# Patient Record
Sex: FEMALE | Race: WHITE | NOT HISPANIC OR LATINO | Employment: PART TIME | URBAN - METROPOLITAN AREA
[De-identification: names, ages, dates, MRNs, and addresses within clinical notes are randomized per-mention and may not be internally consistent; named-entity substitution may affect disease eponyms.]

---
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
## 2019-01-14 ENCOUNTER — TRANSFERRED RECORDS (OUTPATIENT)
Dept: HEALTH INFORMATION MANAGEMENT | Facility: CLINIC | Age: 19
End: 2019-01-14

## 2019-01-15 ENCOUNTER — OFFICE VISIT (OUTPATIENT)
Dept: PEDIATRICS | Facility: OTHER | Age: 19
End: 2019-01-15
Payer: COMMERCIAL

## 2019-01-15 VITALS
SYSTOLIC BLOOD PRESSURE: 100 MMHG | WEIGHT: 121.75 LBS | BODY MASS INDEX: 21.57 KG/M2 | RESPIRATION RATE: 16 BRPM | HEART RATE: 74 BPM | TEMPERATURE: 98.1 F | DIASTOLIC BLOOD PRESSURE: 64 MMHG | HEIGHT: 63 IN

## 2019-01-15 DIAGNOSIS — F41.1 GAD (GENERALIZED ANXIETY DISORDER): ICD-10-CM

## 2019-01-15 DIAGNOSIS — T74.21XA SEXUAL ASSAULT OF ADULT, INITIAL ENCOUNTER: Primary | ICD-10-CM

## 2019-01-15 LAB
ALBUMIN UR-MCNC: 30 MG/DL
APPEARANCE UR: CLEAR
BACTERIA #/AREA URNS HPF: ABNORMAL /HPF
BETA HCG QUAL IFA URINE: NEGATIVE
BILIRUB UR QL STRIP: NEGATIVE
COLOR UR AUTO: YELLOW
GLUCOSE UR STRIP-MCNC: NEGATIVE MG/DL
HGB UR QL STRIP: ABNORMAL
KETONES UR STRIP-MCNC: NEGATIVE MG/DL
LEUKOCYTE ESTERASE UR QL STRIP: NEGATIVE
MUCOUS THREADS #/AREA URNS LPF: PRESENT /LPF
NITRATE UR QL: NEGATIVE
NON-SQ EPI CELLS #/AREA URNS LPF: ABNORMAL /LPF
PH UR STRIP: 5.5 PH (ref 5–7)
RBC #/AREA URNS AUTO: ABNORMAL /HPF
SOURCE: ABNORMAL
SP GR UR STRIP: 1.02 (ref 1–1.03)
SPECIMEN SOURCE: NORMAL
UROBILINOGEN UR STRIP-ACNC: 0.2 EU/DL (ref 0.2–1)
WBC #/AREA URNS AUTO: ABNORMAL /HPF
WET PREP SPEC: NORMAL

## 2019-01-15 PROCEDURE — 86780 TREPONEMA PALLIDUM: CPT | Performed by: NURSE PRACTITIONER

## 2019-01-15 PROCEDURE — 84703 CHORIONIC GONADOTROPIN ASSAY: CPT | Performed by: NURSE PRACTITIONER

## 2019-01-15 PROCEDURE — 36415 COLL VENOUS BLD VENIPUNCTURE: CPT | Performed by: NURSE PRACTITIONER

## 2019-01-15 PROCEDURE — 87389 HIV-1 AG W/HIV-1&-2 AB AG IA: CPT | Performed by: NURSE PRACTITIONER

## 2019-01-15 PROCEDURE — 87591 N.GONORRHOEAE DNA AMP PROB: CPT | Performed by: NURSE PRACTITIONER

## 2019-01-15 PROCEDURE — 81001 URINALYSIS AUTO W/SCOPE: CPT | Performed by: NURSE PRACTITIONER

## 2019-01-15 PROCEDURE — 87210 SMEAR WET MOUNT SALINE/INK: CPT | Performed by: NURSE PRACTITIONER

## 2019-01-15 PROCEDURE — 87491 CHLMYD TRACH DNA AMP PROBE: CPT | Performed by: NURSE PRACTITIONER

## 2019-01-15 PROCEDURE — 99214 OFFICE O/P EST MOD 30 MIN: CPT | Performed by: NURSE PRACTITIONER

## 2019-01-15 RX ORDER — AZITHROMYCIN 500 MG/1
1000 TABLET, FILM COATED ORAL DAILY
Qty: 2 TABLET | Refills: 0 | Status: SHIPPED | OUTPATIENT
Start: 2019-01-15 | End: 2019-02-12

## 2019-01-15 RX ORDER — METRONIDAZOLE 500 MG/1
TABLET ORAL
Qty: 4 TABLET | Refills: 0 | Status: SHIPPED | OUTPATIENT
Start: 2019-01-15 | End: 2019-02-12

## 2019-01-15 ASSESSMENT — MIFFLIN-ST. JEOR: SCORE: 1302.5
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
## 2019-01-15 NOTE — PATIENT INSTRUCTIONS
Labs as ordered. Will call with results.  Medications as ordered.   Recheck with me in one month.

## 2019-01-16 ENCOUNTER — TELEPHONE (OUTPATIENT)
Dept: PEDIATRICS | Facility: OTHER | Age: 19
End: 2019-01-16

## 2019-01-16 LAB
C TRACH DNA SPEC QL NAA+PROBE: NEGATIVE
HIV 1+2 AB+HIV1 P24 AG SERPL QL IA: NONREACTIVE
N GONORRHOEA DNA SPEC QL NAA+PROBE: NEGATIVE
SPECIMEN SOURCE: NORMAL
SPECIMEN SOURCE: NORMAL
T PALLIDUM AB SER QL: NONREACTIVE

## 2019-01-16 NOTE — TELEPHONE ENCOUNTER
Left message for patient to return our phone call. See message below.  Claudette Tijerina CMA    Written by Alessandra Menjivar APRN CNP on 1/16/2019  1:21 PM   Negative  urine, wet prep (yeast and bacterial culture), HIV and syphilis test were all negative. We are just waiting on her gonorrhea and chlamydia testing.

## 2019-01-17 ENCOUNTER — TELEPHONE (OUTPATIENT)
Dept: PEDIATRICS | Facility: OTHER | Age: 19
End: 2019-01-17

## 2019-01-17 NOTE — TELEPHONE ENCOUNTER
Please let patient know that her gonorrhea and chlamydia were both negative.     Alessandra Menjivar, Pediatric Nurse Practitioner   [place]

## 2019-01-17 NOTE — TELEPHONE ENCOUNTER
Left a message for patient to let her know both her tests results came back negative.  Navya Zafar, CMA

## 2019-01-29 ENCOUNTER — MYC MEDICAL ADVICE (OUTPATIENT)
Dept: PEDIATRICS | Facility: OTHER | Age: 19
End: 2019-01-29

## 2019-01-29 NOTE — LETTER
1/29/2019        RE: Cristel Drakechristianoamadou  [address] [facility] [address]        To whom it may concern:     Cristel is under my care and treatment for Generalized Anxiety Disorder.       Sincerely,        ASHELY Vang CNP

## 2019-02-09 ENCOUNTER — MYC MEDICAL ADVICE (OUTPATIENT)
Dept: PEDIATRICS | Facility: OTHER | Age: 19
End: 2019-02-09

## 2019-02-11 NOTE — TELEPHONE ENCOUNTER
Resent letter via email, sent [employer] letting patient know. Asking to let us know when she receives it.  Emani Grider MA

## 2019-02-12 ENCOUNTER — OFFICE VISIT (OUTPATIENT)
Dept: PEDIATRICS | Facility: OTHER | Age: 19
End: 2019-02-12
Payer: COMMERCIAL

## 2019-02-12 VITALS
HEIGHT: 63 IN | RESPIRATION RATE: 16 BRPM | TEMPERATURE: 98.5 F | HEART RATE: 80 BPM | BODY MASS INDEX: 21.97 KG/M2 | WEIGHT: 124 LBS | DIASTOLIC BLOOD PRESSURE: 62 MMHG | SYSTOLIC BLOOD PRESSURE: 110 MMHG

## 2019-02-12 DIAGNOSIS — Z23 NEED FOR VACCINATION: ICD-10-CM

## 2019-02-12 DIAGNOSIS — F41.1 GAD (GENERALIZED ANXIETY DISORDER): Primary | ICD-10-CM

## 2019-02-12 PROCEDURE — 90471 IMMUNIZATION ADMIN: CPT | Performed by: NURSE PRACTITIONER

## 2019-02-12 PROCEDURE — 90472 IMMUNIZATION ADMIN EACH ADD: CPT | Performed by: NURSE PRACTITIONER

## 2019-02-12 PROCEDURE — 99214 OFFICE O/P EST MOD 30 MIN: CPT | Mod: 25 | Performed by: NURSE PRACTITIONER

## 2019-02-12 PROCEDURE — 90632 HEPA VACCINE ADULT IM: CPT | Performed by: NURSE PRACTITIONER

## 2019-02-12 PROCEDURE — 90651 9VHPV VACCINE 2/3 DOSE IM: CPT | Performed by: NURSE PRACTITIONER

## 2019-02-12 RX ORDER — PAROXETINE 10 MG/1
TABLET, FILM COATED ORAL
Qty: 67 TABLET | Refills: 0 | Status: SHIPPED | OUTPATIENT
Start: 2019-02-19 | End: 2019-03-07

## 2019-02-12 ASSESSMENT — PATIENT HEALTH QUESTIONNAIRE - PHQ9
SUM OF ALL RESPONSES TO PHQ QUESTIONS 1-9: 18
5. POOR APPETITE OR OVEREATING: NEARLY EVERY DAY

## 2019-02-12 ASSESSMENT — ANXIETY QUESTIONNAIRES
5. BEING SO RESTLESS THAT IT IS HARD TO SIT STILL: MORE THAN HALF THE DAYS
7. FEELING AFRAID AS IF SOMETHING AWFUL MIGHT HAPPEN: NEARLY EVERY DAY
3. WORRYING TOO MUCH ABOUT DIFFERENT THINGS: NEARLY EVERY DAY
GAD7 TOTAL SCORE: 19
2. NOT BEING ABLE TO STOP OR CONTROL WORRYING: NEARLY EVERY DAY
6. BECOMING EASILY ANNOYED OR IRRITABLE: MORE THAN HALF THE DAYS
IF YOU CHECKED OFF ANY PROBLEMS ON THIS QUESTIONNAIRE, HOW DIFFICULT HAVE THESE PROBLEMS MADE IT FOR YOU TO DO YOUR WORK, TAKE CARE OF THINGS AT HOME, OR GET ALONG WITH OTHER PEOPLE: VERY DIFFICULT
1. FEELING NERVOUS, ANXIOUS, OR ON EDGE: NEARLY EVERY DAY

## 2019-02-12 ASSESSMENT — MIFFLIN-ST. JEOR: SCORE: 1312.71

## 2019-02-12 NOTE — NURSING NOTE
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
## 2019-02-13 ASSESSMENT — ANXIETY QUESTIONNAIRES: GAD7 TOTAL SCORE: 19

## 2019-03-07 ENCOUNTER — OFFICE VISIT (OUTPATIENT)
Dept: PEDIATRICS | Facility: OTHER | Age: 19
End: 2019-03-07
Payer: COMMERCIAL

## 2019-03-07 VITALS
WEIGHT: 122 LBS | BODY MASS INDEX: 21.62 KG/M2 | RESPIRATION RATE: 14 BRPM | HEART RATE: 94 BPM | DIASTOLIC BLOOD PRESSURE: 50 MMHG | SYSTOLIC BLOOD PRESSURE: 102 MMHG | HEIGHT: 63 IN | TEMPERATURE: 98.5 F

## 2019-03-07 DIAGNOSIS — F41.1 GAD (GENERALIZED ANXIETY DISORDER): ICD-10-CM

## 2019-03-07 PROCEDURE — 99213 OFFICE O/P EST LOW 20 MIN: CPT | Performed by: NURSE PRACTITIONER

## 2019-03-07 RX ORDER — PAROXETINE 10 MG/1
20 TABLET, FILM COATED ORAL DAILY
Qty: 180 TABLET | Refills: 0 | Status: SHIPPED | OUTPATIENT
Start: 2019-03-07 | End: 2019-04-04

## 2019-03-07 ASSESSMENT — ANXIETY QUESTIONNAIRES
1. FEELING NERVOUS, ANXIOUS, OR ON EDGE: SEVERAL DAYS
6. BECOMING EASILY ANNOYED OR IRRITABLE: SEVERAL DAYS
IF YOU CHECKED OFF ANY PROBLEMS ON THIS QUESTIONNAIRE, HOW DIFFICULT HAVE THESE PROBLEMS MADE IT FOR YOU TO DO YOUR WORK, TAKE CARE OF THINGS AT HOME, OR GET ALONG WITH OTHER PEOPLE: SOMEWHAT DIFFICULT
GAD7 TOTAL SCORE: 4
5. BEING SO RESTLESS THAT IT IS HARD TO SIT STILL: SEVERAL DAYS
7. FEELING AFRAID AS IF SOMETHING AWFUL MIGHT HAPPEN: SEVERAL DAYS
2. NOT BEING ABLE TO STOP OR CONTROL WORRYING: NOT AT ALL
3. WORRYING TOO MUCH ABOUT DIFFERENT THINGS: NOT AT ALL

## 2019-03-07 ASSESSMENT — MIFFLIN-ST. JEOR: SCORE: 1297.39

## 2019-03-07 ASSESSMENT — PATIENT HEALTH QUESTIONNAIRE - PHQ9
5. POOR APPETITE OR OVEREATING: NOT AT ALL
SUM OF ALL RESPONSES TO PHQ QUESTIONS 1-9: 9

## 2019-03-07 NOTE — PROGRESS NOTES
SUBJECTIVE:                                                    Cristel Grissom is a 18 year old female who presents to clinic today with mother because of:    Chief Complaint   Patient presents with     Recheck Medication     anxiety, insomina     Panel Management     PHQ/SUSU        HPI:    Started Paxil one month ago, started at 10 mg now up 20 mg.   Increased sleeping the first week, now having difficulty sleeping, feels tired but cannot fall asleep. Hours of insomnia. Taking medicine at night.     Not working, not applying job. No exercising.   No nausea, stomach aches.     No suicidal ideation. No self harm.       SUSU-7 SCORE 5/29/2018 2/12/2019 3/7/2019   Total Score 7 (mild anxiety) - -   Total Score 7 19 4       PHQ-9 SCORE 5/29/2018 2/12/2019 3/7/2019   PHQ-9 Total Score MyChart 2 (Minimal depression) - -   PHQ-9 Total Score 2 18 9         ROS:  Constitutional, eye, ENT, skin, respiratory, cardiac, and GI are normal except as otherwise noted.    PROBLEM LIST:  Patient Active Problem List    Diagnosis Date Noted     Acne vulgaris 01/25/2017     Priority: Medium     SUSU (generalized anxiety disorder) 01/25/2017     Priority: Medium      MEDICATIONS:  Current Outpatient Medications   Medication Sig Dispense Refill     norgestimate-ethinyl estradiol (ORTHO-CYCLEN, SPRINTEC) 0.25-35 MG-MCG per tablet Take 1 tablet by mouth daily 84 tablet 3     PARoxetine (PAXIL) 10 MG tablet Take 1 tablet (10 mg) by mouth daily for 7 days, THEN 2 tablets (20 mg) daily. 67 tablet 0     hydrOXYzine (ATARAX) 10 MG/5ML syrup Take 12.5 mLs (25 mg) by mouth 3 times daily May increase to 25 mls (50 mg) (Patient not taking: Reported on 3/7/2019) 473 mL 0      ALLERGIES:  Allergies   Allergen Reactions     Bactrim [Sulfamethoxazole W/Trimethoprim]      When very young, vomiting likely per mom     Cefzil [Cefprozil]      When very young, vomiting likely per mom     Penicillins      When very young, vomiting likely per mom     Sulfa Drugs  "     When very young, vomiting likely per mom       Problem list and histories reviewed & adjusted, as indicated.    OBJECTIVE:                                                      /50   Pulse 94   Temp 98.5  F (36.9  C) (Temporal)   Resp 14   Ht 5' 2.99\" (1.6 m)   Wt 122 lb (55.3 kg)   LMP 02/19/2019   BMI 21.62 kg/m     Blood pressure percentiles are not available for patients who are 18 years or older.     PSYCH: mentation appears normal and affect normal/bright  MENTAL STATUS EXAM:  Appearance/Behavior: Neatly groomed  Speech: Normal  Mood/Affect: normal affect  Insight: Adequate    DIAGNOSTICS: None    ASSESSMENT/PLAN:                                                    1. SUSU (generalized anxiety disorder)  One month follow up for change in medication.   Side effects of difficulty falling asleep.   She is not very active during the day. She is not working, exercising.   Discussed sleep hygiene, staying out of her bed unless sleeping.   Take medicine in the morning instead of evening.       FOLLOW UP: one month    Alessandra Menjivar, Pediatric Nurse Practitioner   [facility]    "

## 2019-03-08 ASSESSMENT — ANXIETY QUESTIONNAIRES: GAD7 TOTAL SCORE: 4

## 2019-03-08 NOTE — PATIENT INSTRUCTIONS
Take medication in the morning, usually with breakfast.   Practice improved sleep hygiene at night.   Try the hydroxyzine in the evening.

## 2019-04-04 ENCOUNTER — OFFICE VISIT (OUTPATIENT)
Dept: PEDIATRICS | Facility: OTHER | Age: 19
End: 2019-04-04
Payer: COMMERCIAL

## 2019-04-04 VITALS
BODY MASS INDEX: 21.93 KG/M2 | WEIGHT: 123.75 LBS | HEIGHT: 63 IN | TEMPERATURE: 98.1 F | HEART RATE: 115 BPM | OXYGEN SATURATION: 99 % | RESPIRATION RATE: 18 BRPM

## 2019-04-04 DIAGNOSIS — F41.1 GAD (GENERALIZED ANXIETY DISORDER): Primary | ICD-10-CM

## 2019-04-04 PROCEDURE — 99213 OFFICE O/P EST LOW 20 MIN: CPT | Performed by: NURSE PRACTITIONER

## 2019-04-04 RX ORDER — PAROXETINE 10 MG/1
30 TABLET, FILM COATED ORAL EVERY MORNING
Qty: 45 TABLET | Refills: 0 | Status: SHIPPED | OUTPATIENT
Start: 2019-04-04 | End: 2019-07-21

## 2019-04-04 ASSESSMENT — ANXIETY QUESTIONNAIRES
GAD7 TOTAL SCORE: 4
7. FEELING AFRAID AS IF SOMETHING AWFUL MIGHT HAPPEN: NOT AT ALL
6. BECOMING EASILY ANNOYED OR IRRITABLE: SEVERAL DAYS
GAD7 TOTAL SCORE: 4
3. WORRYING TOO MUCH ABOUT DIFFERENT THINGS: SEVERAL DAYS
4. TROUBLE RELAXING: NOT AT ALL
5. BEING SO RESTLESS THAT IT IS HARD TO SIT STILL: NOT AT ALL
1. FEELING NERVOUS, ANXIOUS, OR ON EDGE: SEVERAL DAYS
7. FEELING AFRAID AS IF SOMETHING AWFUL MIGHT HAPPEN: NOT AT ALL
GAD7 TOTAL SCORE: 4
2. NOT BEING ABLE TO STOP OR CONTROL WORRYING: SEVERAL DAYS

## 2019-04-04 ASSESSMENT — MIFFLIN-ST. JEOR: SCORE: 1305.95

## 2019-04-04 ASSESSMENT — PATIENT HEALTH QUESTIONNAIRE - PHQ9
SUM OF ALL RESPONSES TO PHQ QUESTIONS 1-9: 8
SUM OF ALL RESPONSES TO PHQ QUESTIONS 1-9: 8
10. IF YOU CHECKED OFF ANY PROBLEMS, HOW DIFFICULT HAVE THESE PROBLEMS MADE IT FOR YOU TO DO YOUR WORK, TAKE CARE OF THINGS AT HOME, OR GET ALONG WITH OTHER PEOPLE: VERY DIFFICULT
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
## 2019-04-05 ASSESSMENT — ANXIETY QUESTIONNAIRES: GAD7 TOTAL SCORE: 4

## 2019-04-05 ASSESSMENT — PATIENT HEALTH QUESTIONNAIRE - PHQ9: SUM OF ALL RESPONSES TO PHQ QUESTIONS 1-9: 8

## 2019-07-15 ENCOUNTER — OFFICE VISIT (OUTPATIENT)
Dept: FAMILY MEDICINE | Facility: OTHER | Age: 19
End: 2019-07-15

## 2019-07-15 VITALS
BODY MASS INDEX: 23.42 KG/M2 | WEIGHT: 132.2 LBS | RESPIRATION RATE: 12 BRPM | TEMPERATURE: 97.6 F | DIASTOLIC BLOOD PRESSURE: 70 MMHG | HEART RATE: 66 BPM | SYSTOLIC BLOOD PRESSURE: 102 MMHG | HEIGHT: 63 IN

## 2019-07-15 DIAGNOSIS — S06.0X0A CONCUSSION WITHOUT LOSS OF CONSCIOUSNESS, INITIAL ENCOUNTER: Primary | ICD-10-CM

## 2019-07-15 PROCEDURE — 99213 OFFICE O/P EST LOW 20 MIN: CPT | Performed by: STUDENT IN AN ORGANIZED HEALTH CARE EDUCATION/TRAINING PROGRAM

## 2019-07-15 ASSESSMENT — PAIN SCALES - GENERAL: PAINLEVEL: MODERATE PAIN (5)

## 2019-07-15 ASSESSMENT — MIFFLIN-ST. JEOR: SCORE: 1339.28
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
## 2019-07-15 NOTE — LETTER
[facility]  [address] [address]  Phone: [phone]    July 15, 2019        Cristel Grissom  [address] [facility] [address]          To whom it may concern:    RE: Cristel Grissom    Patient was seen and treated today at our clinic today for concussion. I am okay with her returning to work today. I recommend she does not work Thursdays for the next 2 weeks since they are field trip days and require more cognitive effort.  If her headache or other symptoms of concussion worsen, she may need to either work part-time for a period of time or stop working for a period of time.     Please contact me for questions or concerns.      Sincerely,            ASHELY Collins CNP

## 2019-07-21 ENCOUNTER — MYC REFILL (OUTPATIENT)
Dept: PEDIATRICS | Facility: OTHER | Age: 19
End: 2019-07-21

## 2019-07-21 DIAGNOSIS — F41.1 GAD (GENERALIZED ANXIETY DISORDER): ICD-10-CM

## 2019-07-23 ENCOUNTER — MYC REFILL (OUTPATIENT)
Dept: PEDIATRICS | Facility: OTHER | Age: 19
End: 2019-07-23

## 2019-07-23 DIAGNOSIS — F41.1 GAD (GENERALIZED ANXIETY DISORDER): ICD-10-CM

## 2019-07-23 RX ORDER — PAROXETINE 10 MG/1
30 TABLET, FILM COATED ORAL EVERY MORNING
Qty: 45 TABLET | Refills: 0 | Status: CANCELLED | OUTPATIENT
Start: 2019-07-23

## 2019-07-23 RX ORDER — PAROXETINE 10 MG/1
30 TABLET, FILM COATED ORAL EVERY MORNING
Qty: 90 TABLET | Refills: 0 | Status: SHIPPED | OUTPATIENT
Start: 2019-07-23 | End: 2019-07-24

## 2019-07-23 NOTE — TELEPHONE ENCOUNTER
Will approve 1 month.  Cristel is due for med check before any additional refills.  Please schedule.  Electronically signed by Evelyn Workman M.D.

## 2019-07-23 NOTE — TELEPHONE ENCOUNTER
Left message that refill was approved for 1 month and to call and schedule prior to running out for med check appt.

## 2019-07-24 ENCOUNTER — OFFICE VISIT (OUTPATIENT)
Dept: FAMILY MEDICINE | Facility: OTHER | Age: 19
End: 2019-07-24
Payer: COMMERCIAL

## 2019-07-24 VITALS
RESPIRATION RATE: 18 BRPM | DIASTOLIC BLOOD PRESSURE: 60 MMHG | SYSTOLIC BLOOD PRESSURE: 106 MMHG | HEART RATE: 80 BPM | BODY MASS INDEX: 24.31 KG/M2 | TEMPERATURE: 97.4 F | WEIGHT: 137.2 LBS | HEIGHT: 63 IN

## 2019-07-24 DIAGNOSIS — F41.1 GAD (GENERALIZED ANXIETY DISORDER): ICD-10-CM

## 2019-07-24 PROCEDURE — 99213 OFFICE O/P EST LOW 20 MIN: CPT | Performed by: STUDENT IN AN ORGANIZED HEALTH CARE EDUCATION/TRAINING PROGRAM

## 2019-07-24 RX ORDER — PAROXETINE 10 MG/1
30 TABLET, FILM COATED ORAL EVERY MORNING
Qty: 90 TABLET | Refills: 1 | Status: SHIPPED | OUTPATIENT
Start: 2019-07-24 | End: 2019-08-21

## 2019-07-24 RX ORDER — HYDROXYZINE HCL 10 MG/5 ML
25 SOLUTION, ORAL ORAL 3 TIMES DAILY
Qty: 473 ML | Refills: 0 | Status: SHIPPED | OUTPATIENT
Start: 2019-07-24 | End: 2020-04-17

## 2019-07-24 ASSESSMENT — ANXIETY QUESTIONNAIRES
4. TROUBLE RELAXING: NOT AT ALL
2. NOT BEING ABLE TO STOP OR CONTROL WORRYING: SEVERAL DAYS
GAD7 TOTAL SCORE: 4
1. FEELING NERVOUS, ANXIOUS, OR ON EDGE: SEVERAL DAYS
GAD7 TOTAL SCORE: 4
6. BECOMING EASILY ANNOYED OR IRRITABLE: NOT AT ALL
7. FEELING AFRAID AS IF SOMETHING AWFUL MIGHT HAPPEN: SEVERAL DAYS
3. WORRYING TOO MUCH ABOUT DIFFERENT THINGS: SEVERAL DAYS
GAD7 TOTAL SCORE: 4
7. FEELING AFRAID AS IF SOMETHING AWFUL MIGHT HAPPEN: SEVERAL DAYS
5. BEING SO RESTLESS THAT IT IS HARD TO SIT STILL: NOT AT ALL

## 2019-07-24 ASSESSMENT — PATIENT HEALTH QUESTIONNAIRE - PHQ9
SUM OF ALL RESPONSES TO PHQ QUESTIONS 1-9: 4
SUM OF ALL RESPONSES TO PHQ QUESTIONS 1-9: 4

## 2019-07-24 ASSESSMENT — MIFFLIN-ST. JEOR: SCORE: 1361.96

## 2019-07-24 NOTE — PROGRESS NOTES
Subjective     Cristel Grissom is a 19 year old female who presents to clinic today for the following health issues:    History of Present Illness        Mental Health Follow-up:  Patient presents to follow-up on Anxiety.    Patient's anxiety since last visit has been:  Good  The patient is not having other symptoms associated with anxiety.  Any significant life events: relationship concerns  Patient is feeling anxious or having panic attacks.  Patient has no concerns about alcohol or drug use.     Social History  Tobacco Use    Smoking status: Never Smoker    Smokeless tobacco: Never Used  Alcohol use: No  Drug use: No      Today's PHQ-9         PHQ-9 Total Score:     (P) 4   PHQ-9 Q9 Thoughts of better off dead/self-harm past 2 weeks :   (P) Not at all   Thoughts of suicide or self harm:      Self-harm Plan:        Self-harm Action:          Safety concerns for self or others:           She eats 2-3 servings of fruits and vegetables daily.She consumes 4 sweetened beverage(s) daily.  She is taking medications regularly.    Answers for HPI/ROS submitted by the patient on 7/24/2019   Chronic problems general questions HPI Form  PHQ9 TOTAL SCORE: 4  SUSU 7 TOTAL SCORE: 4    Patient Active Problem List   Diagnosis     Acne vulgaris     SUSU (generalized anxiety disorder)     History reviewed. No pertinent surgical history.    Social History     Tobacco Use     Smoking status: Never Smoker     Smokeless tobacco: Never Used   Substance Use Topics     Alcohol use: No     History reviewed. No pertinent family history.      Current Outpatient Medications   Medication Sig Dispense Refill     hydrOXYzine (ATARAX) 10 MG/5ML syrup Take 12.5 mLs (25 mg) by mouth 3 times daily May increase to 25 mls (50 mg) 473 mL 0     PARoxetine (PAXIL) 10 MG tablet Take 3 tablets (30 mg) by mouth every morning 90 tablet 1     norgestimate-ethinyl estradiol (ORTHO-CYCLEN, SPRINTEC) 0.25-35 MG-MCG per tablet Take 1 tablet by mouth daily (Patient  "not taking: Reported on 7/15/2019) 84 tablet 3     Allergies   Allergen Reactions     Bactrim [Sulfamethoxazole W/Trimethoprim]      When very young, vomiting likely per mom     Cefzil [Cefprozil]      When very young, vomiting likely per mom     Penicillins      When very young, vomiting likely per mom     Sulfa Drugs      When very young, vomiting likely per mom     BP Readings from Last 3 Encounters:   07/24/19 106/60   07/15/19 102/70   03/07/19 102/50    Wt Readings from Last 3 Encounters:   07/24/19 62.2 kg (137 lb 3.2 oz)   07/15/19 60 kg (132 lb 3.2 oz)   04/04/19 56.1 kg (123 lb 12 oz) (41 %)*     * Growth percentiles are based on CDC (Girls, 2-20 Years) data.                    Reviewed and updated as needed this visit by Provider         Review of Systems   ROS COMP: Constitutional, HEENT, cardiovascular, pulmonary, gi and gu systems are negative, except as otherwise noted.      Objective    /60   Pulse 80   Temp 97.4  F (36.3  C) (Temporal)   Resp 18   Ht 1.601 m (5' 3.03\")   Wt 62.2 kg (137 lb 3.2 oz)   BMI 24.28 kg/m    Body mass index is 24.28 kg/m .  Physical Exam   GENERAL: healthy, alert and no distress  RESP: lungs clear to auscultation - no rales, rhonchi or wheezes  CV: regular rate and rhythm, normal S1 S2, no S3 or S4, no murmur, click or rub, no peripheral edema and peripheral pulses strong  PSYCH: mentation appears normal, mildly anxious, judgement and insight intact and appearance well groomed    Diagnostic Test Results:  Labs reviewed in Epic        Assessment & Plan     1. SUSU (generalized anxiety disorder)  Improved.  Continue current medication.  Follow-up in 6 months or sooner if needed.  - hydrOXYzine (ATARAX) 10 MG/5ML syrup; Take 12.5 mLs (25 mg) by mouth 3 times daily May increase to 25 mls (50 mg)  Dispense: 473 mL; Refill: 0  - PARoxetine (PAXIL) 10 MG tablet; Take 3 tablets (30 mg) by mouth every morning  Dispense: 90 tablet; Refill: 1     No follow-ups on " file.    ASHELY Collins [facility]

## 2019-07-25 ASSESSMENT — PATIENT HEALTH QUESTIONNAIRE - PHQ9: SUM OF ALL RESPONSES TO PHQ QUESTIONS 1-9: 4

## 2019-07-25 ASSESSMENT — ANXIETY QUESTIONNAIRES: GAD7 TOTAL SCORE: 4

## 2019-08-09 ENCOUNTER — MYC MEDICAL ADVICE (OUTPATIENT)
Dept: FAMILY MEDICINE | Facility: OTHER | Age: 19
End: 2019-08-09

## 2019-08-09 ENCOUNTER — OFFICE VISIT (OUTPATIENT)
Dept: FAMILY MEDICINE | Facility: OTHER | Age: 19
End: 2019-08-09
Payer: COMMERCIAL

## 2019-08-09 VITALS
TEMPERATURE: 97 F | BODY MASS INDEX: 24.36 KG/M2 | OXYGEN SATURATION: 100 % | DIASTOLIC BLOOD PRESSURE: 70 MMHG | SYSTOLIC BLOOD PRESSURE: 112 MMHG | RESPIRATION RATE: 16 BRPM | HEART RATE: 100 BPM | WEIGHT: 137.5 LBS | HEIGHT: 63 IN

## 2019-08-09 DIAGNOSIS — S05.91XA RIGHT EYE INJURY, INITIAL ENCOUNTER: Primary | ICD-10-CM

## 2019-08-09 PROCEDURE — 99213 OFFICE O/P EST LOW 20 MIN: CPT | Performed by: FAMILY MEDICINE

## 2019-08-09 ASSESSMENT — MIFFLIN-ST. JEOR: SCORE: 1363.3

## 2019-08-09 ASSESSMENT — PAIN SCALES - GENERAL: PAINLEVEL: NO PAIN (0)

## 2019-08-09 NOTE — LETTER
[address]   [facility] [address]  Phone: [phone]    August 12, 2019        Cristel Grissom  [address] [facility] [address]          To whom it may concern:    RE: Cristel Grissom    Patient was seen and treated at our clinic on 8/9/19 and can return to work with out any restrictions.    Please contact me for questions or concerns.      Sincerely,        Arlene Cummings MD

## 2019-08-09 NOTE — PROGRESS NOTES
Subjective     Cristel Grissom is a 19 year old female who presents to clinic today for the following health issues:    HPI     Eye(s) Problem  Onset: x 1 day    Description:   Location: right  Pain: YES- when touched  Redness: no    Accompanying Signs & Symptoms:  Discharge/mattering: no  Swelling: YES- Eye Lid  Visual changes: YES- Double Vision yesterday  Fever: no  Nasal Congestion: no  Bothered by bright lights: no    History:   Trauma: YES- hit with a gold ball  Foreign body exposure: no    Precipitating factors:   Wearing contacts: YES    Alleviating factors:  Improved by: NA  Therapies Tried and outcome: Ice      -------------------------------------    Patient Active Problem List   Diagnosis     Acne vulgaris     SUSU (generalized anxiety disorder)     Right eye injury, initial encounter     History reviewed. No pertinent surgical history.    Social History     Tobacco Use     Smoking status: Never Smoker     Smokeless tobacco: Never Used   Substance Use Topics     Alcohol use: No     History reviewed. No pertinent family history.      Current Outpatient Medications   Medication Sig Dispense Refill     hydrOXYzine (ATARAX) 10 MG/5ML syrup Take 12.5 mLs (25 mg) by mouth 3 times daily May increase to 25 mls (50 mg) 473 mL 0     PARoxetine (PAXIL) 10 MG tablet Take 3 tablets (30 mg) by mouth every morning 90 tablet 1     norgestimate-ethinyl estradiol (ORTHO-CYCLEN, SPRINTEC) 0.25-35 MG-MCG per tablet Take 1 tablet by mouth daily (Patient not taking: Reported on 7/15/2019) 84 tablet 3     Allergies   Allergen Reactions     Bactrim [Sulfamethoxazole W/Trimethoprim]      When very young, vomiting likely per mom     Cefzil [Cefprozil]      When very young, vomiting likely per mom     Penicillins      When very young, vomiting likely per mom     Sulfa Drugs      When very young, vomiting likely per mom     BP Readings from Last 3 Encounters:   08/09/19 112/70   07/24/19 106/60   07/15/19 102/70    Wt Readings from  "Last 3 Encounters:   08/09/19 62.4 kg (137 lb 8 oz)   07/24/19 62.2 kg (137 lb 3.2 oz)   07/15/19 60 kg (132 lb 3.2 oz)                    Reviewed and updated as needed this visit by Provider  Tobacco  Allergies  Meds  Problems  Med Hx  Surg Hx  Fam Hx         Review of Systems   ROS COMP: Constitutional, HEENT, cardiovascular, pulmonary, gi and gu systems are negative, except as otherwise noted.      Objective    /70 (BP Location: Left arm, Patient Position: Chair, Cuff Size: Adult Regular)   Pulse 100   Temp 97  F (36.1  C) (Temporal)   Resp 16   Ht 1.601 m (5' 3.03\")   Wt 62.4 kg (137 lb 8 oz)   SpO2 100%   BMI 24.33 kg/m    Body mass index is 24.33 kg/m .  Physical Exam   Constitutional: She appears well-developed and well-nourished.   HENT:   Head: Normocephalic.   Eyes: Pupils are equal, round, and reactive to light. Conjunctivae and EOM are normal. Right eye exhibits no discharge. Left eye exhibits no discharge. No scleral icterus.          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 Normal extraocular muscle movements, normal pupillary reflexes. Normal conjunctiva. Has a mild swelling along the uperior aspect of the orbital bone. No open wounds. Advised nsaids and ice . Reassured  Discussed home care  Reportable signs and symptoms discussed  RTC if symptoms persist or fail to improve                      See Patient Instructions  No follow-ups on file.    Arlene Cummings MD  [facility]    "

## 2019-08-09 NOTE — ASSESSMENT & PLAN NOTE
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 Normal extraocular muscle movements, normal pupillary reflexes. Normal conjunctiva. Has a mild swelling along the uperior aspect of the orbital bone. No open wounds. Advised nsaids and ice . Reassured  Discussed home care  Reportable signs and symptoms discussed  RTC if symptoms persist or fail to improve

## 2019-08-19 NOTE — PROGRESS NOTES
Subjective     Cristel Grissom is a 19 year old female who presents to clinic today for the following health issues:    Anxiety   This is a chronic problem. The problem occurs intermittently. The problem has been gradually improving. The treatment provided significant relief.      She is doing well with Paxil at its current dose.  She is using hydroxyzine rarely for panic symptoms.  She is going off to college soon and needs her refills sent to [facility]. She would also like to discuss pulling her hair.  She picks at her hair and pulls it out and to use on it and will ingest it.  She has been doing this for many years and it is a nervous habit.  She is talked to counselors about it in the past and they have recommended replacing the habit with something different such as washing her hands.  The habit will dissipate for periods of time but then worsen at times.      Patient Active Problem List   Diagnosis     Acne vulgaris     SUSU (generalized anxiety disorder)     Right eye injury, initial encounter     History reviewed. No pertinent surgical history.    Social History     Tobacco Use     Smoking status: Never Smoker     Smokeless tobacco: Never Used   Substance Use Topics     Alcohol use: No     History reviewed. No pertinent family history.      Current Outpatient Medications   Medication Sig Dispense Refill     hydrOXYzine (ATARAX) 10 MG/5ML syrup Take 12.5 mLs (25 mg) by mouth 3 times daily May increase to 25 mls (50 mg) 473 mL 0     PARoxetine (PAXIL) 10 MG tablet Take 3 tablets (30 mg) by mouth every morning 270 tablet 3     norgestimate-ethinyl estradiol (ORTHO-CYCLEN, SPRINTEC) 0.25-35 MG-MCG per tablet Take 1 tablet by mouth daily (Patient not taking: Reported on 8/20/2019) 84 tablet 3     Allergies   Allergen Reactions     Bactrim [Sulfamethoxazole W/Trimethoprim]      When very young, vomiting likely per mom     Cefzil [Cefprozil]      When very young, vomiting likely per mom     Penicillins       When very young, vomiting likely per mom     Sulfa Drugs      When very young, vomiting likely per mom     BP Readings from Last 3 Encounters:   08/20/19 92/60   08/09/19 112/70   07/24/19 106/60    Wt Readings from Last 3 Encounters:   08/20/19 63 kg (139 lb)   08/09/19 62.4 kg (137 lb 8 oz)   07/24/19 62.2 kg (137 lb 3.2 oz)                    Reviewed and updated as needed this visit by Provider         Review of Systems   Psychiatric/Behavioral: The patient is nervous/anxious.       ROS COMP: Constitutional, HEENT, cardiovascular, pulmonary, gi and gu systems are negative, except as otherwise noted.      Objective    BP 92/60   Pulse 84   Temp 98.2  F (36.8  C) (Temporal)   Resp 16   Wt 63 kg (139 lb)   LMP 08/08/2019 (Exact Date)   BMI 24.60 kg/m    Body mass index is 24.6 kg/m .  Physical Exam   GENERAL: healthy, alert and no distress  RESP: lungs clear to auscultation - no rales, rhonchi or wheezes  CV: regular rate and rhythm, normal S1 S2, no S3 or S4, no murmur, click or rub  PSYCH: mentation appears normal, anxious, judgement and insight intact and appearance well groomed    Diagnostic Test Results:  Labs reviewed in Epic        Assessment & Plan     1. SUSU (generalized anxiety disorder)  2. Hair plucking  Stable.  Has had an increase in picking at hair.  We discussed trying to find a replacement habit that is something that she can easily do wherever she is there is using some sort of fidget device that she keeps in her pocket or carry something she can doodle on. Continue current medication(s) and dose(s).   - PARoxetine (PAXIL) 10 MG tablet; Take 3 tablets (30 mg) by mouth every morning  Dispense: 270 tablet; Refill: 3      No follow-ups on file.    ASHELY Collins [facility]

## 2019-08-20 ENCOUNTER — OFFICE VISIT (OUTPATIENT)
Dept: FAMILY MEDICINE | Facility: OTHER | Age: 19
End: 2019-08-20
Payer: COMMERCIAL

## 2019-08-20 VITALS
RESPIRATION RATE: 16 BRPM | HEART RATE: 84 BPM | DIASTOLIC BLOOD PRESSURE: 60 MMHG | BODY MASS INDEX: 24.6 KG/M2 | TEMPERATURE: 98.2 F | SYSTOLIC BLOOD PRESSURE: 92 MMHG | WEIGHT: 139 LBS

## 2019-08-20 DIAGNOSIS — F63.3: ICD-10-CM

## 2019-08-20 DIAGNOSIS — F41.1 GAD (GENERALIZED ANXIETY DISORDER): Primary | ICD-10-CM

## 2019-08-20 PROCEDURE — 99213 OFFICE O/P EST LOW 20 MIN: CPT | Performed by: STUDENT IN AN ORGANIZED HEALTH CARE EDUCATION/TRAINING PROGRAM

## 2019-08-20 ASSESSMENT — ANXIETY QUESTIONNAIRES
2. NOT BEING ABLE TO STOP OR CONTROL WORRYING: SEVERAL DAYS
7. FEELING AFRAID AS IF SOMETHING AWFUL MIGHT HAPPEN: SEVERAL DAYS
3. WORRYING TOO MUCH ABOUT DIFFERENT THINGS: SEVERAL DAYS
GAD7 TOTAL SCORE: 5
6. BECOMING EASILY ANNOYED OR IRRITABLE: SEVERAL DAYS
IF YOU CHECKED OFF ANY PROBLEMS ON THIS QUESTIONNAIRE, HOW DIFFICULT HAVE THESE PROBLEMS MADE IT FOR YOU TO DO YOUR WORK, TAKE CARE OF THINGS AT HOME, OR GET ALONG WITH OTHER PEOPLE: NOT DIFFICULT AT ALL
5. BEING SO RESTLESS THAT IT IS HARD TO SIT STILL: NOT AT ALL
1. FEELING NERVOUS, ANXIOUS, OR ON EDGE: SEVERAL DAYS

## 2019-08-20 ASSESSMENT — ENCOUNTER SYMPTOMS: NERVOUS/ANXIOUS: 1

## 2019-08-20 ASSESSMENT — PAIN SCALES - GENERAL: PAINLEVEL: NO PAIN (0)

## 2019-08-20 ASSESSMENT — PATIENT HEALTH QUESTIONNAIRE - PHQ9
SUM OF ALL RESPONSES TO PHQ QUESTIONS 1-9: 2
5. POOR APPETITE OR OVEREATING: NOT AT ALL

## 2019-08-21 RX ORDER — PAROXETINE 10 MG/1
30 TABLET, FILM COATED ORAL EVERY MORNING
Qty: 270 TABLET | Refills: 3 | Status: SHIPPED | OUTPATIENT
Start: 2019-08-21 | End: 2020-04-17

## 2019-08-21 ASSESSMENT — ANXIETY QUESTIONNAIRES: GAD7 TOTAL SCORE: 5

## 2019-08-29 ENCOUNTER — TELEPHONE (OUTPATIENT)
Dept: FAMILY MEDICINE | Facility: OTHER | Age: 19
End: 2019-08-29

## 2019-08-29 NOTE — TELEPHONE ENCOUNTER
Reason for Call:  Form, our goal is to have forms completed with 72 hours, however, some forms may require a visit or additional information.    Type of letter, form or note:  worker's compensation    Who is the form from?: Insurance comp    Where did the form come from: form was faxed in    What clinic location was the form placed at?: [facility] - [phone]    Where the form was placed: Given to MA/RN    What number is listed as a contact on the form?: [phone]       Additional comments: none    Call taken on 8/29/2019 at 6:22 PM by Shania Gray

## 2019-10-09 ENCOUNTER — TELEPHONE (OUTPATIENT)
Dept: FAMILY MEDICINE | Facility: OTHER | Age: 19
End: 2019-10-09

## 2019-10-09 NOTE — TELEPHONE ENCOUNTER
Reason for Call:  Form, our goal is to have forms completed with 72 hours, however, some forms may require a visit or additional information.    Type of letter, form or note:  worker's compensation    Who is the form from?: [employer] (if other please explain)    Where did the form come from: form was mailed in    What clinic location was the form placed at?: [facility] - [phone]    Where the form was placed: box Box/Folder    What number is listed as a contact on the form?: [phone]       Additional comments: n/a    Call taken on 10/9/2019 at 1:21 PM by Fabienne Conde

## 2020-03-10 ENCOUNTER — HEALTH MAINTENANCE LETTER (OUTPATIENT)
Age: 20
End: 2020-03-10

## 2020-03-15 ENCOUNTER — MYC MEDICAL ADVICE (OUTPATIENT)
Dept: PEDIATRICS | Facility: OTHER | Age: 20
End: 2020-03-15

## 2020-03-16 NOTE — TELEPHONE ENCOUNTER
Patient would like to schedule an appt for a med check. Left message to call clinic back. When patient calls back please schedule a telephone visit for a med check.     Zheng Avilez MA on 3/16/2020 at 8:16 AM

## 2020-03-17 NOTE — TELEPHONE ENCOUNTER
Attempted to reach the patient with the following information.  Left message for patient to return call to clinic.     Rosa Taylor MA

## 2020-03-18 NOTE — TELEPHONE ENCOUNTER
Patient's mother informed, she will chat with patient when she gets home and submit an evisit with Pamella Robles.  Emani Grider MA

## 2020-04-16 ENCOUNTER — E-VISIT (OUTPATIENT)
Dept: FAMILY MEDICINE | Facility: OTHER | Age: 20
End: 2020-04-16
Payer: COMMERCIAL

## 2020-04-16 DIAGNOSIS — F41.1 GAD (GENERALIZED ANXIETY DISORDER): ICD-10-CM

## 2020-04-16 PROCEDURE — 99421 OL DIG E/M SVC 5-10 MIN: CPT | Performed by: STUDENT IN AN ORGANIZED HEALTH CARE EDUCATION/TRAINING PROGRAM

## 2020-04-16 ASSESSMENT — ANXIETY QUESTIONNAIRES
3. WORRYING TOO MUCH ABOUT DIFFERENT THINGS: SEVERAL DAYS
5. BEING SO RESTLESS THAT IT IS HARD TO SIT STILL: MORE THAN HALF THE DAYS
1. FEELING NERVOUS, ANXIOUS, OR ON EDGE: MORE THAN HALF THE DAYS
GAD7 TOTAL SCORE: 12
2. NOT BEING ABLE TO STOP OR CONTROL WORRYING: MORE THAN HALF THE DAYS
4. TROUBLE RELAXING: MORE THAN HALF THE DAYS
6. BECOMING EASILY ANNOYED OR IRRITABLE: MORE THAN HALF THE DAYS
7. FEELING AFRAID AS IF SOMETHING AWFUL MIGHT HAPPEN: SEVERAL DAYS
GAD7 TOTAL SCORE: 12
7. FEELING AFRAID AS IF SOMETHING AWFUL MIGHT HAPPEN: SEVERAL DAYS

## 2020-04-17 RX ORDER — PAROXETINE 10 MG/1
30 TABLET, FILM COATED ORAL EVERY MORNING
Qty: 270 TABLET | Refills: 3 | Status: SHIPPED | OUTPATIENT
Start: 2020-04-17 | End: 2020-07-15

## 2020-04-17 RX ORDER — HYDROXYZINE HCL 10 MG/5 ML
25 SOLUTION, ORAL ORAL 3 TIMES DAILY PRN
Qty: 473 ML | Refills: 0 | Status: SHIPPED | OUTPATIENT
Start: 2020-04-17 | End: 2020-08-24

## 2020-04-17 ASSESSMENT — ANXIETY QUESTIONNAIRES: GAD7 TOTAL SCORE: 12

## 2020-04-19 ENCOUNTER — MYC MEDICAL ADVICE (OUTPATIENT)
Dept: FAMILY MEDICINE | Facility: OTHER | Age: 20
End: 2020-04-19

## 2020-07-10 ENCOUNTER — TELEPHONE (OUTPATIENT)
Dept: FAMILY MEDICINE | Facility: OTHER | Age: 20
End: 2020-07-10

## 2020-07-10 NOTE — TELEPHONE ENCOUNTER
Reason for call:  Patient reporting a symptom    Symptom or request: light headed dizzy nausea    Duration (how long have symptoms been present): per discoapi    Have you been treated for this before? No    Additional comments: please call to triage. Scheduled with EVGENY today. Per discoapi    Phone Number patient can be reached at:  Home number on file [phone] (home)    Best Time:  any    Can we leave a detailed message on this number:  YES    Call taken on 7/10/2020 at 8:54 AM by Aline Bloom

## 2020-07-13 NOTE — TELEPHONE ENCOUNTER
"Appt was canceled by pt using her mychart with the comment \"feeling better\"    Sunitha Perez, MSN, RN    "

## 2020-07-15 ENCOUNTER — OFFICE VISIT (OUTPATIENT)
Dept: FAMILY MEDICINE | Facility: OTHER | Age: 20
End: 2020-07-15
Payer: COMMERCIAL

## 2020-07-15 VITALS
WEIGHT: 128 LBS | DIASTOLIC BLOOD PRESSURE: 76 MMHG | BODY MASS INDEX: 23.55 KG/M2 | SYSTOLIC BLOOD PRESSURE: 122 MMHG | TEMPERATURE: 97.7 F | HEIGHT: 62 IN | RESPIRATION RATE: 16 BRPM | HEART RATE: 96 BPM

## 2020-07-15 DIAGNOSIS — Z78.9 CONSUMES A VEGAN DIET: ICD-10-CM

## 2020-07-15 DIAGNOSIS — F41.1 GAD (GENERALIZED ANXIETY DISORDER): ICD-10-CM

## 2020-07-15 DIAGNOSIS — R53.81 MALAISE AND FATIGUE: Primary | ICD-10-CM

## 2020-07-15 DIAGNOSIS — K30 STOMACH UPSET: ICD-10-CM

## 2020-07-15 DIAGNOSIS — R07.89 CHEST DISCOMFORT: ICD-10-CM

## 2020-07-15 DIAGNOSIS — R53.83 MALAISE AND FATIGUE: Primary | ICD-10-CM

## 2020-07-15 DIAGNOSIS — R42 DIZZINESS: ICD-10-CM

## 2020-07-15 LAB
ALBUMIN SERPL-MCNC: 4.5 G/DL (ref 3.4–5)
ALP SERPL-CCNC: 72 U/L (ref 40–150)
ALT SERPL W P-5'-P-CCNC: 13 U/L (ref 0–50)
ANION GAP SERPL CALCULATED.3IONS-SCNC: 7 MMOL/L (ref 3–14)
AST SERPL W P-5'-P-CCNC: 8 U/L (ref 0–45)
BASOPHILS # BLD AUTO: 0 10E9/L (ref 0–0.2)
BASOPHILS NFR BLD AUTO: 0.3 %
BILIRUB SERPL-MCNC: 0.4 MG/DL (ref 0.2–1.3)
BUN SERPL-MCNC: 10 MG/DL (ref 7–30)
CALCIUM SERPL-MCNC: 9.4 MG/DL (ref 8.5–10.1)
CHLORIDE SERPL-SCNC: 105 MMOL/L (ref 94–109)
CO2 SERPL-SCNC: 26 MMOL/L (ref 20–32)
CREAT SERPL-MCNC: 0.73 MG/DL (ref 0.52–1.04)
DIFFERENTIAL METHOD BLD: NORMAL
EOSINOPHIL # BLD AUTO: 0.2 10E9/L (ref 0–0.7)
EOSINOPHIL NFR BLD AUTO: 2.6 %
ERYTHROCYTE [DISTWIDTH] IN BLOOD BY AUTOMATED COUNT: 11.4 % (ref 10–15)
GFR SERPL CREATININE-BSD FRML MDRD: >90 ML/MIN/{1.73_M2}
GLUCOSE BLD-MCNC: 86 MG/DL (ref 70–99)
GLUCOSE SERPL-MCNC: 94 MG/DL (ref 70–99)
HCT VFR BLD AUTO: 41.8 % (ref 35–47)
HGB BLD-MCNC: 14.6 G/DL (ref 11.7–15.7)
IRON SATN MFR SERPL: 26 % (ref 15–46)
IRON SERPL-MCNC: 91 UG/DL (ref 35–180)
LYMPHOCYTES # BLD AUTO: 2.2 10E9/L (ref 0.8–5.3)
LYMPHOCYTES NFR BLD AUTO: 30.6 %
MCH RBC QN AUTO: 30.5 PG (ref 26.5–33)
MCHC RBC AUTO-ENTMCNC: 34.9 G/DL (ref 31.5–36.5)
MCV RBC AUTO: 87 FL (ref 78–100)
MONOCYTES # BLD AUTO: 0.6 10E9/L (ref 0–1.3)
MONOCYTES NFR BLD AUTO: 8.1 %
NEUTROPHILS # BLD AUTO: 4.2 10E9/L (ref 1.6–8.3)
NEUTROPHILS NFR BLD AUTO: 58.4 %
PLATELET # BLD AUTO: 266 10E9/L (ref 150–450)
POTASSIUM SERPL-SCNC: 4.5 MMOL/L (ref 3.4–5.3)
PROT SERPL-MCNC: 8.2 G/DL (ref 6.8–8.8)
RBC # BLD AUTO: 4.78 10E12/L (ref 3.8–5.2)
SODIUM SERPL-SCNC: 138 MMOL/L (ref 133–144)
TIBC SERPL-MCNC: 346 UG/DL (ref 240–430)
TSH SERPL DL<=0.005 MIU/L-ACNC: 2.31 MU/L (ref 0.4–4)
VIT B12 SERPL-MCNC: 444 PG/ML (ref 193–986)
WBC # BLD AUTO: 7.3 10E9/L (ref 4–11)

## 2020-07-15 PROCEDURE — 93000 ELECTROCARDIOGRAM COMPLETE: CPT | Performed by: PHYSICIAN ASSISTANT

## 2020-07-15 PROCEDURE — 82607 VITAMIN B-12: CPT | Performed by: PHYSICIAN ASSISTANT

## 2020-07-15 PROCEDURE — 82947 ASSAY GLUCOSE BLOOD QUANT: CPT | Performed by: PHYSICIAN ASSISTANT

## 2020-07-15 PROCEDURE — 99214 OFFICE O/P EST MOD 30 MIN: CPT | Performed by: PHYSICIAN ASSISTANT

## 2020-07-15 PROCEDURE — 83540 ASSAY OF IRON: CPT | Performed by: PHYSICIAN ASSISTANT

## 2020-07-15 PROCEDURE — 36415 COLL VENOUS BLD VENIPUNCTURE: CPT | Performed by: PHYSICIAN ASSISTANT

## 2020-07-15 PROCEDURE — 80050 GENERAL HEALTH PANEL: CPT | Performed by: PHYSICIAN ASSISTANT

## 2020-07-15 PROCEDURE — 82306 VITAMIN D 25 HYDROXY: CPT | Performed by: PHYSICIAN ASSISTANT

## 2020-07-15 PROCEDURE — 83550 IRON BINDING TEST: CPT | Performed by: PHYSICIAN ASSISTANT

## 2020-07-15 RX ORDER — CITALOPRAM HYDROBROMIDE 40 MG/1
40 TABLET ORAL DAILY
Qty: 30 TABLET | Refills: 0 | Status: SHIPPED | OUTPATIENT
Start: 2020-07-15 | End: 2020-08-24

## 2020-07-15 ASSESSMENT — PAIN SCALES - GENERAL: PAINLEVEL: MILD PAIN (2)

## 2020-07-15 ASSESSMENT — ANXIETY QUESTIONNAIRES
6. BECOMING EASILY ANNOYED OR IRRITABLE: NOT AT ALL
5. BEING SO RESTLESS THAT IT IS HARD TO SIT STILL: SEVERAL DAYS
7. FEELING AFRAID AS IF SOMETHING AWFUL MIGHT HAPPEN: SEVERAL DAYS
1. FEELING NERVOUS, ANXIOUS, OR ON EDGE: MORE THAN HALF THE DAYS
3. WORRYING TOO MUCH ABOUT DIFFERENT THINGS: SEVERAL DAYS
2. NOT BEING ABLE TO STOP OR CONTROL WORRYING: MORE THAN HALF THE DAYS
IF YOU CHECKED OFF ANY PROBLEMS ON THIS QUESTIONNAIRE, HOW DIFFICULT HAVE THESE PROBLEMS MADE IT FOR YOU TO DO YOUR WORK, TAKE CARE OF THINGS AT HOME, OR GET ALONG WITH OTHER PEOPLE: SOMEWHAT DIFFICULT
GAD7 TOTAL SCORE: 8

## 2020-07-15 ASSESSMENT — MIFFLIN-ST. JEOR: SCORE: 1305.85

## 2020-07-15 ASSESSMENT — PATIENT HEALTH QUESTIONNAIRE - PHQ9
5. POOR APPETITE OR OVEREATING: SEVERAL DAYS
SUM OF ALL RESPONSES TO PHQ QUESTIONS 1-9: 4

## 2020-07-15 NOTE — PROGRESS NOTES
"Subjective     Cristel Grissom is a 20 year old female who presents to clinic today for the following health issues:    HPI       Dizziness  Onset: 1 week    Description:   Do you feel faint:  no   Does it feel like the surroundings (bed, room) are moving: no   Unsteady/off balance: no   Have you passed out or fallen: no     Intensity: mild    Progression of Symptoms:  Improving with the dizziness/lightheadedness, but not other symptoms    Accompanying Signs & Symptoms:  Heart palpitations: no   Nausea, vomiting: YES- nausea  Weakness in arms or legs: no   Fatigue: YES  Vision or speech changes: no   Ringing in ears (Tinnitus): YES- a \"tiny bit but not a lot\"  Hearing Loss: no     History:   Head trauma/concussion hx: YES- \"minor concussion last summer\"  Previous similar symptoms: no   Recent bleeding history: no     Precipitating factors:   Worse with activity or head movement: no   Any new medications (BP?): no   Alcohol/drug abuse/withdrawal: no     Alleviating factors:   Does staying in a fixed position give relief:  no     Therapies Tried and outcome: no    She noticed that one of her nostrils feels kind of plugged a little bit started.     Patient Active Problem List   Diagnosis     Acne vulgaris     SUSU (generalized anxiety disorder)     Right eye injury, initial encounter     History reviewed. No pertinent surgical history.    Social History     Tobacco Use     Smoking status: Never Smoker     Smokeless tobacco: Never Used   Substance Use Topics     Alcohol use: No     History reviewed. No pertinent family history.      Current Outpatient Medications   Medication Sig Dispense Refill     hydrOXYzine (ATARAX) 10 MG/5ML syrup Take 12.5 mLs (25 mg) by mouth 3 times daily as needed for itching May increase to 25 mls (50 mg) (Patient not taking: Reported on 7/15/2020) 473 mL 0     norgestimate-ethinyl estradiol (ORTHO-CYCLEN, SPRINTEC) 0.25-35 MG-MCG per tablet Take 1 tablet by mouth daily (Patient not taking: " "Reported on 8/20/2019) 84 tablet 3     PARoxetine (PAXIL) 10 MG tablet Take 3 tablets (30 mg) by mouth every morning 270 tablet 3     Allergies   Allergen Reactions     Bactrim [Sulfamethoxazole W/Trimethoprim]      When very young, vomiting likely per mom     Cefzil [Cefprozil]      When very young, vomiting likely per mom     Penicillins      When very young, vomiting likely per mom     Sulfa Drugs      When very young, vomiting likely per mom     Recent Labs   Lab Test 10/05/16  0850   ALT 37   CR 0.63   GFRESTIMATED >90  Non  GFR Calc     GFRESTBLACK >90   GFR Calc     POTASSIUM 3.8      BP Readings from Last 3 Encounters:   08/20/19 92/60   08/09/19 112/70   07/24/19 106/60    Wt Readings from Last 3 Encounters:   08/20/19 63 kg (139 lb)   08/09/19 62.4 kg (137 lb 8 oz)   07/24/19 62.2 kg (137 lb 3.2 oz)                    Reviewed and updated as needed this visit by Provider         Review of Systems   Constitutional, HEENT, cardiovascular, pulmonary, GI, , musculoskeletal, neuro, skin, endocrine and psych systems are negative, except as otherwise noted.      Objective    /76   Pulse 96   Temp 97.7  F (36.5  C) (Temporal)   Resp 16   Ht 1.586 m (5' 2.44\")   Wt 58.1 kg (128 lb)   LMP 07/08/2020 (Exact Date)   BMI 23.08 kg/m    Body mass index is 23.08 kg/m .  Physical Exam   GENERAL: healthy, alert and no distress  HENT: ear canals and TM's normal, nose and mouth without ulcers or lesions  NECK: no adenopathy, no asymmetry, masses, or scars and thyroid normal to palpation  RESP: lungs clear to auscultation - no rales, rhonchi or wheezes  CV: regular rates and rhythm, normal S1 S2, no S3 or S4, no murmur, click or rub, peripheral pulses strong, no peripheral edema all the above with exception of transient tachycardia.  Tachycardia is somewhat related to respiratory effort.  ABDOMEN: soft, nontender, no hepatosplenomegaly, no masses and bowel sounds normal  MS: no " gross musculoskeletal defects noted, no edema  SKIN: no suspicious lesions or rashes  NEURO: Normal strength and tone, mentation intact and speech normal  PSYCH: affect flat, anxious and fatigued    Diagnostic Test Results:  Labs reviewed in Epic        Assessment & Plan     1. Malaise and fatigue  2. Dizziness  3. Consumes a vegan diet - beans as a source of protein, multivit with iron  4. Stomach upset  5. Chest discomfort - central pressure  6. SUSU (generalized anxiety disorder)  In the grand scope I suspect that this is most likely related to anxiety disorder and untreated anxiety.  Denies any true signs and symptoms related to depression at this point time.  We do note that her CBC was essentially normal as well as her blood sugar today.  We await further labs as noted below.  Her EKG shows a sinus tachycardia with a question of left atrial enlargement.  This will need to be followed up in the near future.  We will have her start medications in the form of Celexa as noted below.  She is to return in about 2 weeks for recheck of her condition and in 4 to 6 weeks for recheck of generalized anxiety disorder.  Strongly recommended that she begin counseling for this as soon as possible.  She states that she would like to have someone that would consider meditation as a form of treatment.  - TSH with free T4 reflex  - Comprehensive metabolic panel  - Glucose, whole blood  - Vitamin B12  - Vitamin D Deficiency  - CBC with platelets and differential  - Iron and iron binding capacity  - EKG 12-lead complete w/read - Clinics  - MENTAL HEALTH REFERRAL  - Adult; Outpatient Treatment; Individual/Couples/Family/Group Therapy/Health Psychology; [facility] [place]: [facility] [phone]; We will contact you to schedule the appointment or please call with any questions  - citalopram (CELEXA) 40 MG tablet; Take 1 tablet (40 mg) by mouth daily Take 1/2 tablet (20 mg) for 1-2 weeks, then increase to 1 tablet orally daily   Dispense: 30 tablet; Refill: 0  Return in about 2 weeks (around 7/29/2020) for Medication Recheck, recheck of current condition, if symptoms do not improve.    Vincent Davis PA-C  [facility]

## 2020-07-16 LAB — DEPRECATED CALCIDIOL+CALCIFEROL SERPL-MC: 38 UG/L (ref 20–75)

## 2020-07-16 ASSESSMENT — ANXIETY QUESTIONNAIRES: GAD7 TOTAL SCORE: 8

## 2020-08-02 ENCOUNTER — HOSPITAL ENCOUNTER (EMERGENCY)
Facility: CLINIC | Age: 20
Discharge: PSYCHIATRIC OR SUBSTANCE USE FACILITY | End: 2020-08-02
Attending: FAMILY MEDICINE | Admitting: FAMILY MEDICINE
Payer: COMMERCIAL

## 2020-08-02 ENCOUNTER — TELEPHONE (OUTPATIENT)
Dept: BEHAVIORAL HEALTH | Facility: CLINIC | Age: 20
End: 2020-08-02

## 2020-08-02 ENCOUNTER — TRANSFERRED RECORDS (OUTPATIENT)
Dept: HEALTH INFORMATION MANAGEMENT | Facility: CLINIC | Age: 20
End: 2020-08-02

## 2020-08-02 ENCOUNTER — COMMUNICATION - HEALTHEAST (OUTPATIENT)
Dept: BEHAVIORAL HEALTH | Facility: CLINIC | Age: 20
End: 2020-08-02

## 2020-08-02 VITALS
DIASTOLIC BLOOD PRESSURE: 73 MMHG | SYSTOLIC BLOOD PRESSURE: 124 MMHG | RESPIRATION RATE: 16 BRPM | TEMPERATURE: 98.2 F | OXYGEN SATURATION: 99 %

## 2020-08-02 DIAGNOSIS — F51.05 INSOMNIA DUE TO OTHER MENTAL DISORDER: ICD-10-CM

## 2020-08-02 DIAGNOSIS — F99 INSOMNIA DUE TO OTHER MENTAL DISORDER: ICD-10-CM

## 2020-08-02 DIAGNOSIS — R45.851 SUICIDAL IDEATION: ICD-10-CM

## 2020-08-02 DIAGNOSIS — F41.0 SEVERE ANXIETY WITH PANIC: ICD-10-CM

## 2020-08-02 DIAGNOSIS — R45.850 HOMICIDAL IDEATIONS: ICD-10-CM

## 2020-08-02 LAB
AMPHETAMINES UR QL: NOT DETECTED NG/ML
BARBITURATES UR QL SCN: NOT DETECTED NG/ML
BENZODIAZ UR QL SCN: NOT DETECTED NG/ML
BUPRENORPHINE UR QL: NOT DETECTED NG/ML
CANNABINOIDS UR QL: NOT DETECTED NG/ML
COCAINE UR QL SCN: NOT DETECTED NG/ML
D-METHAMPHET UR QL: NOT DETECTED NG/ML
HCG UR QL: NEGATIVE
LABORATORY COMMENT REPORT: NORMAL
METHADONE UR QL SCN: NOT DETECTED NG/ML
OPIATES UR QL SCN: NOT DETECTED NG/ML
OXYCODONE UR QL SCN: NOT DETECTED NG/ML
PCP UR QL SCN: NOT DETECTED NG/ML
PROPOXYPH UR QL: NOT DETECTED NG/ML
SARS-COV-2 RNA SPEC QL NAA+PROBE: NEGATIVE
SARS-COV-2 RNA SPEC QL NAA+PROBE: NORMAL
SPECIMEN SOURCE: NORMAL
SPECIMEN SOURCE: NORMAL
TRICYCLICS UR QL SCN: NOT DETECTED NG/ML

## 2020-08-02 PROCEDURE — 81025 URINE PREGNANCY TEST: CPT | Performed by: FAMILY MEDICINE

## 2020-08-02 PROCEDURE — 99285 EMERGENCY DEPT VISIT HI MDM: CPT | Mod: Z6 | Performed by: FAMILY MEDICINE

## 2020-08-02 PROCEDURE — 25000128 H RX IP 250 OP 636: Performed by: FAMILY MEDICINE

## 2020-08-02 PROCEDURE — 99285 EMERGENCY DEPT VISIT HI MDM: CPT | Mod: 25 | Performed by: FAMILY MEDICINE

## 2020-08-02 PROCEDURE — 80306 DRUG TEST PRSMV INSTRMNT: CPT | Performed by: FAMILY MEDICINE

## 2020-08-02 PROCEDURE — 96372 THER/PROPH/DIAG INJ SC/IM: CPT | Performed by: FAMILY MEDICINE

## 2020-08-02 PROCEDURE — 90791 PSYCH DIAGNOSTIC EVALUATION: CPT

## 2020-08-02 PROCEDURE — C9803 HOPD COVID-19 SPEC COLLECT: HCPCS | Performed by: FAMILY MEDICINE

## 2020-08-02 PROCEDURE — U0003 INFECTIOUS AGENT DETECTION BY NUCLEIC ACID (DNA OR RNA); SEVERE ACUTE RESPIRATORY SYNDROME CORONAVIRUS 2 (SARS-COV-2) (CORONAVIRUS DISEASE [COVID-19]), AMPLIFIED PROBE TECHNIQUE, MAKING USE OF HIGH THROUGHPUT TECHNOLOGIES AS DESCRIBED BY CMS-2020-01-R: HCPCS | Performed by: FAMILY MEDICINE

## 2020-08-02 RX ORDER — LORAZEPAM 2 MG/ML
1 INJECTION INTRAMUSCULAR ONCE
Status: COMPLETED | OUTPATIENT
Start: 2020-08-02 | End: 2020-08-02

## 2020-08-02 RX ADMIN — LORAZEPAM 1 MG: 2 INJECTION INTRAMUSCULAR; INTRAVENOUS at 04:06

## 2020-08-02 ASSESSMENT — ENCOUNTER SYMPTOMS
ABDOMINAL PAIN: 0
APPETITE CHANGE: 1
NERVOUS/ANXIOUS: 1
HYPERACTIVE: 1

## 2020-08-02 NOTE — ED NOTES
This RN held patients hand while doing interview with DEC. Pt did not want RN to leave room. Emotional support given throughout visit. Pt does get extremely anxious when no one is with her. Pt gets diaphoretic and states her heart races. Pt eating sandwich. Pt states appetite has not been good lately.

## 2020-08-02 NOTE — ED TRIAGE NOTES
"Pt presents with sever anxiety. Pt crying at triage. Pt states she had mom call the  office yesterday afternoon because she felt unsafe. They did come and clear here and family was going to try to manage pt at home with medications . Pt started celexa on Thursday and had doses Thursday and Friday. Pt states she has not slept all but 8 hours in the last few nites. Pt states she is anxious about the \"coronovirus and if the government is controlling us and I don't know what to believe anymore\": Pt has been diagnosed with anxiety since age 6. Mom states pt wanted mom to take the knives out of the house.\" Pt appears panicked. Pt had a job this summer but unable due to anxiety. Pt lives at home with mom , dad and two siblings. Pt has had out patient therapy in the past. Pt is very cooperative.Patient's airway, breathing, circulation, and disability/mental status (ABCDs) intact/WDL during triage.    "

## 2020-08-02 NOTE — TELEPHONE ENCOUNTER
S: [facility] ED seeking IP MH placement for a 21yo female presenting with SI, anxiety, HI towards family.     B: Pt hx of anxiety since age 6. Pt recently stopped her medication one year ago. Pt endorses recent worsening anxiety and ruminating thoughts, went to the doctor and started celexa on Sunday, has not slept since. Pt presents anxious, trembling, and crying. Pt began to have thoughts of self harm with intent to hurt herself and her family, asked her mom to hide all the knives in the home. The HI is isolated to her family only. Pt denies hx of IP MH. Pt denies acute medical or CD concerns.     A:  Medically cleared, utox and COVID pending, Pt is voluntary.     R: Patient cleared and ready for behavioral bed placement: Yes  Pt accepted for admission to [facility] unit [facility].

## 2020-08-02 NOTE — ED PROVIDER NOTES
"  History     Chief Complaint   Patient presents with     Anxiety     Suicidal thoughts     HPI  Cristel Grissom is a 20 year old female who presents to the ER with concerns about severe chronic anxiety that is getting worse.  For anxiety.  It is gotten to the point where she cannot sleep anymore.  She stated that she was started on a medicine called Celexa but is only taken 2 doses of the medicine because her symptoms became so much more severe after taking it she does not want to take any additional.  She had increasing thoughts of self-harm earlier yesterday around noon and called for the ambulance.  They are able to calm her down and thought she was safe at that point but she states that she has had increasing thoughts of harming others tonight which really scares her.  She states that she has not slept more than 8 hours in the last 3 days due to her severe anxiety state.  She presents with her mother.  Patient states that she was seen in our clinic and had a bunch of blood work done about 2 weeks ago prior to starting the Celexa.  She was told that the blood work was okay or normal.    I reviewed the following nurse triage note:  Pt presents with sever anxiety. Pt crying at triage. Pt states she had mom call the  office yesterday afternoon because she felt unsafe. They did come and clear here and family was going to try to manage pt at home with medications . Pt started celexa on Thursday and had doses Thursday and Friday. Pt states she has not slept all but 8 hours in the last few nites. Pt states she is anxious about the \"coronovirus and if the government is controlling us and I don't know what to believe anymore\": Pt has been diagnosed with anxiety since age 6. Mom states pt wanted mom to take the knives out of the house.\" Pt appears panicked. Pt had a job this summer but unable due to anxiety. Pt lives at home with mom , dad and two siblings. Pt has had out patient therapy in the past. Pt is very " cooperative.    Component      Latest Ref Rng & Units 7/15/2020 7/15/2020           1:51 PM  1:51 PM   WBC      4.0 - 11.0 10e9/L 7.3    RBC Count      3.8 - 5.2 10e12/L 4.78    Hemoglobin      11.7 - 15.7 g/dL 14.6    Hematocrit      35.0 - 47.0 % 41.8    MCV      78 - 100 fl 87    MCH      26.5 - 33.0 pg 30.5    MCHC      31.5 - 36.5 g/dL 34.9    RDW      10.0 - 15.0 % 11.4    Platelet Count      150 - 450 10e9/L 266    % Neutrophils      % 58.4    % Lymphocytes      % 30.6    % Monocytes      % 8.1    % Eosinophils      % 2.6    % Basophils      % 0.3    Absolute Neutrophil      1.6 - 8.3 10e9/L 4.2    Absolute Lymphocytes      0.8 - 5.3 10e9/L 2.2    Absolute Monocytes      0.0 - 1.3 10e9/L 0.6    Absolute Eosinophils      0.0 - 0.7 10e9/L 0.2    Absolute Basophils      0.0 - 0.2 10e9/L 0.0    Diff Method       Automated Method    Sodium      133 - 144 mmol/L  138   Potassium      3.4 - 5.3 mmol/L  4.5   Chloride      94 - 109 mmol/L  105   Carbon Dioxide      20 - 32 mmol/L  26   Anion Gap      3 - 14 mmol/L  7   Glucose      70 - 99 mg/dL 86 94   Urea Nitrogen      7 - 30 mg/dL  10   Creatinine      0.52 - 1.04 mg/dL  0.73   GFR Estimate      >60 mL/min/1.73:m2  >90   GFR Estimate If Black      >60 mL/min/1.73:m2  >90   Calcium      8.5 - 10.1 mg/dL  9.4   Bilirubin Total      0.2 - 1.3 mg/dL  0.4   Albumin      3.4 - 5.0 g/dL  4.5   Protein Total      6.8 - 8.8 g/dL  8.2   Alkaline Phosphatase      40 - 150 U/L  72   ALT      0 - 50 U/L  13   AST      0 - 45 U/L  8   Iron      35 - 180 ug/dL 91    Iron Binding Cap      240 - 430 ug/dL 346    Iron Saturation Index      15 - 46 % 26    TSH      0.40 - 4.00 mU/L 2.31    Vitamin B12      193 - 986 pg/mL 444    Vitamin D Deficiency screening      20 - 75 ug/L 38        Allergies:  Allergies   Allergen Reactions     Bactrim [Sulfamethoxazole W/Trimethoprim]      When very young, vomiting likely per mom     Cefzil [Cefprozil]      When very young, vomiting likely  per mom     Penicillins      When very young, vomiting likely per mom     Sulfa Drugs      When very young, vomiting likely per mom       Problem List:    Patient Active Problem List    Diagnosis Date Noted     Consumes a vegan diet - beans as a source of protein, multivit with iron 07/15/2020     Priority: Medium     Dizziness 07/15/2020     Priority: Medium     Malaise and fatigue 07/15/2020     Priority: Medium     Stomach upset 07/15/2020     Priority: Medium     Chest discomfort - central pressure 07/15/2020     Priority: Medium     Right eye injury, initial encounter 08/09/2019     Priority: Medium     Acne vulgaris 01/25/2017     Priority: Medium     SUSU (generalized anxiety disorder) 01/25/2017     Priority: Medium        Past Medical History:    No past medical history on file.    Past Surgical History:    No past surgical history on file.    Family History:    No family history on file.    Social History:  Marital Status:  Single [1]  Social History     Tobacco Use     Smoking status: Never Smoker     Smokeless tobacco: Never Used   Substance Use Topics     Alcohol use: No     Drug use: No        Medications:    citalopram (CELEXA) 40 MG tablet  hydrOXYzine (ATARAX) 10 MG/5ML syrup      Review of Systems   Constitutional: Positive for appetite change.   Gastrointestinal: Negative for abdominal pain.   Psychiatric/Behavioral: Positive for suicidal ideas. The patient is nervous/anxious and is hyperactive.    All other systems reviewed and are negative.      Physical Exam   BP: 124/73  Heart Rate: 105  Temp: 98.2  F (36.8  C)  Resp: 16  SpO2: 99 %      Physical Exam  Vitals signs and nursing note reviewed. Exam conducted with a chaperone present.   Constitutional:       General: She is in acute distress.      Appearance: She is normal weight. She is not ill-appearing, toxic-appearing or diaphoretic.   HENT:      Head: Normocephalic and atraumatic.      Nose: Nose normal.   Eyes:      Extraocular Movements:  Extraocular movements intact.      Conjunctiva/sclera: Conjunctivae normal.      Pupils: Pupils are equal, round, and reactive to light.   Neck:      Musculoskeletal: Normal range of motion and neck supple.   Cardiovascular:      Rate and Rhythm: Tachycardia present.      Pulses: Normal pulses.      Heart sounds: No murmur.   Pulmonary:      Effort: Pulmonary effort is normal. No respiratory distress.      Breath sounds: No stridor. No wheezing, rhonchi or rales.   Abdominal:      General: Abdomen is flat.      Comments: Patient with mild diffuse abdominal tenderness with palpation but no specific point tender areas.   Musculoskeletal: Normal range of motion.   Skin:     General: Skin is warm.      Capillary Refill: Capillary refill takes less than 2 seconds.      Findings: No bruising, erythema or lesion.   Neurological:      General: No focal deficit present.      Mental Status: She is alert and oriented to person, place, and time.   Psychiatric:         Attention and Perception: She does not perceive auditory or visual hallucinations.         Mood and Affect: Mood is anxious. Affect is tearful.         Speech: Speech is rapid and pressured.         Behavior: Behavior is agitated and hyperactive. Behavior is cooperative.         Thought Content: Thought content is paranoid. Thought content is not delusional. Thought content includes homicidal and suicidal ideation.         Cognition and Memory: Cognition and memory normal.         ED Course     Procedures               Critical Care time:  none               Results for orders placed or performed during the hospital encounter of 08/02/20 (from the past 24 hour(s))   Urine Drugs of Abuse Screen Panel 13   Result Value Ref Range    Cannabinoids (72-[id number]-3-[id number]-9-THC) Not Detected NDET^Not Detected ng/mL    Phencyclidine (Phencyclidine) Not Detected NDET^Not Detected ng/mL    Cocaine (Benzoylecgonine) Not Detected NDET^Not Detected ng/mL    Methamphetamine  (d-Methamphetamine) Not Detected NDET^Not Detected ng/mL    Opiates (Morphine) Not Detected NDET^Not Detected ng/mL    Amphetamine (d-Amphetamine) Not Detected NDET^Not Detected ng/mL    Benzodiazepines (Nordiazepam) Not Detected NDET^Not Detected ng/mL    Tricyclic Antidepressants (Desipramine) Not Detected NDET^Not Detected ng/mL    Methadone (Methadone) Not Detected NDET^Not Detected ng/mL    Barbiturates (Butalbital) Not Detected NDET^Not Detected ng/mL    Oxycodone (Oxycodone) Not Detected NDET^Not Detected ng/mL    Propoxyphene (Norpropoxyphene) Not Detected NDET^Not Detected ng/mL    Buprenorphine (Buprenorphine) Not Detected NDET^Not Detected ng/mL   HCG qualitative urine (UPT)   Result Value Ref Range    HCG Qual Urine Negative NEG^Negative       Medications   LORazepam (ATIVAN) injection 1 mg (1 mg Intramuscular Given 8/2/20 0406)       Assessments & Plan (with Medical Decision Making)  20-year-old female to the ER with her mother secondary concerns of suicidal and homicidal ideation that is progressively getting more severe to the point where the patient is quite concerned she might harm her self or others.  Patient states that she has been anxious since she was 6 years of age with recent evaluation for worsening anxiety issues.  She was started on Celexa but could not tolerate the side effects of the medication with the first 2 doses.  She states that it caused increased agitation, hyperactivity, and thoughts of self-harm.  Tonight she is quite worried about the thoughts of harming others.  Her mother is concerned about removing the knives in their home.  Patient was evaluated by the DEC service.  After discussion with the DEC service in their evaluation it is my opinion the patient is in need of acute inpatient psychiatric evaluation.  Patient is in agreement with that assessment and plan of care as well as is her mother.  Patient placed on a health officer hold and the patient notified of such.  After  the patient's interview with the DEC service the patient was given a dose of Ativan for her extreme anxiety and tremor.  Patient's had multiple blood test done recently and those were reviewed and not repeated today.  A urine drug screen was found to be negative for any signs of amphetamine, methamphetamine, or other drugs of abuse use.  Dr. Mooney at [facility] in [place] accept the patient in transfer to their facility.  Patient will be transported by [facility] ambulance.     I have reviewed the nursing notes.    I have reviewed the findings, diagnosis, plan and need for transfer with the patient and her mother..       Final diagnoses:   Suicidal ideation   Severe anxiety with panic   Homicidal ideations   Insomnia due to other mental disorder       8/2/2020   [facility] EMERGENCY DEPARTMENT     Dewayne Finch, DO  08/02/20 0434       Dewayne Finch, DO  08/02/20 0611

## 2020-08-06 LAB
ALT SERPL-CCNC: <9 U/L (ref 0–45)
AST SERPL-CCNC: 9 U/L (ref 0–40)
CREAT SERPL-MCNC: 0.68 MG/DL (ref 0.6–1.1)
GFR SERPL CREATININE-BSD FRML MDRD: >60 ML/MIN/1.73M2
GLUCOSE SERPL-MCNC: 83 MG/DL (ref 70–125)
POTASSIUM SERPL-SCNC: 4.2 MMOL/L (ref 3.5–5)

## 2020-08-24 ENCOUNTER — HOSPITAL ENCOUNTER (EMERGENCY)
Facility: CLINIC | Age: 20
Discharge: HOME | End: 2020-08-24
Attending: EMERGENCY MEDICINE | Admitting: EMERGENCY MEDICINE
Payer: COMMERCIAL

## 2020-08-24 ENCOUNTER — HOSPITAL ENCOUNTER (OUTPATIENT)
Dept: BEHAVIORAL HEALTH | Facility: CLINIC | Age: 20
Discharge: HOME | End: 2020-08-24
Attending: FAMILY MEDICINE | Admitting: FAMILY MEDICINE
Payer: COMMERCIAL

## 2020-08-24 ENCOUNTER — NURSE TRIAGE (OUTPATIENT)
Dept: FAMILY MEDICINE | Facility: OTHER | Age: 20
End: 2020-08-24

## 2020-08-24 VITALS
OXYGEN SATURATION: 99 % | RESPIRATION RATE: 16 BRPM | SYSTOLIC BLOOD PRESSURE: 99 MMHG | HEART RATE: 89 BPM | DIASTOLIC BLOOD PRESSURE: 67 MMHG | TEMPERATURE: 98.7 F

## 2020-08-24 DIAGNOSIS — K59.00 CONSTIPATION, UNSPECIFIED CONSTIPATION TYPE: ICD-10-CM

## 2020-08-24 DIAGNOSIS — R42 DIZZINESS: ICD-10-CM

## 2020-08-24 PROCEDURE — 90791 PSYCH DIAGNOSTIC EVALUATION: CPT | Mod: 95 | Performed by: SOCIAL WORKER

## 2020-08-24 PROCEDURE — 99282 EMERGENCY DEPT VISIT SF MDM: CPT | Mod: Z6 | Performed by: EMERGENCY MEDICINE

## 2020-08-24 PROCEDURE — 99282 EMERGENCY DEPT VISIT SF MDM: CPT | Performed by: EMERGENCY MEDICINE

## 2020-08-24 RX ORDER — PROPRANOLOL HYDROCHLORIDE 10 MG/1
10 TABLET ORAL
COMMUNITY
Start: 2020-08-21 | End: 2020-08-24

## 2020-08-24 RX ORDER — GABAPENTIN 100 MG/1
200 CAPSULE ORAL
COMMUNITY
Start: 2020-08-21 | End: 2020-09-21

## 2020-08-24 RX ORDER — QUETIAPINE FUMARATE 200 MG/1
200 TABLET, FILM COATED ORAL
COMMUNITY
Start: 2020-08-21 | End: 2020-09-21

## 2020-08-24 RX ORDER — HYDROXYZINE HYDROCHLORIDE 50 MG/1
50 TABLET, FILM COATED ORAL
COMMUNITY
Start: 2020-08-21 | End: 2020-09-21

## 2020-08-24 RX ORDER — GABAPENTIN 100 MG/1
100 CAPSULE ORAL 3 TIMES DAILY
COMMUNITY
End: 2020-12-31

## 2020-08-24 RX ORDER — QUETIAPINE FUMARATE 50 MG/1
50 TABLET, FILM COATED ORAL
COMMUNITY
Start: 2020-08-21 | End: 2020-12-31 | Stop reason: ALTCHOICE

## 2020-08-24 RX ORDER — PROPRANOLOL HYDROCHLORIDE 10 MG/1
10 TABLET ORAL 3 TIMES DAILY
COMMUNITY
End: 2020-09-21

## 2020-08-24 RX ORDER — SODIUM CHLORIDE 9 MG/ML
INJECTION, SOLUTION INTRAVENOUS CONTINUOUS
Status: DISCONTINUED | OUTPATIENT
Start: 2020-08-24 | End: 2020-08-24 | Stop reason: HOSPADM

## 2020-08-24 RX ORDER — BENZTROPINE MESYLATE 1 MG/1
1 TABLET ORAL
COMMUNITY
Start: 2020-08-21 | End: 2020-09-21

## 2020-08-24 ASSESSMENT — COLUMBIA-SUICIDE SEVERITY RATING SCALE - C-SSRS
4. HAVE YOU HAD THESE THOUGHTS AND HAD SOME INTENTION OF ACTING ON THEM?: NO
6. HAVE YOU EVER DONE ANYTHING, STARTED TO DO ANYTHING, OR PREPARED TO DO ANYTHING TO END YOUR LIFE?: NO
5. HAVE YOU STARTED TO WORK OUT OR WORKED OUT THE DETAILS OF HOW TO KILL YOURSELF? DO YOU INTEND TO CARRY OUT THIS PLAN?: NO
1. IN THE PAST MONTH, HAVE YOU WISHED YOU WERE DEAD OR WISHED YOU COULD GO TO SLEEP AND NOT WAKE UP?: NO
TOTAL  NUMBER OF INTERRUPTED ATTEMPTS PAST 3 MONTHS: NO
ATTEMPT LIFETIME: NO
TOTAL  NUMBER OF INTERRUPTED ATTEMPTS LIFETIME: NO
6. HAVE YOU EVER DONE ANYTHING, STARTED TO DO ANYTHING, OR PREPARED TO DO ANYTHING TO END YOUR LIFE?: NO
TOTAL  NUMBER OF ABORTED OR SELF INTERRUPTED ATTEMPTS PAST LIFETIME: NO
2. HAVE YOU ACTUALLY HAD ANY THOUGHTS OF KILLING YOURSELF?: NO
ATTEMPT PAST THREE MONTHS: NO
TOTAL  NUMBER OF ABORTED OR SELF INTERRUPTED ATTEMPTS PAST 3 MONTHS: NO

## 2020-08-24 ASSESSMENT — PAIN SCALES - GENERAL: PAINLEVEL: MILD PAIN (2)

## 2020-08-24 NOTE — TELEPHONE ENCOUNTER
Face-to-face follow-up visit is advised to check blood pressure and signs and symptoms.  Electronically signed:    Vincent Davis PA-C

## 2020-08-24 NOTE — PROGRESS NOTES
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 Grissom  MRN:   [id number]    SAFETY PLAN:    Step 1: Warning signs / cues (Thoughts, images, mood, situation, behavior) that a crisis may be developing:      Thoughts: \"Nothing makes it better\"    Images: none    Thinking Processes: ruminations (can't stop thinking about my problems) and racing thoughts    Mood: worsening depression and intense worry    Behaviors: not taking care of myself    Situations: changes in symptoms: worsening anxiety       Step 2: Coping strategies - Things I can do to take my mind off of my problems without contacting another person (relaxation technique, physical activity):      Distress Tolerance Strategies:   Playing piano, drawing, listening to music, essential oils    Physical Activities:  walking.    Focus on helpful thoughts:  \"I will get through this\"    Step 3: People and social settings that provide distraction:     Name: Lanie (Friend) Phone:        Step 4: Remind myself of people and things that are important to me and worth living for:  My family and friends    Step 5: When I am in crisis, I can ask these people to help me use my safety plan:     Name: Fabienne (mother)     Step 6: Making the environment safe:        \"no, I 've been doing good.\" "     Step 7: Professionals or agencies I can contact during a crisis:      Suicide Prevention Lifeline: 8-383-453-TALK (5463)    Crisis Text Line Service (available 24 hours a day, 7 days a week): Text [place] to 058534    Call  **CRISIS (860049) from a cell phone to talk to a team of professionals who can help you.    Crisis Services By [facility]: Phone Number:   [place]     [phone]   [place]    [phone]   [place]    [phone]   [place]    [phone]   [place]    [phone]   [place] [phone]   [place]     [phone]       Call 911 or go to my nearest emergency department.     I helped develop this safety plan and agree to use it when needed.  I have been given a copy of this plan.        Today s date:  8/24/2020  Adapted from Safety Plan Template 2008 Britney Randall and Satya Oshea is reprinted with the express permission of the authors.  No portion of the Safety Plan Template may be reproduced without the express, written permission.  You can contact the authors at [email or web address] or [email or web address]

## 2020-08-24 NOTE — TELEPHONE ENCOUNTER
Reason for Call:  Other call back    Detailed comments: patient was discharged from [facility] and she is taking propranolol 10 mg 3 times daily and its making her dizzy should she stop taking it or decrease it? Please advise thank you    Phone Number Patient can be reached at: Home number on file [phone] (home)    Best Time: anytime    Can we leave a detailed message on this number? YES    Call taken on 8/24/2020 at 1:57 PM by Kyleigh Stoddard

## 2020-08-24 NOTE — ED AVS SNAPSHOT
[facility]  [address] [place] [address]  Phone:  [phone]  Fax:  [phone]                                    Cristel Grissom   MRN: [id number]    Department:  [facility]   Date of Visit:  8/24/2020           After Visit Summary Signature Page    I have received my discharge instructions, and my questions have been answered. I have discussed any challenges I see with this plan with the nurse or doctor.    ..........................................................................................................................................  Patient/Patient Representative Signature      ..........................................................................................................................................  Patient Representative Print Name and Relationship to Patient    ..................................................               ................................................  Date                                   Time    ..........................................................................................................................................  Reviewed by Signature/Title    ...................................................              ..............................................  Date                                               Time          22EPIC Rev 08/18

## 2020-08-24 NOTE — TELEPHONE ENCOUNTER
"Patient triaged based on medication side effects. Patient concerns with dizziness, vision changes, headache, and not eating or drinking well. Prescribed propranolol by behavioral to keep pulse down/anxiety per patient. Declines feeling dizzy during triage but relates it to lightheadedness. Writer concerned for hydration status. Patient concerns for this as well. Advised to present to ED with father driving her. Agreeable to plan.    Lita Mireles RN           1. DESCRIPTION: \"Describe your dizziness.\"      Waves of dizziness    2. LIGHTHEADED: \"Do you feel lightheaded?\" (e.g., somewhat faint, woozy, weak upon standing)      No onset similarities, does sometimes happen when changing positions.    3. VERTIGO: \"Do you feel like either you or the room is spinning or tilting?\" (i.e. vertigo)      No, more lightheaded    4. SEVERITY: \"How bad is it?\"  \"Do you feel like you are going to faint?\" \"Can you stand and walk?\"    - MILD - walking normally    - MODERATE - interferes with normal activities (e.g., work, school)     - SEVERE - unable to stand, requires support to walk, feels like passing out now.       Mild-moderate    5. ONSET:  \"When did the dizziness begin?\"      Lightheaded off and on    6. AGGRAVATING FACTORS: \"Does anything make it worse?\" (e.g., standing, change in head position)      Movement sometimes    7. HEART RATE: \"Can you tell me your heart rate?\" \"How many beats in 15 seconds?\"  (Note: not all patients can do this)        Currently at 85    8. CAUSE: \"What do you think is causing the dizziness?\"      Unknown, but patient believes due to medication    9. RECURRENT SYMPTOM: \"Have you had dizziness before?\" If so, ask: \"When was the last time?\" \"What happened that time?\"      Yes with medication    10. OTHER SYMPTOMS: \"Do you have any other symptoms?\" (e.g., fever, chest pain, vomiting, diarrhea, bleeding)        None per patient    11. PREGNANCY: \"Is there any chance you are pregnant?\" \"When was your " "last menstrual period?\"        None per patient    Additional Information    Negative: Shock suspected (e.g., cold/pale/clammy skin, too weak to stand, low BP, rapid pulse)    Negative: Difficult to awaken or acting confused (e.g., disoriented, slurred speech)    Negative: Fainted, and still feels dizzy afterwards    Negative: Severe difficulty breathing (e.g., struggling for each breath, speaks in single words)    Negative: Overdose (accidental or intentional) of medications    Negative: New neurologic deficit that is present now: * Weakness of the face, arm, or leg on one side of the body * Numbness of the face, arm, or leg on one side of the body * Loss of speech or garbled speech    Negative: Heart beating < 50 beats per minute OR > 140 beats per minute    Negative: Sounds like a life-threatening emergency to the triager    Negative: Chest pain    Negative: Rectal bleeding, bloody stool, or tarry-black stool    Negative: Vomiting is the main symptom    Negative: Diarrhea is the main symptom    Negative: Headache is the main symptom    Negative: Heat exhaustion suspected (i.e., dehydration from heat exposure)    Negative: Patient states that he/she is having an anxiety/panic attack    Negative: SEVERE dizziness (e.g., unable to stand, requires support to walk, feels like passing out now)    Negative: Spinning or tilting sensation (vertigo) present now and one or more stroke risk factors (i.e., hypertension, diabetes, prior stroke/TIA, heart attack, age over 60) (Exception: prior physician evaluation for this AND no different/worse than usual)    Negative: Extra heart beats OR irregular heart beating (i.e., 'palpitations')    Negative: Difficulty breathing    Negative: Drinking very little and has signs of dehydration (e.g., no urine > 12 hours, very dry mouth, very lightheaded)    Negative: Follows bleeding (e.g., stomach, rectum, vagina) (Exception: became dizzy from sight of small amount blood)    Negative: " Patient sounds very sick or weak to the triager    Loss of vision or double vision    SEVERE headache or neck pain    Protocols used: DIZZINESS-A-OH

## 2020-08-24 NOTE — ED TRIAGE NOTES
Patient presents to ED for concerns of adverse affects of medication. She was recently started on Propranolol, hydroxyzine, and seroquel after being inpatient for mental health. States she has been having headaches and lightheadedness. States she is not having symptoms right now.

## 2020-08-24 NOTE — ED PROVIDER NOTES
History     Chief Complaint   Patient presents with     Medication Problem     The history is provided by the patient and a parent.     Cristel Grissom is a 20 year old female who presents to the emergency department for a medication problem. Patient is brought to the ED by her father, they are concerned for possible adverse side effects of medications she has started recently. She was recently discharged from the hospital on 8/21/20 and started on Propranolol, Hydroxyzine and Seroquel. Father reports patient has been complaining about having headaches, lightheadedness, constipation, problems with her eyes and not eating or drinking water well. Patient denies any nausea, vomiting, rashes, itching, swelling in her throat or trouble breathing. Patient does have many upcoming appointments with behavioral health, an upcoming appointment with Pamella Robles on 8/26/20 and with Psychology on 9/16/20. She did talk to nurse triage today, have a mychart medical advise today and also had a behavioral video chat done today.    Allergies:  Allergies   Allergen Reactions     Bactrim [Sulfamethoxazole W/Trimethoprim]      When very young, vomiting likely per mom     Cefzil [Cefprozil]      When very young, vomiting likely per mom     Penicillins      When very young, vomiting likely per mom     Sulfa Drugs      When very young, vomiting likely per mom       Problem List:    Patient Active Problem List    Diagnosis Date Noted     Consumes a vegan diet - beans as a source of protein, multivit with iron 07/15/2020     Priority: Medium     Dizziness 07/15/2020     Priority: Medium     Malaise and fatigue 07/15/2020     Priority: Medium     Stomach upset 07/15/2020     Priority: Medium     Chest discomfort - central pressure 07/15/2020     Priority: Medium     Right eye injury, initial encounter 08/09/2019     Priority: Medium     Acne vulgaris 01/25/2017     Priority: Medium     SUSU (generalized anxiety disorder) 01/25/2017      Priority: Medium        Past Medical History:    Past Medical History:   Diagnosis Date     Adjustment disorder      Anxiety      Depression      PTSD (post-traumatic stress disorder)        Past Surgical History:    History reviewed. No pertinent surgical history.    Family History:    Family History   Problem Relation Age of Onset     Substance Abuse Maternal Uncle        Social History:  Marital Status:  Single [1]  Social History     Tobacco Use     Smoking status: Never Smoker     Smokeless tobacco: Never Used   Substance Use Topics     Alcohol use: No     Drug use: Not Currently     Types: Marijuana        Medications:    benztropine (COGENTIN) 1 MG tablet  gabapentin (NEURONTIN) 100 MG capsule  hydrOXYzine (ATARAX) 50 MG tablet  propranolol (INDERAL) 10 MG tablet  QUEtiapine (SEROQUEL) 200 MG tablet  gabapentin (NEURONTIN) 100 MG capsule  QUEtiapine (SEROQUEL) 50 MG tablet          Review of Systems   All other systems reviewed and are negative.      Physical Exam   BP: 99/67  Pulse: 89  Temp: 98.7  F (37.1  C)  Resp: 16  SpO2: 99 %  Lying Orthostatic BP: 98/60  Lying Orthostatic Pulse: 83 bpm  Sitting Orthostatic BP: 107/61  Sitting Orthostatic Pulse: 85 bpm  Standing Orthostatic BP: 99/66  Standing Orthostatic Pulse: 94 bpm      Physical Exam  Vitals signs and nursing note reviewed.   Constitutional:       General: She is not in acute distress.     Appearance: She is well-developed. She is not diaphoretic.   HENT:      Head: Normocephalic and atraumatic.   Eyes:      General: No scleral icterus.     Extraocular Movements: Extraocular movements intact.      Comments: Pupils dilated, otherwise equil and reactive.   Neck:      Musculoskeletal: Normal range of motion and neck supple.   Cardiovascular:      Rate and Rhythm: Normal rate.   Pulmonary:      Effort: Pulmonary effort is normal.   Musculoskeletal: Normal range of motion.   Skin:     General: Skin is warm and dry.      Coloration: Skin is not pale.       Findings: No erythema or rash.   Neurological:      Mental Status: She is alert and oriented to person, place, and time.         ED Course        Procedures               Critical Care time:  none               No results found for this or any previous visit (from the past 24 hour(s)).    Medications - No data to display    Assessments & Plan (with Medical Decision Making)  Cristel is a 20-year-old female who presents to the ER for possible medication side effect.  She has had some dizziness and lightheadedness, as well as constipation and vision changes since being hospitalized for psychiatric treatment and starting on new medications.  She was discharged from inpatient psychiatric treatment at [facility] at age [age].  She had been started on propranolol, hydroxyzine, and Seroquel.  She says since that time she is intermittently had symptoms.  See history and focused physical exam as above  Well-appearing 20-year-old female, no acute distress.  Her vital signs are within normal limits, and she is afebrile.  Discussed with both Cristel and her father at the bedside that we do not adjust psychiatric medications from the ER and that she would be better served to follow-up with psychology and her primary regarding these.  However, we can do an IV to check some basic lab work and give IV fluid bolus and see if this will help.  Orthostatic blood pressures were checked and were normal.  Initially family agreed to this, but when the nurse brought in the IV to get it started she changed her mind and declined any further treatment or work-up.  Had another long discussion with Cristel and her dad, he was also concerned about constipation, for which I said we can do a KUB and other work-up.  Cristel did not feel the need to do any further work-up at this time since she is feeling asymptomatic.  She will try MiraLAX at home and magnesium citrate if the MiraLAX is not working.  She will keep her follow-up appointments with her  primary provider and psychiatry as previously scheduled.  Discussed reasons to return to the ER and she was discharged in no acute distress     I have reviewed the nursing notes.    I have reviewed the findings, diagnosis, plan and need for follow up with the patient.       Discharge Medication List as of 8/24/2020  5:50 PM          Final diagnoses:   Constipation, unspecified constipation type   Dizziness     This document serves as a record of services personally performed by Coby Bentley DO. It was created on their behalf by Charmaine Verdin, a trained medical scribe. The creation of this record is based on the provider's personal observations and the statements of the patient. This document has been checked and approved by the attending provider.    Note: Chart documentation done in part with Dragon Voice Recognition software. Although reviewed after completion, some word and grammatical errors may remain.    8/24/2020   [facility] EMERGENCY DEPARTMENT     Coby Bentley DO  08/24/20 2029

## 2020-08-24 NOTE — DISCHARGE INSTRUCTIONS
You may take MiraLAX daily to help with your constipation.  You should take it daily until you have 1 soft bowel movement per day without straining    Additionally, you may try magnesium citrate over-the-counter to help with constipation.  You should be at home or in a comfortable setting when you plan to take this since it acts more quickly than MiraLAX    Continue your medications as previously prescribed, no changes were made today.  Your primary provider should make changes in regards to this    You can check your blood pressure at home 1 or 2 times per day to be sure that you are blood pressure is not dropping so low and causing your symptoms.  Keep a record of these numbers to show your primary provider, it may be helpful if they determine they need to change some of your medicine    Return to the ER if you have any new or worsening symptoms

## 2020-08-24 NOTE — PATIENT INSTRUCTIONS
"MY COPING PLAN FOR SAFETY    PATIENT'S NAME: Cristel Grissom  MRN:   [id number]    SAFETY PLAN:    Step 1: Warning signs / cues (Thoughts, images, mood, situation, behavior) that a crisis may be developing:      Thoughts: \"Nothing makes it better\"    Images: none    Thinking Processes: ruminations (can't stop thinking about my problems) and racing thoughts    Mood: worsening depression and intense worry    Behaviors: not taking care of myself    Situations: changes in symptoms: worsening anxiety       Step 2: Coping strategies - Things I can do to take my mind off of my problems without contacting another person (relaxation technique, physical activity):      Distress Tolerance Strategies:   Playing piano, drawing, listening to music, essential oils    Physical Activities:  walking.    Focus on helpful thoughts:  \"I will get through this\"    Step 3: People and social settings that provide distraction:     Name: Lanie (Friend) Phone:        Step 4: Remind myself of people and things that are important to me and worth living for:  My family and friends    Step 5: When I am in crisis, I can ask these people to help me use my safety plan:     Name: Fabienne (mother)     Step 6: Making the environment safe:        \"no, I 've been doing good.\"     Step 7: Professionals or agencies I can contact during a crisis:      Suicide Prevention Lifeline: 6-800-997-USWX (9103)    Crisis Text Line Service (available 24 hours a day, 7 days a week): Text [place] to 855172    Call  **CRISIS (090877) from a cell phone to talk to a team of professionals who can help you.    Crisis Services By [facility]: Phone Number:   [place]     [phone]   [place]    [phone]   [place]    [phone]   [place]    [phone]   [place]    [phone]   [place] [phone]   [place]     [phone]       Call 911 or go to my nearest emergency department.     I helped develop this safety plan and agree to use it when needed.  I have been given a copy " of this plan.        Today s date:  8/24/2020  Adapted from Safety Plan Template 2008 Britney Randall and Satya Oshea is reprinted with the express permission of the authors.  No portion of the Safety Plan Template may be reproduced without the express, written permission.  You can contact the authors at [email or web address] or [email or web address]

## 2020-08-25 ASSESSMENT — ANXIETY QUESTIONNAIRES
1. FEELING NERVOUS, ANXIOUS, OR ON EDGE: MORE THAN HALF THE DAYS
IF YOU CHECKED OFF ANY PROBLEMS ON THIS QUESTIONNAIRE, HOW DIFFICULT HAVE THESE PROBLEMS MADE IT FOR YOU TO DO YOUR WORK, TAKE CARE OF THINGS AT HOME, OR GET ALONG WITH OTHER PEOPLE: VERY DIFFICULT
5. BEING SO RESTLESS THAT IT IS HARD TO SIT STILL: MORE THAN HALF THE DAYS
3. WORRYING TOO MUCH ABOUT DIFFERENT THINGS: SEVERAL DAYS
2. NOT BEING ABLE TO STOP OR CONTROL WORRYING: SEVERAL DAYS
7. FEELING AFRAID AS IF SOMETHING AWFUL MIGHT HAPPEN: MORE THAN HALF THE DAYS
6. BECOMING EASILY ANNOYED OR IRRITABLE: MORE THAN HALF THE DAYS
GAD7 TOTAL SCORE: 11

## 2020-08-25 ASSESSMENT — PATIENT HEALTH QUESTIONNAIRE - PHQ9
5. POOR APPETITE OR OVEREATING: SEVERAL DAYS
SUM OF ALL RESPONSES TO PHQ QUESTIONS 1-9: 10

## 2020-08-26 ENCOUNTER — TELEPHONE (OUTPATIENT)
Dept: BEHAVIORAL HEALTH | Facility: CLINIC | Age: 20
End: 2020-08-26

## 2020-08-26 ENCOUNTER — HOSPITAL ENCOUNTER (OUTPATIENT)
Dept: BEHAVIORAL HEALTH | Facility: CLINIC | Age: 20
End: 2020-08-26
Attending: PSYCHIATRY & NEUROLOGY
Payer: COMMERCIAL

## 2020-08-26 ENCOUNTER — E-VISIT (OUTPATIENT)
Dept: FAMILY MEDICINE | Facility: OTHER | Age: 20
End: 2020-08-26

## 2020-08-26 DIAGNOSIS — Z53.9 ERRONEOUS ENCOUNTER--DISREGARD: Primary | ICD-10-CM

## 2020-08-26 PROBLEM — F33.9 MDD (MAJOR DEPRESSIVE DISORDER), RECURRENT EPISODE (H): Status: ACTIVE | Noted: 2020-08-26

## 2020-08-26 PROCEDURE — H0035 MH PARTIAL HOSP TX UNDER 24H: HCPCS | Mod: 95

## 2020-08-26 ASSESSMENT — ANXIETY QUESTIONNAIRES
2. NOT BEING ABLE TO STOP OR CONTROL WORRYING: SEVERAL DAYS
GAD7 TOTAL SCORE: 13
GAD7 TOTAL SCORE: 11
7. FEELING AFRAID AS IF SOMETHING AWFUL MIGHT HAPPEN: SEVERAL DAYS
3. WORRYING TOO MUCH ABOUT DIFFERENT THINGS: MORE THAN HALF THE DAYS
5. BEING SO RESTLESS THAT IT IS HARD TO SIT STILL: MORE THAN HALF THE DAYS
6. BECOMING EASILY ANNOYED OR IRRITABLE: MORE THAN HALF THE DAYS
7. FEELING AFRAID AS IF SOMETHING AWFUL MIGHT HAPPEN: SEVERAL DAYS
4. TROUBLE RELAXING: MORE THAN HALF THE DAYS
GAD7 TOTAL SCORE: 13
1. FEELING NERVOUS, ANXIOUS, OR ON EDGE: NEARLY EVERY DAY

## 2020-08-26 NOTE — GROUP NOTE
Psychotherapy Group Note    PATIENT'S NAME: Cristel Grissom  MRN:   [id number]  :   2000  ACCT. NUMBER: [id number]  DATE OF SERVICE: 20  START TIME: 11:00 AM  END TIME: 11:50 AM  FACILITATOR: Jasmin Ayoub LICSW  TOPIC:  EBP Group: Enhanced Mindfulness  Adult Partial Hospitalization Program  TRACK: 2    NUMBER OF PARTICIPANTS: 5    Summary of Group / Topics Discussed:  Enhanced Mindfulness: Body and Mind Integration: Patients received an overview and education regarding the importance of including the body in the management of emotional health and self-care and as a direct route to mindfulness practice.  Patients discussed various ways in which the body can serve as an informant to their physical and emotional experiences/need. Patients discussed the body as a direct link to the present moment and to mindfulness practice.  Patients discussed current relationship with body, self-awareness, mindfulness practice and barriers to connection with body.  Patients were guided through breath work and movement to facilitate greater self-awareness, grounding, self-expression, and connection to other.  Patients discussed how the experiential could be applied to better manage mental health and develop turcios connection to self.    Patient Session Goals / Objectives:  Identify how movement awareness could be used for grounding, stress management, self-expression, connection to other and self-regulation  Improved awareness of breath and movement preferences  Identify how movement and the body is used in mindfulness practice  Reflect on use of these practices in everyday life.  Identify barriers to attending to body  Telemedicine Visit: The patient's condition can be safely assessed and treated via synchronous audio and visual telemedicine encounter.      Reason for Telemedicine Visit: Services only offered telehealth and covid19    Originating Site (Patient Location): Patient's home    Distant Site (Provider  Location): Provider Remote Setting    Consent:  The patient/guardian has verbally consented to: the potential risks and benefits of telemedicine (video visit) versus in person care; bill my insurance or make self-payment for services provided; and responsibility for payment of non-covered services.     Mode of Communication:  Video Conference via [employer]    As the provider I attest to compliance with applicable laws and regulations related to telemedicine.       Patient Participation / Response:  {OP  PROGRESS NOTE PATIENT PARTICIPATION:165078}    {OP  PROGRESS NOTE PATIENT RESPONSE- ENHANCED MINDFULNESS:163545}    Treatment Plan:  Patient has {OP  PROGRAMMATIC TX PLAN STATUS:679560}    MAIKOL VargasSW

## 2020-08-26 NOTE — TELEPHONE ENCOUNTER
Provided assistance to patient in joining group.     Amie Guevara, [facility], [facility]  8/26/2020

## 2020-08-26 NOTE — GROUP NOTE
Psychoeducation Group Note    PATIENT'S NAME: Cristel Grissom  MRN:   [id number]  :   2000  ACCT. NUMBER: [id number]  DATE OF SERVICE: 20  START TIME: 10:00 AM  END TIME: 10:50 AM  FACILITATOR: Italia Anthony RN  TOPIC: DORA RN Group: Specialty Health  Adult Partial Hospitalization Program  TRACK: 2    NUMBER OF PARTICIPANTS: 6    Summary of Group / Topics Discussed:  Specialty Health:  Specialty Health: self compassion    Patient Session Goals / Objectives:  ? Pts will define self compassion with 3 core concepts  ? Pts will view Ernie talk video -Kayla Raymondf Self Compassion  ? Pts will complete a practice exercise in self compassion      Telemedicine Visit: The patient's condition can be safely assessed and treated via synchronous audio and visual telemedicine encounter.      Reason for Telemedicine Visit: Services only offered telehealth    Originating Site (Patient Location): Patient's home    Distant Site (Provider Location): Provider Remote Setting    Consent:  The patient/guardian has verbally consented to: the potential risks and benefits of telemedicine (video visit) versus in person care; bill my insurance or make self-payment for services provided; and responsibility for payment of non-covered services.     Mode of Communication:  Video Conference via [employer]    As the provider I attest to compliance with applicable laws and regulations related to telemedicine.         Patient Participation / Response:  Moderately participated, sharing some personal reflections / insights and adequately adequately received / provided feedback with other participants.    Demonstrated understanding of topics discussed through group discussion and participation    Treatment Plan:  Patient has a current master individualized treatment plan.  See Epic treatment plan for more information.    Italia Anthony RN

## 2020-08-27 ENCOUNTER — HOSPITAL ENCOUNTER (OUTPATIENT)
Dept: BEHAVIORAL HEALTH | Facility: CLINIC | Age: 20
End: 2020-08-27
Attending: PSYCHIATRY & NEUROLOGY
Payer: COMMERCIAL

## 2020-08-27 PROCEDURE — H0035 MH PARTIAL HOSP TX UNDER 24H: HCPCS | Mod: 95

## 2020-08-27 PROCEDURE — H0035 MH PARTIAL HOSP TX UNDER 24H: HCPCS | Mod: GT | Performed by: PSYCHOLOGIST

## 2020-08-27 PROCEDURE — H0035 MH PARTIAL HOSP TX UNDER 24H: HCPCS | Mod: 95 | Performed by: COUNSELOR

## 2020-08-27 ASSESSMENT — ANXIETY QUESTIONNAIRES: GAD7 TOTAL SCORE: 13

## 2020-08-27 NOTE — GROUP NOTE
Process Group Note    PATIENT'S NAME: Cristel Grissom  MRN:   [id number]  :   2000  ACCT. NUMBER: [id number]  DATE OF SERVICE: 20  START TIME:  9:00 AM  END TIME:  9:50 AM  FACILITATOR: Arnia Singh LPCC  TOPIC:  Process Group    Diagnoses:  Major depressive disorder, recurrent.  Generalized anxiety disorder.  Posttraumatic stress disorder.     Adult Partial Hospitalization Program  TRACK: 2    NUMBER OF PARTICIPANTS: 4    Telemedicine Visit: The patient's condition can be safely assessed and treated via synchronous audio and visual telemedicine encounter.      Reason for Telemedicine Visit: Services only offered telehealth    Originating Site (Patient Location): Patient's home    Distant Site (Provider Location): Provider Remote Setting    Consent:  The patient/guardian has verbally consented to: the potential risks and benefits of telemedicine (video visit) versus in person care; bill my insurance or make self-payment for services provided; and responsibility for payment of non-covered services.     Mode of Communication:  Video Conference via [employer]    As the provider I attest to compliance with applicable laws and regulations related to telemedicine.        Data:    Session content: At the start of this group, patients were invited to check in by identifying themselves, describing their current emotional status, and identifying issues to address in this group.   Area(s) of treatment focus addressed in this session included Symptom Management and Personal Safety.  She reported feeling pretty happy and motivated, and less anxious. Her goal is to workout. She denied safety concerns and suicidal ideation. Patient noted being medication compliant. She was open to feedback and support about sharing and opening up in group. Patient shared her goodbye with a group member that is discharging today.    Therapeutic Interventions/Treatment Strategies:  Psychotherapist offered support, feedback and  validation. Interventions include Emotions Management:  Reinforced the purpose and biological basis of emotions.    Assessment:    Patient response:   Patient responded to session by accepting feedback, giving feedback, listening and being attentive    Possible barriers to participation / learning include: and no barriers identified    Health Issues:   None reported       Substance Use Review:   Substance Use: No active concerns identified.    Mental Status/Behavioral Observations  Appearance:   Appropriate   Eye Contact:   Fair   Psychomotor Behavior: Restless   Attitude:   Cooperative  Interested Friendly  Orientation:   All  Speech   Rate / Production: Normal/ Responsive Normal    Volume:  Normal   Mood:    Anxious  Depressed   Affect:    Appropriate   Thought Content:   Rumination  Thought Form:  Coherent  Logical     Insight:    Fair     Plan:     Safety Plan: No current safety concerns identified.  Recommended that patient call 911 or go to the local ED should there be a change in any of these risk factors.     Barriers to treatment: None identified    Patient Contracts (see media tab):  None    Substance Use: Not addressed in session     Continue or Discharge: Patient will continue in Adult Partial Hospitalization Program (PHP)  as planned. Patient is likely to benefit from learning and using skills as they work toward the goals identified in their treatment plan.      Arina Singh, SABI  August 27, 2020

## 2020-08-27 NOTE — GROUP NOTE
Psychotherapy Group Note    PATIENT'S NAME: Cristel Grissom  MRN:   [id number]  :   2000  ACCT. NUMBER: [id number]  DATE OF SERVICE: 20  START TIME: 11:00 AM  END TIME: 11:50 AM  FACILITATOR: Humera Anna PsyD, LP  TOPIC:  EBP Group: Emotions Management  Telemedicine Visit: The patient's condition can be safely assessed and treated via synchronous audio and visual telemedicine encounter.      Reason for Telemedicine Visit: Services only offered telehealth    Originating Site (Patient Location): Patient's home    Distant Site (Provider Location): Provider Remote Setting    Consent:  The patient/guardian has verbally consented to: the potential risks and benefits of telemedicine (video visit) versus in person care; bill my insurance or make self-payment for services provided; and responsibility for payment of non-covered services.     Mode of Communication:  Video Conference via [employer]    As the provider I attest to compliance with applicable laws and regulations related to telemedicine.     Adult Partial Hospitalization Program  TRACK: 2    NUMBER OF PARTICIPANTS: 5    Summary of Group / Topics Discussed:  Emotions Management: Guilt and Shame: Patients explored and shared personal experiences associated with feelings of guilt and shame.  Group explored how these feelings develop, what they mean to each individual, and how to increase acceptance and usefulness of these feelings.  Group members assisted one another to contextualize these concepts and promote healing.     Patient Session Goals / Objectives:    Discuss and review definitions and personal views/experiences with guilt and shame    Understand the differences between guilt and shame    Explore how feelings of guilt and shame impact functioning    Understand and practice strategies to manage difficult emotions and move towards healing    Understand and normalize difficult emotions through group discussion    Understand the  utility of guilt and shame    Target  unwanted  emotions for change      Patient Participation / Response:  Moderately participated, sharing some personal reflections / insights and adequately adequately received / provided feedback with other participants.    Demonstrated understanding of topics discussed through group discussion and participation and Self-aware of experiences with difficult emotions, and strategies to employ to manage them    Treatment Plan:  Patient has an initial individualized treatment plan that was created as part of their diagnostic assessment / admission process.  A master individualized treatment plan is in the process of being developed with the patient and multi-disciplinary care team.    Humera Anna PsyD, LP   8/27/20
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
## 2020-08-27 NOTE — GROUP NOTE
Psychotherapy Group Note    PATIENT'S NAME: Cristel Grissom  MRN:   [id number]  :   2000  ACCT. NUMBER: [id number]  DATE OF SERVICE: 20  START TIME:  1:00 PM  END TIME:  1:50 PM  FACILITATOR: Humera Anna PsyD, LP  TOPIC:  EBP Group: Emotions Management  Adult Partial Hospitalization Program  TRACK: 2    NUMBER OF PARTICIPANTS: 4    Summary of Group / Topics Discussed:  Emotions Management: Emotions and the Brain: Patients discussed the purpose of the emotions including the biological basis of emotion, with an emphasis on how biology underlies our experience of emotion.  Also reviewed the impact past experiences, sensory input, heredity, culture and other factors have on the development of our brain/emotional development.   Reviewed the biological basis of emotion, with an emphasis on how biology underlies our experience of emotion.    Patient Session Goals / Objectives:    Explore what patients know about the brain and how it relates to emotions.    Apply understanding of content to their own experiences of emotions.    Demonstrate awareness of how their body reacts to stress through group discussion/participation.    Discuss patient experiences related to mind/body connection; physical and emotional responses to stress.        Patient Participation / Response:  Fully participated with the group by sharing personal reflections / insights and openly received / provided feedback with other participants.    Demonstrated understanding of topics discussed through group discussion and participation and Expressed understanding of the relevance / importance of emotions management skills at distressing times in life    Treatment Plan:  Patient has a current master individualized treatment plan.  See Epic treatment plan for more information.    Humera Anna PsyD, LP   20

## 2020-08-27 NOTE — GROUP NOTE
Psychoeducation Group Note  Telemedicine Visit: The patient's condition can be safely assessed and treated via synchronous audio and visual telemedicine encounter.    Reason for Telemedicine Visit: Patient has requested telehealth visit  Originating Site (Patient Location): Patient's home  Distant Site (Provider Location): [facility] Outpatient Setting: Adult Day Tx  Consent:  The patient/guardian has verbally consented to: the potential risks and benefits of telemedicine (video visit) versus in person care; bill my insurance or make self-payment for services provided; and responsibility for payment of non-covered services.   Mode of Communication:  Video Conference via [employer]  As the provider I attest to compliance with applicable laws and regulations related to telemedicine.    PATIENT'S NAME: Cristel Grissom  MRN:   [id number]  :   2000  ACCT. NUMBER: [id number]  DATE OF SERVICE: 20  START TIME:  2:00 PM  END TIME:  2:50 PM  FACILITATOR: Antonieta Ku PsyD  TOPIC: MH Life Skills Group: Lifestyle Balance and Structure  Adult Partial Hospitalization Program  TRACK: 2    NUMBER OF PARTICIPANTS: 4    Summary of Group / Topics Discussed:  Lifestyle Balance and Strucure:  Benefits of Leisure on Mental Health: Patients explored and learned about the benefits and possibilities of leisure activity to create lifestyle balance that supports their mental and physical wellbeing.  Patients were assisted to identify individualized leisure values and interests, recognized the benefits of leisure activity on mental health, and problem solved barriers to leisure engagement and strategies to overcome.  Patient engaged in an experiential leisure activity to gain self-awareness and build milieu social aspects and reflected on the impact the experiential activity had on their mood.       Patient Session Goals / Objectives:    Increased awareness of the importance of engagement in leisure activities to support  lifestyle balance and perceived quality of life    Identified strategies to recognize and challenge barriers to leisure participation     Facilitated exploration of meaningful leisure interests and values    Practiced and reflected on how to generalize taught skills to their everyday life        Patient Participation / Response:  Fully participated with the group by sharing personal reflections / insights and openly received / provided feedback with other participants.    Patient would benefit from additional opportunities to practice the content to be able to generalize it to their everyday life with increased intentionality, consistency, and efficacy in support of their psychiatric recovery    Treatment Plan:  Patient has a current master individualized treatment plan.  See Epic treatment plan for more information.    Kayla Palmer Psy., OLU,  Licensed Psychologist

## 2020-08-27 NOTE — GROUP NOTE
Psychotherapy Group Note    PATIENT'S NAME: Cristel Grissom  MRN:   [id number]  :   2000  ACCT. NUMBER: [id number]  DATE OF SERVICE: 20  START TIME: 10:00 AM  END TIME: 10:50 AM  FACILITATOR: Arina Singh LPCC  TOPIC:  EBP Group: Emotions Management  Adult Partial Hospitalization Program  TRACK: 2    NUMBER OF PARTICIPANTS: 4    Telemedicine Visit: The patient's condition can be safely assessed and treated via synchronous audio and visual telemedicine encounter.      Reason for Telemedicine Visit: Services only offered telehealth    Originating Site (Patient Location): Patient's home    Distant Site (Provider Location): Provider Remote Setting    Consent:  The patient/guardian has verbally consented to: the potential risks and benefits of telemedicine (video visit) versus in person care; bill my insurance or make self-payment for services provided; and responsibility for payment of non-covered services.     Mode of Communication:  Video Conference via [employer]    As the provider I attest to compliance with applicable laws and regulations related to telemedicine.      Summary of Group / Topics Discussed:  Emotions Management: Model of Emotions: Patients were introduced to the cyclical model of emotions.  Explored emotions are shaped by different events and one s interpretation of events.  Group discussed how emotions begin with an event, followed by one s interpretation, followed by associated feelings.  Discussion included a review of personal urges and actions that can/do follow an emotional experience in the patient s life, and the end results.    Patient Session Goals / Objectives:    Demonstrate understanding of types various emotions.    Identify and discuss specific emotions and when they occur; understand triggers.    Identify individual emotions and physical sensations that accompany them.    Discuss urges and actions, and how to influence the intensity of emotional reactions and disrupt  the cycle.      Discuss barriers to emotional regulation.    Choose 1-2 strategies to assist with emotional response to potentially distressing situations.      Patient Participation / Response:  Fully participated with the group by sharing personal reflections / insights and openly received / provided feedback with other participants.    Demonstrated understanding of topics discussed through group discussion and participation, Expressed understanding of the relevance / importance of emotions management skills at distressing times in life and Self-aware of experiences with difficult emotions, and strategies to employ to manage them    Treatment Plan:  Patient has an initial individualized treatment plan that was created as part of their diagnostic assessment / admission process.  A master individualized treatment plan is in the process of being developed with the patient and multi-disciplinary care team.    Arina Singh, [facility]

## 2020-08-28 ENCOUNTER — HOSPITAL ENCOUNTER (OUTPATIENT)
Dept: BEHAVIORAL HEALTH | Facility: CLINIC | Age: 20
End: 2020-08-28
Attending: PSYCHIATRY & NEUROLOGY
Payer: COMMERCIAL

## 2020-08-28 DIAGNOSIS — F33.2 SEVERE EPISODE OF RECURRENT MAJOR DEPRESSIVE DISORDER, WITHOUT PSYCHOTIC FEATURES (H): Primary | ICD-10-CM

## 2020-08-28 PROCEDURE — H0035 MH PARTIAL HOSP TX UNDER 24H: HCPCS | Mod: GT | Performed by: OCCUPATIONAL THERAPIST

## 2020-08-28 PROCEDURE — H0035 MH PARTIAL HOSP TX UNDER 24H: HCPCS | Mod: GT | Performed by: MARRIAGE & FAMILY THERAPIST

## 2020-08-28 PROCEDURE — H0035 MH PARTIAL HOSP TX UNDER 24H: HCPCS | Mod: 95 | Performed by: PSYCHOLOGIST

## 2020-08-28 PROCEDURE — H0035 MH PARTIAL HOSP TX UNDER 24H: HCPCS | Mod: 95 | Performed by: SOCIAL WORKER

## 2020-08-28 RX ORDER — HYDROXYZINE HYDROCHLORIDE 25 MG/1
TABLET, FILM COATED ORAL
Qty: 240 TABLET | Refills: 0 | Status: SHIPPED | OUTPATIENT
Start: 2020-08-28 | End: 2021-03-10

## 2020-08-28 NOTE — GROUP NOTE
Process Group Note    PATIENT'S NAME: Cristel Grissom  MRN:   [id number]  :   2000  ACCT. NUMBER: [id number]  DATE OF SERVICE: 20  START TIME: 11:00 AM  END TIME: 11:50 AM  FACILITATOR: Dudley You, PhD LP  TOPIC:  Process Group    Diagnoses:  Major depressive disorder, recurrent.  Generalized anxiety disorder.  Posttraumatic stress disorder.       Adult Partial Hospitalization Program  TRACK: 2    Telemedicine Visit: The patient's condition can be safely assessed and treated via synchronous audio and visual telemedicine encounter.      Reason for Telemedicine Visit:  COVID19    Originating Site (Patient Location): Patient's home    Distant Site (Provider Location): Provider Remote Setting    Consent:  The patient/guardian has verbally consented to: the potential risks and benefits of telemedicine (video visit) versus in person care; bill my insurance or make self-payment for services provided; and responsibility for payment of non-covered services.     Mode of Communication:  Video Conference via [email or web address]    As the provider I attest to compliance with applicable laws and regulations related to telemedicine.      NUMBER OF PARTICIPANTS: 4          Data:    Session content: At the start of this group, patients were invited to check in by identifying themselves, describing their current emotional status, and identifying issues to address in this group.   Area(s) of treatment focus addressed in this session included Symptom Management, Personal Safety and Develop Socialization / Interpersonal Relationship Skills.    The patient was mostly quiet during the group, solely stating that she misses a lot her routine that hs been stripped away due to COVID.     Therapeutic Interventions/Treatment Strategies:  Psychotherapist offered support, feedback and validation, set limits, provided redirection and reinforced use of skills.    Assessment:    Patient response:   Patient responded to session by  listening, focusing on goals and being attentive    Possible barriers to participation / learning include: and no barriers identified    Health Issues:   None reported       Substance Use Review:   Substance Use: No active concerns identified.    Mental Status/Behavioral Observations  Appearance:   Appropriate   Eye Contact:   Good   Psychomotor Behavior: Normal   Attitude:   Cooperative   Orientation:   All  Speech   Rate / Production: Monotone    Volume:  Soft   Mood:    Anxious  Depressed  Sad   Affect:    Constricted   Thought Content:   Rumination  Thought Form:  Coherent  Logical     Insight:    Fair  and Intellectual Insight    Plan:     Safety Plan: No current safety concerns identified.  Recommended that patient call 911 or go to the local ED should there be a change in any of these risk factors.     Barriers to treatment: None identified    Patient Contracts (see media tab):  None    Substance Use: Not addressed in session     Continue or Discharge: Patient will continue in Adult Partial Hospitalization Program (PHP)  as planned. Patient is likely to benefit from learning and using skills as they work toward the goals identified in their treatment plan.      Dudley You, PhD LP  August 28, 2020

## 2020-08-28 NOTE — GROUP NOTE
Psychotherapy Group Note    PATIENT'S NAME: Cristel Grissom  MRN:   [id number]  :   2000  ACCT. NUMBER: [id number]  DATE OF SERVICE: 20  START TIME:  2:00 PM  END TIME:  2:30 PM  FACILITATOR: González Martins LMFT  TOPIC:  EBP Group: Coping Skills  Adult Partial Hospitalization Program  TRACK: 2    NUMBER OF PARTICIPANTS: 1    Telemedicine Visit: The patient's condition can be safely assessed and treated via synchronous audio and visual telemedicine encounter.      Reason for Telemedicine Visit: Services only offered telehealth    Originating Site (Patient Location): Patient's home    Distant Site (Provider Location): Provider Remote Setting    Consent:  The patient/guardian has verbally consented to: the potential risks and benefits of telemedicine (video visit) versus in person care; bill my insurance or make self-payment for services provided; and responsibility for payment of non-covered services.     Mode of Communication:  Video Conference via [employer]    As the provider I attest to compliance with applicable laws and regulations related to telemedicine.   Summary of Group / Topics Discussed:  Coping Skills: Calming Strategies: Patients discussed and practiced strategies to increase sense of calm in the body.  Reviewed the benefits of calming strategies as well as past / current practices of each member.  Patients identified situations in which using these strategies would reduce stress. They developed the ability to distinguish when these strategies can be useful in their lives for management and stress and psychological well-being.    Patient Session Goals / Objectives:    Understand the purpose of using calming strategies to reduce stress    Review patients current calming practices and discuss a more formal way of practicing and accessing skills.    Increase ability to decide when to use various calming strategies (e.g. Progressive muscle relaxation, deep breathing, guided imagery, +  thoughts).    Choose 1-2 calming strategies to apply during times of distress.        Patient Participation / Response:  Fully participated with the group by sharing personal reflections / insights and openly received / provided feedback with other participants.    Demonstrated understanding of topics discussed through group discussion and participation and Practiced 2-3 new coping skills in session    Treatment Plan:  Patient has a current master individualized treatment plan.  See Epic treatment plan for more information.    Farooq Martins, DALILAFT

## 2020-08-28 NOTE — GROUP NOTE
Psychoeducation Group Note    PATIENT'S NAME: Cristel Grissom  MRN:   [id number]  :   2000  ACCT. NUMBER: [id number]  DATE OF SERVICE: 20  START TIME:  2:30 PM  END TIME:  2:50 PM; unable to bill  FACILITATOR: Evelyn Hawkins RN  TOPIC: DORA RN Group: Health Maintenance  Telemedicine Visit: The patient's condition can be safely assessed and treated via synchronous audio and visual telemedicine encounter.      Reason for Telemedicine Visit:  covid19    Originating Site (Patient Location): Patient's home    Distant Site (Provider Location): Provider Remote Setting    Consent:  The patient/guardian has verbally consented to: the potential risks and benefits of telemedicine (video visit) versus in person care; bill my insurance or make self-payment for services provided; and responsibility for payment of non-covered services.     Mode of Communication:  Video Conference via [employer]    As the provider I attest to compliance with applicable laws and regulations related to telemedicine.      Adult Partial Hospitalization Program  TRACK: 2    NUMBER OF PARTICIPANTS: 3    Summary of Group / Topics Discussed:  Health Maintenance: Weekend planning: Patients were given time to complete a weekend plan of what they will do to promote wellness and sobriety over the weekend when they do not have the structure of group. Patients were encouraged to review progress on their treatment goals and were challenged to identify ways to work toward meeting them. Patients identified and discussed foreseeable barriers to success over the weekend and then developed a plan to overcome them. Patients reviewed their distress coping skills and social support network and discussed this with the group.       Patient Session Goals / Objectives:    ?    Identified activities to engage in that promote balance in wellness  ?    Distinguished possible barriers to success over the weekend and created a plan to overcome them  ?    Listed  distress coping skills and identified social support network to utilize if in crisis during the weekend          Patient Participation / Response:  Fully participated with the group by sharing personal reflections / insights and openly received / provided feedback with other participants.    Demonstrated understanding of topics discussed through group discussion and participation and Identified / Expressed personal readiness to practice skills    Treatment Plan:  Patient has a current master individualized treatment plan.  See Epic treatment plan for more information.    Evelyn Hawkins RN

## 2020-08-28 NOTE — PROGRESS NOTES
"[facility] Psychiatric Progress Note  2020      Patient:  Cristel Grissom   Medical Record Number:  [id number]  :  2000          Interim History:   The patient's care was discussed with the treatment team and chart notes were reviewed.  Pt has been \"ok, I guess.\"  Getting used to the program.      Psychiatric ROS:  Mood: not depressed;  she is anxious/moderately  Sleep:normal, 8 to 9 hours  Appetite:has hunger but things don't look good  Eating:  Eating less  Energy Level:sometimes a lot;  Sometimes a little bit  Concentration/Memory Problems:  YES  Suicidal Thoughts:No  Homicidal Thoughts:No  Psychotic Symptoms: No  Medication Side Effects:Yes eyes and mouth are dry;  Constipation;   Eyes hurt;    Medication Compliance:Yes   Physical Complaints:negative         Medications:     PAST PSYCH MEDS:  Zoloft, Paxil, Abilify, Seroquel, propranolol, Cogentin, Vistaril, Neurontin, melatonin    Current Outpatient Medications   Medication Sig     1.  Cogentin 1 mg twice daily.   2.  Neurontin 200 mg 3 times daily as needed for anxiety.   3.  Hydroxyzine 50 mg 4 times daily.   4.  Melatonin 3 mg at bedtime as needed.   5.  Propranolol 10 mg 3 times daily.   6.  Seroquel 200 mg at bedtime.   7.  Seroquel 50 four times daily as needed for anxiety.   8.  MiraLax 17 grams daily as needed.       No current facility-administered medications for this encounter.              Allergies:     Allergies   Allergen Reactions     Bactrim [Sulfamethoxazole W/Trimethoprim]      When very young, vomiting likely per mom     Cefzil [Cefprozil]      When very young, vomiting likely per mom     Penicillins      When very young, vomiting likely per mom     Sulfa Drugs      When very young, vomiting likely per mom            Psychiatric Examination:   There were no vitals taken for this visit.  Weight is 0 lbs 0 oz  There is no height or weight on file to calculate BMI.    Appearance:  " awake, alert and adequately groomed  Attitude:  cooperative  Eye Contact:  good  Mood:  anxious  Affect:  mood congruent  Speech:  clear, coherent  Psychomotor Behavior:  no evidence of tardive dyskinesia, dystonia, or tics  Throught Process:  logical  Associations:  no loose associations  Thought Content:  no evidence of suicidal ideation or homicidal ideation and no evidence of psychotic thought  Insight:  good  Judgement:  intact  Oriented to:  time, person, and place  Attention Span and Concentration:  intact  Recent and Remote Memory:  intact  Gait:Normal    Risk/Potential for Dangerousness:  Multiple Active Diagnoses:HIGH  Self Care:HIGH  Suicide:LOW  Assault:LOW  Self Injurious Behaviors:LOW  Inappropriate Sexual Behavior:LOW         Labs:   No results found for this or any previous visit (from the past 24 hour(s)).     Recent Results (from the past 1008 hour(s))   Urine Drugs of Abuse Screen Panel 13    Collection Time: 08/02/20  2:17 AM   Result Value Ref Range    Cannabinoids (57-hqt-7-carboxy-9-THC) Not Detected NDET^Not Detected ng/mL    Phencyclidine (Phencyclidine) Not Detected NDET^Not Detected ng/mL    Cocaine (Benzoylecgonine) Not Detected NDET^Not Detected ng/mL    Methamphetamine (d-Methamphetamine) Not Detected NDET^Not Detected ng/mL    Opiates (Morphine) Not Detected NDET^Not Detected ng/mL    Amphetamine (d-Amphetamine) Not Detected NDET^Not Detected ng/mL    Benzodiazepines (Nordiazepam) Not Detected NDET^Not Detected ng/mL    Tricyclic Antidepressants (Desipramine) Not Detected NDET^Not Detected ng/mL    Methadone (Methadone) Not Detected NDET^Not Detected ng/mL    Barbiturates (Butalbital) Not Detected NDET^Not Detected ng/mL    Oxycodone (Oxycodone) Not Detected NDET^Not Detected ng/mL    Propoxyphene (Norpropoxyphene) Not Detected NDET^Not Detected ng/mL    Buprenorphine (Buprenorphine) Not Detected NDET^Not Detected ng/mL   HCG qualitative urine (UPT)    Collection Time: 08/02/20  2:17  AM   Result Value Ref Range    HCG Qual Urine Negative NEG^Negative   Symptomatic COVID-19 Virus (Coronavirus) by PCR    Collection Time: 08/02/20  6:30 AM    Specimen: Nasopharyngeal   Result Value Ref Range    COVID-19 Virus PCR to [facility] - Source Nares     COVID-19 Virus PCR to [facility] - Result       Test received-See reflex to IDDL test SARS CoV2 (COVID-19) Virus RT-PCR   SARS-CoV-2 COVID-19 Virus (Coronavirus) RT-PCR Nasopharyngeal    Collection Time: 08/02/20  6:30 AM    Specimen: Nasopharyngeal   Result Value Ref Range    SARS-CoV-2 Virus Specimen Source Nares     SARS-CoV-2 PCR Result NEGATIVE     SARS-CoV-2 PCR Comment       Testing was performed using the [employer] Xpress SARS-CoV-2 Assay on the Cepheid Gene-Xpert   Instrument Systems. Additional information about this Emergency Use Authorization (EUA)   assay can be found via the Lab Guide.           Impression:   This is a 20 year old female continues PHP for mood stabilization.  She's having a lot of anticholinergic SE from meds.          DIagnoses:     Axis I:  Major depressive disorder, recurrent.  Generalized anxiety disorder.  Posttraumatic stress disorder.   Axis II:  Deferred.   Axis III:  No diagnosis.          Plan:     Continue [facility].   Continue current medications, which were just started at [facility].   Decrease Cogentin to 0.5mg bid x 2 days then stop.   Change Vistaril to 25 to 50mg po qid prn anxiety.    Expect stabilization and completion of Partial Hospital Program.     Emmanuel Thomas MD

## 2020-08-28 NOTE — GROUP NOTE
Psychoeducation Group Note    PATIENT'S NAME: Cristel Grissom  MRN:   [id number]  :   2000  ACCT. NUMBER: [id number]  DATE OF SERVICE: 20  START TIME: 10:00 AM  END TIME: 10:50 AM  FACILITATOR: Katya Hernandez OTR/L  TOPIC: MH PHP OT Group: Self- Regulation Skills  Adult Partial Hospitalization Program  TRACK: 2    NUMBER OF PARTICIPANTS: 4      Telemedicine Visit: The patient's condition can be safely assessed and treated via synchronous audio and visual telemedicine encounter.      Reason for Telemedicine Visit: Services only offered telehealth    Originating Site (Patient Location): Patient's home    Distant Site (Provider Location): [facility] Outpatient Setting: [facility]    Consent:  The patient/guardian has verbally consented to: the potential risks and benefits of telemedicine (video visit) versus in person care; bill my insurance or make self-payment for services provided; and responsibility for payment of non-covered services.     Mode of Communication:  Video Conference via [employer]    As the provider I attest to compliance with applicable laws and regulations related to telemedicine.      Summary of Group / Topics Discussed:  Self-Regulation Skills: Sensory Grounding Skills:  Patients actively participated in a psychoeducational discussion focused on identifying and utilizing skills for grounding when experiencing dissociation, flashbacks, panic attacks, ruminations, and/or suicidal thoughts.  Patients also participated in a discussion focused on identifying skills that can be used for preventative purposes.  Patient's explored body based skills (bottom up processing skills) and techniques to help manage symptoms and ground themselves so they can be in control and comfortable and able to function in different environments.         Patient Session Goals / Objectives:    Kaufman how the ANS works and importance of its  impact on functioning and mental wellbeing    Kaufman how  developing interoceptive awareness can help one self-regulate sooner rather than later    Identified signs and symptoms when grounding skills could be helpful     Identified specific and individualized neurosensory skills to help when distressed and for crisis management    Established a plan for practice of these skills in their own environments             Patient Participation / Response:  Moderately participated, sharing some personal reflections / insights and adequately adequately received / provided feedback with other participants.    Patient presentation: Cristel arrived about 15-20 minutes late to group; active in sharing examples and giving feedback; affect was congruent, Verbalized understanding of content and Patient would benefit from additional opportunities to practice the content to be able to generalize it to their everyday life with increased intentionality, consistency, and efficacy in support of their psychiatric recovery    Treatment Plan:  Patient has a current master individualized treatment plan.  See Epic treatment plan for more information.    Katya Hernandez, OTR/L

## 2020-08-28 NOTE — GROUP NOTE
Telemedicine Visit: The patient's condition can be safely assessed and treated via synchronous audio and visual telemedicine encounter.      Reason for Telemedicine Visit: Services only offered telehealth    Originating Site (Patient Location): Patient's home    Distant Site (Provider Location): [facility]: [place]    Consent:  The patient/guardian has verbally consented to: the potential risks and benefits of telemedicine (video visit) versus in person care; bill my insurance or make self-payment for services provided; and responsibility for payment of non-covered services.     Mode of Communication:  Video Conference via [employer]    As the provider I attest to compliance with applicable laws and regulations related to telemedicine.    Psychotherapy Group Note    PATIENT'S NAME: Cristel Grissom  MRN:   [id number]  :   2000  ACCT. NUMBER: [id number]  DATE OF SERVICE: 20  START TIME:  9:00 AM  END TIME:  9:50 AM  FACILITATOR: Magda Rios LICSW  TOPIC:  EBP Group: Self-Awareness  Adult Partial Hospitalization Program  TRACK: Track 2    NUMBER OF PARTICIPANTS: 4    Summary of Group / Topics Discussed:  Self-Awareness: Personal Strengths: Topic focused on assisting patients in identifying personal strengths and how they relate to the management of mental health symptoms. Patients discussed the benefits of acknowledging their personal strengths and their impact on mood improvement, mindfulness, and perspective. Patients worked to increase time spent on recognition and appreciation of what is positive and working in their lives. The goal is to reduce rumination and negative thinking resulting in increased mindfulness and resilience. Patients will work to put skills into practice and problem-solve barriers.     Patient Session Goals / Objectives:    Identified personal strengths    Identified barriers to recognition of personal strengths    Verbalized understanding of  strategies to increase use of their strengths in management of daily symptoms    Created a personal affirmation statement with strengths identified.      Patient Participation / Response:  Fully participated with the group by sharing personal reflections / insights and openly received / provided feedback with other participants.    Demonstrated understanding of topics discussed through group discussion and participation and Demonstrated understanding of values, strengths, and challenges to learn about themselves    Treatment Plan:  Patient has an initial individualized treatment plan that was created as part of their diagnostic assessment / admission process.  A master individualized treatment plan is in the process of being developed with the patient and multi-disciplinary care team.    Magda Rios, [facility] In[facility] HospitalSW

## 2020-08-28 NOTE — PROGRESS NOTES
"RN Review of Medical History / Physical Health Screen  Outpatient Mental Health Programs - Adult    Adult Partial Hospitalization Program    PATIENT'S NAME: Cristel Grissom  MRN:   [id number]  :   2000  ACCT. NUMBER: [id number]  CURRENT AGE:  20 year old    DATE OF DIAGNOSTIC ASSESSMENT: 20  DATE OF ADMISSION: 20     Please see Diagnostic Assessment for additional Medical History.     General Health:   Have you had any exposure to any communicable disease in the past 2-3 weeks? no     Are you aware of safe sex practices? yes       Nutrition:    Are you on a special diet? If yes, please explain:  no   Do you have any concerns regarding your nutritional status? If yes, please explain:  no   Have you had any appetite changes in the last 3 months?  Yes, decreased     Have you had any weight loss or weight gain in the last 3 months?  No     Do you have a history of an eating disorder? no   Do you have a history of being in an eating disorder program? no     Patient height and weight recorded by RN in epic flowsheet: no    No; Unable to measure  Because of temporary in-person programmatic suspension due to COVID-19 pandemic, all pt weights and heights will be collected through patient self-report an recorded in physical health screening progress note upon admission to the program.                            Height/Weight Review:  Patient reported height:     5'4\"   Patient reports weight:  Date last checked:  130lb   Any referrals/needs identified?  no        BMI Review:  Was the patient informed of BMI? no      Findings  No Intervention         Fall Risk:   Have you had any falls in the past 3 months? no     Do you currently use any assistive devices for mobility?   no      Additional Comments/Assessment: pt states she had dry eyes, did not wear contacts while in hospital, and now that she is out, she has some off and on eye discomfort. She is wearing contacts again, and does not know if this is a " correlation.  I explained it could be a possible side effect of meds and encouraged her to discuss with her doctor.    Per completion of the Medical History / Physical Health Screen, is there a recommendation to see / follow up with a primary care physician/clinic or dentist?    encouraged pt to record eye concerns, new meds and review with MD Italia Anthony, RN  8/28/2020

## 2020-08-31 ENCOUNTER — HOSPITAL ENCOUNTER (OUTPATIENT)
Dept: BEHAVIORAL HEALTH | Facility: CLINIC | Age: 20
End: 2020-08-31
Attending: PSYCHIATRY & NEUROLOGY
Payer: COMMERCIAL

## 2020-08-31 PROCEDURE — H0035 MH PARTIAL HOSP TX UNDER 24H: HCPCS | Mod: GT

## 2020-08-31 PROCEDURE — H0035 MH PARTIAL HOSP TX UNDER 24H: HCPCS | Mod: 95 | Performed by: COUNSELOR

## 2020-08-31 NOTE — ADDENDUM NOTE
Encounter addended by: Evelyn Hawkins RN on: 8/31/2020 9:14 AM   Actions taken: Clinical Note Signed

## 2020-08-31 NOTE — GROUP NOTE
Psychotherapy Group Note    PATIENT'S NAME: Cristel Grissom  MRN:   [id number]  :   2000  ACCT. NUMBER: [id number]  DATE OF SERVICE: 20  START TIME:  2:00 PM  END TIME:  2:50 PM  FACILITATOR: Arina Singh LPCC  TOPIC:  EBP Group: Specialty Awareness  Adult Partial Hospitalization Program  TRACK: 2    Telemedicine Visit: The patient's condition can be safely assessed and treated via synchronous audio and visual telemedicine encounter.      Reason for Telemedicine Visit: Services only offered telehealth    Originating Site (Patient Location): Patient's home    Distant Site (Provider Location): Provider Remote Setting    Consent:  The patient/guardian has verbally consented to: the potential risks and benefits of telemedicine (video visit) versus in person care; bill my insurance or make self-payment for services provided; and responsibility for payment of non-covered services.     Mode of Communication:  Video Conference via HASH    As the provider I attest to compliance with applicable laws and regulations related to telemedicine.      NUMBER OF PARTICIPANTS: 4    Summary of Group / Topics Discussed:  Specialty Topics: Education Support Network: Patients worked on identifying the mild, moderate, and severe symptoms of their disorder.  Patients identified helpful supportive statements and actions they would appreciate from caregivers.  The goal is to gather information in preparation for the education support network for problem solving and planning for support with caregivers.    Patients worked on identifying different types of support they need and want at different levels of symptoms severity. They talked about barriers to reach out for support and worked ways to overcome them.     Patient Session Goals / Objectives:    Understanding diagnoses and accompanying physical reactions, thoughts, and behaviors.      Explored options to support patients in their recovery     Formulated a plan  with caregivers for assistance and support to aid in recovery     Problem solved barriers to follow through on plan        Patient Participation / Response:  Fully participated with the group by sharing personal reflections / insights and openly received / provided feedback with other participants.    Demonstrated understanding of topics discussed through group discussion and participation and Identified / Expressed readiness to act on skill suggestions discussed in topic    Treatment Plan:  Patient has a current master individualized treatment plan.  See Epic treatment plan for more information.    Arina Singh, STACIC

## 2020-08-31 NOTE — GROUP NOTE
Psychoeducation Group Note    PATIENT'S NAME: Cristel Grissom  MRN:   [id number]  :   2000  ACCT. NUMBER: [id number]  DATE OF SERVICE: 20  START TIME:  1:00 PM  END TIME:  1:50 PM  FACILITATOR: Italia Anthony RN  TOPIC: DORA RN Group: Medication Education and Management  Adult Partial Hospitalization Program  TRACK: 2    NUMBER OF PARTICIPANTS: 4    Summary of Group / Topics Discussed:  Medication Educations and Management:  Medication Jeopardy: Patients provided education regarding medication safety, antidepressants, side effects, neuroleptics, expected medication outcomes, knowledge of diagnosis, symptoms, and symptom management through an engaging jeopardy-style format.     Patient Session Goals / Objectives:    ? Participated in team-based [employer] game  ? Identified strategies for safe use, handling, and disposal of medications  ? Discussed basic aspects of medication safety, side effects, adverse outcomes and contraindications  Telemedicine Visit: The patient's condition can be safely assessed and treated via synchronous audio and visual telemedicine encounter.      Reason for Telemedicine Visit: Services only offered telehealth    Originating Site (Patient Location): Patient's home    Distant Site (Provider Location): Provider Remote Setting    Consent:  The patient/guardian has verbally consented to: the potential risks and benefits of telemedicine (video visit) versus in person care; bill my insurance or make self-payment for services provided; and responsibility for payment of non-covered services.     Mode of Communication:  Video Conference via [employer]    As the provider I attest to compliance with applicable laws and regulations related to telemedicine.       Patient Participation / Response:  Fully participated with the group by sharing personal reflections / insights and openly received / provided feedback with other participants.     Demonstrated understanding of topics discussed  through group discussion and participation    Treatment Plan:  Patient has a current master individualized treatment plan.  See Epic treatment plan for more information.    Italia Anthony RN

## 2020-08-31 NOTE — PROGRESS NOTES
[facility]   Dr. Thomas's Psychiatric Progress Note  2020      Patient:  Cristel Grissom   Medical Record Number:  [id number]  :  2000          Interim History:   The patient's care was discussed with the treatment team and chart notes were reviewed.  Bored this weekend.  Hung with her family.  Was outside some.      Psychiatric ROS:  Mood: almost had a panic attack once this weekend and none since;  No depression;    Sleep:   Good all through the night;   Appetite: still not there  Eating:   Forcing herself to eat more than she had been;  Energy Level:  good  Concentration/Memory:   So-so  Suicidal Thoughts:No  Homicidal Thoughts:No  Psychotic Symptoms: No  Medication Side Effects:Yes eyes and mouth are dry;  Constipation;   Eyes hurt;    Medication Compliance:Yes   Physical Complaints:negative         Medications:     PAST PSYCH MEDS:  Zoloft, Paxil, Abilify, Seroquel, propranolol, Cogentin, Vistaril, Neurontin, melatonin    Current Outpatient Medications   Medication Sig     1.  Cogentin 1 mg twice daily.   2.  Neurontin 200 mg 3 times daily as needed for anxiety.   3.  Hydroxyzine 50 mg 4 times daily.   4.  Melatonin 3 mg at bedtime as needed.   5.  Propranolol 10 mg 3 times daily.   6.  Seroquel 200 mg at bedtime.   7.  Seroquel 50 four times daily as needed for anxiety.   8.  MiraLax 17 grams daily as needed.       No current facility-administered medications for this encounter.              Allergies:     Allergies   Allergen Reactions     Bactrim [Sulfamethoxazole W/Trimethoprim]      When very young, vomiting likely per mom     Cefzil [Cefprozil]      When very young, vomiting likely per mom     Penicillins      When very young, vomiting likely per mom     Sulfa Drugs      When very young, vomiting likely per mom            Psychiatric Examination:   There were no vitals taken for this visit.  Weight is 0 lbs 0 oz  There is no height or weight on file to  calculate BMI.    Appearance:  awake, alert and adequately groomed  Attitude:  cooperative  Eye Contact:  good  Mood:  anxious  Affect:  mood congruent  Speech:  clear, coherent  Psychomotor Behavior:  no evidence of tardive dyskinesia, dystonia, or tics  Throught Process:  logical  Associations:  no loose associations  Thought Content:  no evidence of suicidal ideation or homicidal ideation and no evidence of psychotic thought  Insight:  good  Judgement:  intact  Oriented to:  time, person, and place  Attention Span and Concentration:  intact  Recent and Remote Memory:  intact  Gait:Normal    Risk/Potential for Dangerousness:  Multiple Active Diagnoses:HIGH  Self Care:HIGH  Suicide:LOW  Assault:LOW  Self Injurious Behaviors:LOW  Inappropriate Sexual Behavior:LOW         Labs:   No results found for this or any previous visit (from the past 24 hour(s)).     Recent Results (from the past 1008 hour(s))   Urine Drugs of Abuse Screen Panel 13    Collection Time: 08/02/20  2:17 AM   Result Value Ref Range    Cannabinoids (18-wqt-4-carboxy-9-THC) Not Detected NDET^Not Detected ng/mL    Phencyclidine (Phencyclidine) Not Detected NDET^Not Detected ng/mL    Cocaine (Benzoylecgonine) Not Detected NDET^Not Detected ng/mL    Methamphetamine (d-Methamphetamine) Not Detected NDET^Not Detected ng/mL    Opiates (Morphine) Not Detected NDET^Not Detected ng/mL    Amphetamine (d-Amphetamine) Not Detected NDET^Not Detected ng/mL    Benzodiazepines (Nordiazepam) Not Detected NDET^Not Detected ng/mL    Tricyclic Antidepressants (Desipramine) Not Detected NDET^Not Detected ng/mL    Methadone (Methadone) Not Detected NDET^Not Detected ng/mL    Barbiturates (Butalbital) Not Detected NDET^Not Detected ng/mL    Oxycodone (Oxycodone) Not Detected NDET^Not Detected ng/mL    Propoxyphene (Norpropoxyphene) Not Detected NDET^Not Detected ng/mL    Buprenorphine (Buprenorphine) Not Detected NDET^Not Detected ng/mL   HCG qualitative urine (UPT)     Collection Time: 08/02/20  2:17 AM   Result Value Ref Range    HCG Qual Urine Negative NEG^Negative   Symptomatic COVID-19 Virus (Coronavirus) by PCR    Collection Time: 08/02/20  6:30 AM    Specimen: Nasopharyngeal   Result Value Ref Range    COVID-19 Virus PCR to [facility] - Source Nares     COVID-19 Virus PCR to [facility] - Result       Test received-See reflex to IDDL test SARS CoV2 (COVID-19) Virus RT-PCR   SARS-CoV-2 COVID-19 Virus (Coronavirus) RT-PCR Nasopharyngeal    Collection Time: 08/02/20  6:30 AM    Specimen: Nasopharyngeal   Result Value Ref Range    SARS-CoV-2 Virus Specimen Source Nares     SARS-CoV-2 PCR Result NEGATIVE     SARS-CoV-2 PCR Comment       Testing was performed using the Supercellert Xpress SARS-CoV-2 Assay on the Cepheid Gene-Xpert   Instrument Systems. Additional information about this Emergency Use Authorization (EUA)   assay can be found via the Lab Guide.           Impression:   This is a 20 year old female continues PHP for mood stabilization.  She's having a lot of anticholinergic SE from meds.          DIagnoses:     Axis I:  Major depressive disorder, recurrent.  Generalized anxiety disorder.  Posttraumatic stress disorder.   Axis II:  Deferred.   Axis III:  No diagnosis.          Plan:     Continue [facility], [facility].   Continue current medications, which were just started at [facility].   Decreased Cogentin to 0.5mg bid and can stop now.    Change Vistaril to 25 to 50mg po qid prn anxiety.    Expect stabilization and completion of Partial Hospital Program.     Emmanuel Thomas MD

## 2020-08-31 NOTE — GROUP NOTE
"Process Group Note    PATIENT'S NAME: Cristel Grissom  MRN:   [id number]  :   2000  ACCT. NUMBER: [id number]  DATE OF SERVICE: 20  START TIME:  9:00 AM  END TIME:  9:50 AM  FACILITATOR: Arina Singh LPCC  TOPIC:  Process Group    Diagnoses:  Major depressive disorder, recurrent.  Generalized anxiety disorder.  Posttraumatic stress disorder.     Adult Partial Hospitalization Program  TRACK: 2    NUMBER OF PARTICIPANTS: 5    Telemedicine Visit: The patient's condition can be safely assessed and treated via synchronous audio and visual telemedicine encounter.      Reason for Telemedicine Visit: Services only offered telehealth    Originating Site (Patient Location): Patient's home    Distant Site (Provider Location): Provider Remote Setting    Consent:  The patient/guardian has verbally consented to: the potential risks and benefits of telemedicine (video visit) versus in person care; bill my insurance or make self-payment for services provided; and responsibility for payment of non-covered services.     Mode of Communication:  Video Conference via [employer]    As the provider I attest to compliance with applicable laws and regulations related to telemedicine.           Data:    Session content: At the start of this group, patients were invited to check in by identifying themselves, describing their current emotional status, and identifying issues to address in this group.   Area(s) of treatment focus addressed in this session included Symptom Management and Personal Safety.  She reported feeling \"in the middle.\" Her goal is to get outside and play with her dog. She denied safety concerns and suicidal ideation. She indicated being medication compliant. She provided supportive feedback and was open to support.     Therapeutic Interventions/Treatment Strategies:  Psychotherapist offered support, feedback and validation.    Assessment:    Patient response:   Patient responded to session by accepting " feedback, giving feedback, listening and being attentive    Possible barriers to participation / learning include: and no barriers identified    Health Issues:   None reported       Substance Use Review:   Substance Use: No active concerns identified.    Mental Status/Behavioral Observations  Appearance:   Appropriate   Eye Contact:   Fair   Psychomotor Behavior: Normal   Attitude:   Cooperative  Interested Friendly  Orientation:   All  Speech   Rate / Production: Normal    Volume:  Normal   Mood:    Anxious  Depressed   Affect:    Constricted   Thought Content:   Clear  Thought Form:  Coherent  Logical     Insight:    Fair     Plan:     Safety Plan: Recommended that patient call 911 or go to the local ED should there be a change in any of these risk factors.     Barriers to treatment: None identified    Patient Contracts (see media tab):  None    Substance Use: Not addressed in session     Continue or Discharge: Patient will continue in Adult Partial Hospitalization Program (PHP)  as planned. Patient is likely to benefit from learning and using skills as they work toward the goals identified in their treatment plan.      Arina Singh, SABI  August 31, 2020

## 2020-08-31 NOTE — PROGRESS NOTES
Acknowledgement of Current Treatment Plan       I have reviewed my treatment plan with my therapist / counselor on 8/31/2020.   I agree with the plan as it is written in the electronic health record.    Name:      Signature:  Cristel TUAN Grissom Unable to sign due to covid. compleleted via telephone     Dr. Emmanuel Thomas MD  Psychiatrist    Arina Singh MA, [facility]   Psychotherapist  Arina Singh MA [facility]

## 2020-08-31 NOTE — GROUP NOTE
Psychoeducation Group Note    PATIENT'S NAME: Cristel Grissom  MRN:   [id number]  :   2000  ACCT. NUMBER: [id number]  DATE OF SERVICE: 20  START TIME: 11:00 AM  END TIME: 11:50 AM  FACILITATOR: Flores Maldonado LICSW  TOPIC: MH Life Skills Group: Lifestyle Balance and Structure  Adult Partial Hospitalization Program  TRACK: 2    Telemedicine Visit: The patient's condition can be safely assessed and treated via synchronous audio and visual telemedicine encounter.      Reason for Telemedicine Visit: Services only offered telehealth    Originating Site (Patient Location): Patient's home    Distant Site (Provider Location): [facility]: [facility] [place]    Consent:  The patient/guardian has verbally consented to: the potential risks and benefits of telemedicine (video visit) versus in person care; bill my insurance or make self-payment for services provided; and responsibility for payment of non-covered services.     Mode of Communication:  Video Conference via [employer]    As the provider I attest to compliance with applicable laws and regulations related to telemedicine.      NUMBER OF PARTICIPANTS: 4    Summary of Group / Topics Discussed:  Lifestyle Balance and Strucure:  Goal-setting & integration: Patients were introduced to the process and benefits of setting realistic, timely, and achievable goals to help support their ability to follow through with meaningful personal, health, recovery and treatment goals that they would like to achieve to improve overall functioning.  Patients were also taught and then practiced techniques to manage a variety of obstacles to achieve their goals and how to break goals into manageable pieces.  Patients also reported on follow through of goals.     Patient Session Goals / Objectives:    Facilitated the creation of a vision for recovery and wellbeing    Identified and wrote meaningful SMART goals to engage in meaningful activities of daily living      Identified and problem solved barriers to achieving goals     Identified plan to support follow through on goals and reflection on progress made          Patient Participation / Response:  Fully participated with the group by sharing personal reflections / insights and openly received / provided feedback with other participants.    Verbalized understanding of content and Patient worked towards initial treatment plan goals     Treatment Plan:  Patient has an initial individualized treatment plan that was created as part of their diagnostic assessment / admission process.  A master individualized treatment plan is in the process of being developed with the patient and multi-disciplinary care team.    MAIKOL DumontSW

## 2020-08-31 NOTE — GROUP NOTE
Psychotherapy Group Note    PATIENT'S NAME: Cristel Grissom  MRN:   [id number]  :   2000  ACCT. NUMBER: [id number]  DATE OF SERVICE: 20  START TIME: 10:00 AM  END TIME: 10:50 AM  FACILITATOR: Arina Singh LPCC  TOPIC:  EBP Group: Specialty Awareness  Adult Partial Hospitalization Program  TRACK: 2    NUMBER OF PARTICIPANTS: 5    Telemedicine Visit: The patient's condition can be safely assessed and treated via synchronous audio and visual telemedicine encounter.      Reason for Telemedicine Visit: Services only offered telehealth    Originating Site (Patient Location): Patient's home    Distant Site (Provider Location): Provider Remote Setting    Consent:  The patient/guardian has verbally consented to: the potential risks and benefits of telemedicine (video visit) versus in person care; bill my insurance or make self-payment for services provided; and responsibility for payment of non-covered services.     Mode of Communication:  Video Conference via [employer]    As the provider I attest to compliance with applicable laws and regulations related to telemedicine.     Summary of Group / Topics Discussed:  Specialty Topics: Education Support Network: Patients worked on identifying the mild, moderate, and severe symptoms of their disorder.  Patients identified helpful supportive statements and actions they would appreciate from caregivers.  The goal is to gather information in preparation for the education support network for problem solving and planning for support with caregivers.       Patient Session Goals / Objectives:    Understanding diagnoses and accompanying physical reactions, thoughts, and behaviors.      Explored options to support patients in their recovery     Formulated a plan with caregivers for assistance and support to aid in recovery     Problem solved barriers to follow through on plan        Patient Participation / Response:  Fully participated with the group by sharing  personal reflections / insights and openly received / provided feedback with other participants.    Demonstrated understanding of topics discussed through group discussion and participation    Treatment Plan:  Patient has an initial individualized treatment plan that was created as part of their diagnostic assessment / admission process.  A master individualized treatment plan is in the process of being developed with the patient and multi-disciplinary care team.    Arina Singh, [facility]

## 2020-09-01 ENCOUNTER — HOSPITAL ENCOUNTER (OUTPATIENT)
Dept: BEHAVIORAL HEALTH | Facility: CLINIC | Age: 20
End: 2020-09-01
Attending: PSYCHIATRY & NEUROLOGY
Payer: COMMERCIAL

## 2020-09-01 PROCEDURE — H0035 MH PARTIAL HOSP TX UNDER 24H: HCPCS | Mod: 95 | Performed by: COUNSELOR

## 2020-09-01 PROCEDURE — H0035 MH PARTIAL HOSP TX UNDER 24H: HCPCS | Mod: GT

## 2020-09-01 NOTE — GROUP NOTE
Psychotherapy Group Note    PATIENT'S NAME: Cristel Grissom  MRN:   [id number]  :   2000  ACCT. NUMBER: [id number]  DATE OF SERVICE: 20  START TIME:  2:00 PM  END TIME:  2:50 PM  FACILITATOR: Flores Maldonado LICSW  TOPIC: MH EBP Group: Relationship Skills  Adult Partial Hospitalization Program  TRACK: 2    Telemedicine Visit: The patient's condition can be safely assessed and treated via synchronous audio and visual telemedicine encounter.      Reason for Telemedicine Visit: Services only offered telehealth    Originating Site (Patient Location): Patient's home    Distant Site (Provider Location): [facility] [facility] [place]    Consent:  The patient/guardian has verbally consented to: the potential risks and benefits of telemedicine (video visit) versus in person care; bill my insurance or make self-payment for services provided; and responsibility for payment of non-covered services.     Mode of Communication:  Video Conference via [employer]    As the provider I attest to compliance with applicable laws and regulations related to telemedicine.      NUMBER OF PARTICIPANTS: 4    Summary of Group / Topics Discussed:  Relationship Skills: Assertive Communication: Patients were provided with a general overview of assertive communication skills and how practicing assertive communication skills will assist patients in developing healthier and more effective relationships. Patients reviewed their current awareness on ability to practice assertive communication, ways to increase assertive communication, and identified/problem solved barriers to assertive communication.     Patient Session Goals / Objectives:    Identified and discussed patient individual challenges with communication    Presented and practiced effective communication skills in session    Assisted patients in implementing more effective communication skills in their relationships      Patient Participation / Response:  Fully  participated with the group by sharing personal reflections / insights and openly received / provided feedback with other participants.    Demonstrated understanding of topics discussed through group discussion and participation and Demonstrated understanding of relationship skills and communication skills    Treatment Plan:  Patient has a current master individualized treatment plan.  See Epic treatment plan for more information.    Flores Newman, MAIKOLSW

## 2020-09-01 NOTE — GROUP NOTE
Process Group Note    PATIENT'S NAME: Cristel Grissom  MRN:   [id number]  :   2000  ACCT. NUMBER: [id number]  DATE OF SERVICE: 20  START TIME:  1:00 PM  END TIME:  1:50 PM  FACILITATOR: Arina Singh LPCC  TOPIC:  Process Group    Diagnoses:  Major depressive disorder, recurrent.  Generalized anxiety disorder.  Posttraumatic stress disorder.     Adult Partial Hospitalization Program  TRACK: 2    NUMBER OF PARTICIPANTS: 5    Telemedicine Visit: The patient's condition can be safely assessed and treated via synchronous audio and visual telemedicine encounter.      Reason for Telemedicine Visit: Services only offered telehealth    Originating Site (Patient Location): Patient's home    Distant Site (Provider Location): Provider Remote Setting    Consent:  The patient/guardian has verbally consented to: the potential risks and benefits of telemedicine (video visit) versus in person care; bill my insurance or make self-payment for services provided; and responsibility for payment of non-covered services.     Mode of Communication:  Video Conference via [employer]    As the provider I attest to compliance with applicable laws and regulations related to telemedicine.            Data:    Session content: At the start of this group, patients were invited to check in by identifying themselves, describing their current emotional status, and identifying issues to address in this group.   Area(s) of treatment focus addressed in this session included Symptom Management and Personal Safety.  She reported feeling tired and lazy. Her goal is to make sure she eats a meal today. She denied safety concerns and suicidal ideation. She denied chemical use and noted being medication compliant. She mentioned feeling proud that she was able to shower. She was open to re-framing negative self-talk.     Therapeutic Interventions/Treatment Strategies:  Psychotherapist offered support, feedback and validation and reinforced use  of skills. Interventions include Cognitive Restructuring:  Facilitated recognition of the connection between negative thoughts and negative core beliefs.    Assessment:    Patient response:   Patient responded to session by accepting feedback, giving feedback, listening and being attentive    Possible barriers to participation / learning include: and no barriers identified    Health Issues:   None reported       Substance Use Review:   Substance Use: No active concerns identified.    Mental Status/Behavioral Observations  Appearance:   Appropriate   Eye Contact:   Fair   Psychomotor Behavior: Restless   Attitude:   Cooperative  Interested Friendly  Orientation:   All  Speech   Rate / Production: Normal    Volume:  Soft   Mood:    Anxious  Depressed   Affect:    Constricted   Thought Content:   Clear  Thought Form:  Coherent  Logical     Insight:    Fair     Plan:     Safety Plan: No current safety concerns identified.  Recommended that patient call 911 or go to the local ED should there be a change in any of these risk factors.     Barriers to treatment: None identified    Patient Contracts (see media tab):  None    Substance Use: Not addressed in session     Continue or Discharge: Patient will continue in Adult Partial Hospitalization Program (PHP)  as planned. Patient is likely to benefit from learning and using skills as they work toward the goals identified in their treatment plan.      Arina Singh, [facility]  September 1, 2020

## 2020-09-01 NOTE — GROUP NOTE
Psychotherapy Group Note    PATIENT'S NAME: Cristel Grissom  MRN:   [id number]  :   2000  ACCT. NUMBER: [id number]  DATE OF SERVICE: 20  START TIME:  4:00 PM  END TIME:  6:00 PM  FACILITATOR: Claudette Mobley LPCC  TOPIC: MH EBP Group: Specialty Awareness  Adult Partial Hospitalization Program  TRACK: PHP 2    NUMBER OF PARTICIPANTS: 4    Summary of Group / Topics Discussed:  Specialty Topics: Education Support Network: Patients worked on identifying the mild, moderate, and severe symptoms of their disorder.  Patients identified helpful supportive statements and actions they would appreciate from caregivers.  The goal is to gather information in preparation for the education support network for problem solving and planning for support with caregivers.    Education Support Network:  Patients and caregivers received an overview of diagnoses including physical, intellectual, and behavioral indicators of illness. Patients presented information created in the support network development group to engage caregivers in planning for help and support to manage mental health symptoms.  The goal is to help patients and caregivers create a plan for support and assistance after discharge.      Patient Session Goals / Objectives:    Understanding diagnoses and accompanying physical reactions, thoughts, and behaviors.      Explored options to support patients in their recovery     Formulated a plan with caregivers for assistance and support to aid in recovery     Problem solved barriers to follow through on plan        Patient Participation / Response:  Moderately participated, sharing some personal reflections / insights and adequately adequately received / provided feedback with other participants.    Demonstrated understanding of topics discussed through group discussion and participation and Identified / Expressed readiness to act on skill suggestions discussed in topic    Treatment Plan:  Patient has a current  master individualized treatment plan.  See Epic treatment plan for more information.    Claudette Mobley, [facility]

## 2020-09-02 ENCOUNTER — TELEPHONE (OUTPATIENT)
Dept: BEHAVIORAL HEALTH | Facility: CLINIC | Age: 20
End: 2020-09-02

## 2020-09-02 ENCOUNTER — HOSPITAL ENCOUNTER (OUTPATIENT)
Dept: BEHAVIORAL HEALTH | Facility: CLINIC | Age: 20
End: 2020-09-02
Attending: PSYCHIATRY & NEUROLOGY
Payer: COMMERCIAL

## 2020-09-02 PROCEDURE — H0035 MH PARTIAL HOSP TX UNDER 24H: HCPCS | Mod: GT

## 2020-09-02 PROCEDURE — H0035 MH PARTIAL HOSP TX UNDER 24H: HCPCS | Mod: GT | Performed by: SOCIAL WORKER

## 2020-09-02 NOTE — GROUP NOTE
Psychotherapy Group Note    PATIENT'S NAME: Cristel Grissom  MRN:   [id number]  :   2000  ACCT. NUMBER: [id number]  DATE OF SERVICE: 20  START TIME:  2:00 PM  END TIME:  2:50 PM  FACILITATOR: Jasmin Ayoub LICSW  TOPIC:  EBP Group: Enhanced Mindfulness  Adult Partial Hospitalization Program  TRACK: 2    NUMBER OF PARTICIPANTS: 2    Summary of Group / Topics Discussed:  Enhanced Mindfulness: Body and Mind Integration: Patients received an overview and education regarding the importance of including the body in the management of emotional health and self-care and as a direct route to mindfulness practice.  Patients discussed various ways in which the body can serve as an informant to their physical and emotional experiences/need. Patients discussed the body as a direct link to the present moment and to mindfulness practice.  Patients discussed current relationship with body, self-awareness, mindfulness practice and barriers to connection with body.  Patients were guided through breath work and movement to facilitate greater self-awareness, grounding, self-expression, and connection to other.  Patients discussed how the experiential could be applied to better manage mental health and develop turcios connection to self.    Patient Session Goals / Objectives:    Identify how movement awareness could be used for grounding, stress management, self-expression, connection to other and self-regulation    Improved awareness of breath and movement preferences    Identify how movement and the body is used in mindfulness practice    Reflect on use of these practices in everyday life.    Identify barriers to attending to body  Telemedicine Visit: The patient's condition can be safely assessed and treated via synchronous audio and visual telemedicine encounter.      Reason for Telemedicine Visit: Services only offered telehealth and covid19    Originating Site (Patient Location): Patient's home    Distant Site  (Provider Location): Provider Remote Setting    Consent:  The patient/guardian has verbally consented to: the potential risks and benefits of telemedicine (video visit) versus in person care; bill my insurance or make self-payment for services provided; and responsibility for payment of non-covered services.     Mode of Communication:  Video Conference via [employer]    As the provider I attest to compliance with applicable laws and regulations related to telemedicine.       Patient Participation / Response:  Fully participated with the group by sharing personal reflections / insights and openly received / provided feedback with other participants.    Demonstrated understanding of topics discussed through group discussion and participation and Practiced skills in session    Treatment Plan:  Patient has a current master individualized treatment plan.  See Epic treatment plan for more information.    MAIKOL VargasSW

## 2020-09-02 NOTE — ADDENDUM NOTE
Encounter addended by: Priyank Prescott, OT on: 9/2/2020 4:37 PM   Actions taken: Clinical Note Signed, Charge Capture section accepted

## 2020-09-02 NOTE — GROUP NOTE
Psychoeducation Group Note    PATIENT'S NAME: Cristel Grissom  MRN:   [id number]  :   2000  ACCT. NUMBER: [id number]  DATE OF SERVICE: 20  START TIME:  1:00 PM  END TIME:  1:50 PM  FACILITATOR: Priyank Prescott OT  TOPIC: [facility] OT Group: Self- Regulation Skills  Adult Partial Hospitalization Program  TRACK: Group 2    Telemedicine Visit: The patient's condition can be safely assessed and treated via synchronous audio and visual telemedicine encounter.      Reason for Telemedicine Visit: Services only offered telehealth and Covid 19    Originating Site (Patient Location): Patient's home    Distant Site (Provider Location): Provider Remote Setting    Consent:  The patient/guardian has verbally consented to: the potential risks and benefits of telemedicine (video visit) versus in person care; bill my insurance or make self-payment for services provided; and responsibility for payment of non-covered services.     Mode of Communication:  Video Conference via [employer]    As the provider I attest to compliance with applicable laws and regulations related to telemedicine.      NUMBER OF PARTICIPANTS: 3    Summary of Group / Topics Discussed:  Self-Regulation Skills: Benefits of focused activity: Patients were provided with education on mindful ways to practice and engage in self regulatory activities to develop agency with self regulation and gain awareness of different ways to be mindful. Overview of the umbrella of mindfulness, which includes emotional regulation, body awareness, and attentional regulation. Focus of session today was on attentional regulation and engaging in an experiential process to tune into nervous system activation and notice the impact the focused group cognitive activity has on their individual nervous systems. Facilitation of discussion/sharing and reflection for application of concept into their daily life.      Patient Session Goals / Objectives:    [place] how the ANS works and  importance of its' impact on functioning and mental wellbeing    [place] how developing awareness on how focused activity can help self-regulate sooner rather than later    Identified signs and symptoms of current level of arousal and ways to make changes in level of arousal when needed    Identified specific and individualized neurosensory skills to help when distressed and for crisis management    Established a plan for practice of these skills in their own environments.      Patient Participation / Response:  Fully participated with the group by sharing personal reflections / insights and openly received / provided feedback with other participants.    Verbalized understanding of content and Patient would benefit from additional opportunities to practice the content to be able to generalize it to their everyday life with increased intentionality, consistency, and efficacy in support of their psychiatric recovery    Treatment Plan:  Patient has an initial individualized treatment plan that was created as part of their diagnostic assessment / admission process.  A master individualized treatment plan is in the process of being developed with the patient and multi-disciplinary care team.    Priyank Prescott, OT

## 2020-09-02 NOTE — PROGRESS NOTES
[facility]   Dr. Thomas's Psychiatric Progress Note  2020      Patient:  Cristel Grissom   Medical Record Number:  [id number]  :  2000          Interim History:   The patient's care was discussed with the treatment team and chart notes were reviewed.  Doing so-so today.  Missed group this morning.  Anxious.      Psychiatric ROS:  Mood: anxiety > depression  Sleep:   Can sleep through the night;  Poor sleep schedule;  Stayed up late and slept thru group this morning  Appetite:  poor  Eating:  Makes self eat, but eats less than normal;    Energy Level:  good  Concentration/Memory:  Sometimes ok;  Sometimes not;    Suicidal Thoughts:No  Homicidal Thoughts:No  Psychotic Symptoms: No  Medication Side Effects:  Yes eyes and mouth are dry;  Constipation;   Eyes hurt;    Medication Compliance:Yes   Physical Complaints:negative         Medications:     PAST PSYCH MEDS:  Zoloft, Paxil, Abilify, Seroquel, propranolol, Cogentin, Vistaril, Neurontin, melatonin    Current Outpatient Medications   Medication Sig     .  Neurontin 200 mg 3 times daily as needed for anxiety.     Hydroxyzine 50 mg 4 times daily.     Melatonin 3 mg at bedtime as needed.     Propranolol 10 mg 3 times daily.    Seroquel 200 mg at bedtime.    Seroquel 50 four times daily as needed for anxiety.    MiraLax 17 grams daily as needed.       No current facility-administered medications for this encounter.              Allergies:     Allergies   Allergen Reactions     Bactrim [Sulfamethoxazole W/Trimethoprim]      When very young, vomiting likely per mom     Cefzil [Cefprozil]      When very young, vomiting likely per mom     Penicillins      When very young, vomiting likely per mom     Sulfa Drugs      When very young, vomiting likely per mom            Psychiatric Examination:   There were no vitals taken for this visit.  Weight is 0 lbs 0 oz  There is no height or weight on file to calculate  BMI.    Appearance:  awake, alert and adequately groomed  Attitude:  cooperative  Eye Contact:  good  Mood:  anxious  Affect:  mood congruent  Speech:  clear, coherent  Psychomotor Behavior:  no evidence of tardive dyskinesia, dystonia, or tics  Throught Process:  logical  Associations:  no loose associations  Thought Content:  no evidence of suicidal ideation or homicidal ideation and no evidence of psychotic thought  Insight:  good  Judgement:  intact  Oriented to:  time, person, and place  Attention Span and Concentration:  intact  Recent and Remote Memory:  intact  Gait:Normal    Risk/Potential for Dangerousness:  Multiple Active Diagnoses:HIGH  Self Care:HIGH  Suicide:LOW  Assault:LOW  Self Injurious Behaviors:LOW  Inappropriate Sexual Behavior:LOW         Labs:   No results found for this or any previous visit (from the past 24 hour(s)).     Recent Results (from the past 1008 hour(s))   Urine Drugs of Abuse Screen Panel 13    Collection Time: 08/02/20  2:17 AM   Result Value Ref Range    Cannabinoids (41-[id number]-9-[id number]-9-THC) Not Detected NDET^Not Detected ng/mL    Phencyclidine (Phencyclidine) Not Detected NDET^Not Detected ng/mL    Cocaine (Benzoylecgonine) Not Detected NDET^Not Detected ng/mL    Methamphetamine (d-Methamphetamine) Not Detected NDET^Not Detected ng/mL    Opiates (Morphine) Not Detected NDET^Not Detected ng/mL    Amphetamine (d-Amphetamine) Not Detected NDET^Not Detected ng/mL    Benzodiazepines (Nordiazepam) Not Detected NDET^Not Detected ng/mL    Tricyclic Antidepressants (Desipramine) Not Detected NDET^Not Detected ng/mL    Methadone (Methadone) Not Detected NDET^Not Detected ng/mL    Barbiturates (Butalbital) Not Detected NDET^Not Detected ng/mL    Oxycodone (Oxycodone) Not Detected NDET^Not Detected ng/mL    Propoxyphene (Norpropoxyphene) Not Detected NDET^Not Detected ng/mL    Buprenorphine (Buprenorphine) Not Detected NDET^Not Detected ng/mL   HCG qualitative urine (UPT)    Collection  Time: 08/02/20  2:17 AM   Result Value Ref Range    HCG Qual Urine Negative NEG^Negative   Symptomatic COVID-19 Virus (Coronavirus) by PCR    Collection Time: 08/02/20  6:30 AM    Specimen: Nasopharyngeal   Result Value Ref Range    COVID-19 Virus PCR to [facility] - Source Nares     COVID-19 Virus PCR to [facility] - Result       Test received-See reflex to IDDL test SARS CoV2 (COVID-19) Virus RT-PCR   SARS-CoV-2 COVID-19 Virus (Coronavirus) RT-PCR Nasopharyngeal    Collection Time: 08/02/20  6:30 AM    Specimen: Nasopharyngeal   Result Value Ref Range    SARS-CoV-2 Virus Specimen Source Nares     SARS-CoV-2 PCR Result NEGATIVE     SARS-CoV-2 PCR Comment       Testing was performed using the [employer]faert Xpress SARS-CoV-2 Assay on the Cepheid Gene-Xpert   Instrument Systems. Additional information about this Emergency Use Authorization (EUA)   assay can be found via the Lab Guide.     Potassium    Collection Time: 08/06/20 12:00 AM   Result Value Ref Range    Potassium 4.2 3.5 - 5.0 mmol/L   Glucose    Collection Time: 08/06/20 12:00 AM   Result Value Ref Range    Glucose 83 70 - 125 mg/dL   Creatinine    Collection Time: 08/06/20 12:00 AM   Result Value Ref Range    Creatinine 0.68 0.60 - 1.10 mg/dL    GFR Estimate >60 >60 ml/min/1.73m2    GFR Estimate If Black >60 >60 ml/min/1.73m2   AST    Collection Time: 08/06/20 12:00 AM   Result Value Ref Range    AST 9 0 - 40 U/L   ALT    Collection Time: 08/06/20 12:00 AM   Result Value Ref Range    ALT <9 0 - 45 U/L         Impression:   This is a 20 year old female continues PHP for mood stabilization.  She's having a lot of anticholinergic SE from meds, even off Cogentin.          DIagnoses:     Axis I:  Major depressive disorder, recurrent.  Generalized anxiety disorder.  Posttraumatic stress disorder.   Axis II:  Deferred.   Axis III:  No diagnosis.          Plan:     Continue [facility].   Continue current  medications, which were just started at [facility].   Tapered off Cogentin;     Change Vistaril to 25 to 50mg po qid prn anxiety.    She might try prn Seroquel instead of prn Vistaril to see if it has less SE's   Expect stabilization and completion of Partial Hospital Program.     Emmanuel Thomas MD

## 2020-09-03 ENCOUNTER — HOSPITAL ENCOUNTER (OUTPATIENT)
Dept: BEHAVIORAL HEALTH | Facility: CLINIC | Age: 20
End: 2020-09-03
Attending: PSYCHIATRY & NEUROLOGY
Payer: COMMERCIAL

## 2020-09-03 PROCEDURE — H0035 MH PARTIAL HOSP TX UNDER 24H: HCPCS | Mod: 95 | Performed by: OCCUPATIONAL THERAPIST

## 2020-09-03 PROCEDURE — H0035 MH PARTIAL HOSP TX UNDER 24H: HCPCS | Mod: GT | Performed by: COUNSELOR

## 2020-09-03 NOTE — GROUP NOTE
Psychotherapy Group Note    PATIENT'S NAME: Cristel Grissom  MRN:   [id number]  :   2000  ACCT. NUMBER: [id number]  DATE OF SERVICE: 20  START TIME:  2:00 PM  END TIME:  2:50 PM  FACILITATOR: Arina Singh LPCC  TOPIC:  EBP Group: Mindfulness  Adult Partial Hospitalization Program  TRACK: 2    NUMBER OF PARTICIPANTS: 3    Telemedicine Visit: The patient's condition can be safely assessed and treated via synchronous audio and visual telemedicine encounter.      Reason for Telemedicine Visit: Services only offered telehealth    Originating Site (Patient Location): Patient's home    Distant Site (Provider Location): Provider Remote Setting    Consent:  The patient/guardian has verbally consented to: the potential risks and benefits of telemedicine (video visit) versus in person care; bill my insurance or make self-payment for services provided; and responsibility for payment of non-covered services.     Mode of Communication:  Video Conference via [employer]    As the provider I attest to compliance with applicable laws and regulations related to telemedicine.     Summary of Group / Topics Discussed:  Mindfulness: Mindfulness Experiential: Patients received an overview on what mindfulness is and how mindfulness can benefit general health, mental health symptoms, and stressors. The history of mindfulness, its application to mental health therapies, and key concepts were also discussed. Patients discussed current awareness, knowledge, and practice of mindfulness skills. Patients also discussed barriers to mindfulness practice.  Patients participated in a guided imagery activity. They started to create their own imagery script using their senses to practice and use when in distress.   Patient Session Goals / Objectives:    Demonstrated and verbalized understanding of key mindfulness concepts    Identified when/how to use mindfulness skills    Resolved barriers to practicing mindfulness  skills    Identified plan to use mindfulness skills in daily life       Patient Participation / Response:  Fully participated with the group by sharing personal reflections / insights and openly received / provided feedback with other participants.    Demonstrated understanding of topics discussed through group discussion and participation and Demonstrated understanding of mindfulness skills and benefits of practice    Treatment Plan:  Patient has a current master individualized treatment plan.  See Epic treatment plan for more information.    Arina Singh, [facility]

## 2020-09-03 NOTE — GROUP NOTE
Psychoeducation Group Note    PATIENT'S NAME: Cristel Grissom  MRN:   [id number]  :   2000  ACCT. NUMBER: [id number]  DATE OF SERVICE: 20  START TIME: 10:00 AM  END TIME: 10:50 AM  FACILITATOR: Rachelle Akins OTR/L  TOPIC: MH PHP OT Group: Self- Regulation Skills  Adult Partial Hospitalization Program  TRACK: 2    NUMBER OF PARTICIPANTS: 3  Telemedicine Visit: The patient's condition can be safely assessed and treated via synchronous audio and visual telemedicine encounter.      Reason for Telemedicine Visit: Services only offered telehealth    Originating Site (Patient Location): Patient's home    Distant Site (Provider Location): [facility]: [facility]    Consent:  The patient/guardian has verbally consented to: the potential risks and benefits of telemedicine (video visit) versus in person care; bill my insurance or make self-payment for services provided; and responsibility for payment of non-covered services.     Mode of Communication:  Video Conference via [employer]    As the provider I attest to compliance with applicable laws and regulations related to telemedicine.      Summary of Group / Topics Discussed:  Self-Regulation Skills: Coping Through the Senses Introduction: Patients were introduced and taught about neurosensory based skills and strategies related to supporting effective self regulation skills.  Patients were taught about the eight sensory systems (external and internal) and how they can be used for coping with mental health symptoms and stressors.  Patients were provided with an experiential opportunity to increase self-awareness of helpful sensory input and self care activities. Patients were introduced on how to create supportive environments that encourage use of these skills.    Patient Session Goals / Objectives:    Review/learn the 8 sensory systems and how they relate to self-regulation    Identified specific and individualized neurosensory skills to  help when distressed      Identified skills learned and how this applies to current daily life    Established a plan for practice of these skills in their own environments      Patient Participation / Response:  Fully participated with the group by sharing personal reflections / insights and openly received / provided feedback with other participants.    Patient presentation: shared some sensory techniques that she does currently use, open to listen and explore more, Verbalized understanding of content and Patient would benefit from additional opportunities to practice the content to be able to generalize it to their everyday life with increased intentionality, consistency, and efficacy in support of their psychiatric recovery    Treatment Plan:  Patient has a current master individualized treatment plan.  See Epic treatment plan for more information.    Rachelle Akins, OTR/L

## 2020-09-03 NOTE — GROUP NOTE
Psychotherapy Group Note    PATIENT'S NAME: Cristel Grissom  MRN:   [id number]  :   2000  ACCT. NUMBER: [id number]  DATE OF SERVICE: 20  START TIME: 11:00 AM  END TIME: 11:50 AM  FACILITATOR: Arina Singh LPCC  TOPIC:  EBP Group: Coping Skills  Adult Partial Hospitalization Program  TRACK: 2    Telemedicine Visit: The patient's condition can be safely assessed and treated via synchronous audio and visual telemedicine encounter.      Reason for Telemedicine Visit: Services only offered telehealth    Originating Site (Patient Location): Patient's home    Distant Site (Provider Location): Provider Remote Setting    Consent:  The patient/guardian has verbally consented to: the potential risks and benefits of telemedicine (video visit) versus in person care; bill my insurance or make self-payment for services provided; and responsibility for payment of non-covered services.     Mode of Communication:  Video Conference via [email or web address]    As the provider I attest to compliance with applicable laws and regulations related to telemedicine.     NUMBER OF PARTICIPANTS: 3    Summary of Group / Topics Discussed:  Coping Skills: Distraction: Patients learned to mindfully use distraction as a way to decrease heightened stress in the moment.  Patients will identified situations that necessitate healthy distraction strategies.  They explored ways to manage physical symptoms of distress using distraction. The group began to distinguish when this can be useful in their lives or when other strategies would be more relevant or helpful.    Patient Session Goals / Objectives:    Understand the purpose and benefits of using healthy distraction to decrease distress.    Process what happens in the body when using distraction strategies.    Demonstrate understanding of when to use distraction strategies.    Explore patient s current distraction activities, and how to take a more intentional approach to the use of  distraction.    Identify and problem solve barriers to applying distraction strategies.    Choose 1-2 healthy distraction strategies to apply during times of distress.        Patient Participation / Response:  Fully participated with the group by sharing personal reflections / insights and openly received / provided feedback with other participants.    Demonstrated understanding of topics discussed through group discussion and participation, Expressed understanding of the relevance / importance of coping skills at distressing times in life and Demonstrated knowledge of when to consider using a variety of coping skills in daily life    Treatment Plan:  Patient has a current master individualized treatment plan.  See Epic treatment plan for more information.    Arina Singh, [facility]

## 2020-09-03 NOTE — GROUP NOTE
Psychoeducation Group Note    PATIENT'S NAME: Cristel Grissom  MRN:   [id number]  :   2000  ACCT. NUMBER: [id number]  DATE OF SERVICE: 20  START TIME:  1:00 PM  END TIME:  1:50 PM  FACILITATOR: Arina Singh LPCC  TOPIC:  RN Group: Health Maintenance  Adult Partial Hospitalization Program  TRACK: 2    Telemedicine Visit: The patient's condition can be safely assessed and treated via synchronous audio and visual telemedicine encounter.      Reason for Telemedicine Visit: Services only offered telehealth    Originating Site (Patient Location): Patient's home    Distant Site (Provider Location): Provider Remote Setting    Consent:  The patient/guardian has verbally consented to: the potential risks and benefits of telemedicine (video visit) versus in person care; bill my insurance or make self-payment for services provided; and responsibility for payment of non-covered services.     Mode of Communication:  Video Conference via [employer]    As the provider I attest to compliance with applicable laws and regulations related to telemedicine.      NUMBER OF PARTICIPANTS: 2    Summary of Group / Topics Discussed:  Health Maintenance: Goal Setting: Meaningful goals can bring a sense of direction and purpose in life.  They also highlight our most important values. Patients were assisted by instructor to identify short term goals to promote their mental health recovery and improve overall health and wellness. Patients were educated on SMART goal setting framework as a strategy to increase outcomes and promote success.    Patient Session Goals / Objectives:  ? Explained the key concepts of SMART goal setting framework  ? Identified three goals successfully using SMART goal setting framework  ? Reviewed concept of balance in wellness as it pertains to goal setting        Patient Participation / Response:  Fully participated with the group by sharing personal reflections / insights and openly received / provided  feedback with other participants.    Demonstrated understanding of topics discussed through group discussion and participation    Treatment Plan:  Patient has a current master individualized treatment plan.  See Epic treatment plan for more information.    Arina Singh, STACIC

## 2020-09-03 NOTE — ADDENDUM NOTE
Encounter addended by: Arina Singh [facility] on: 9/3/2020 8:57 AM   Actions taken: Flowsheet accepted

## 2020-09-03 NOTE — ADDENDUM NOTE
Encounter addended by: Jasmin Brice [facility] on: 9/3/2020 3:55 PM   Actions taken: Flowsheet accepted

## 2020-09-03 NOTE — GROUP NOTE
Process Group Note    PATIENT'S NAME: Cristel Grissom  MRN:   [id number]  :   2000  ACCT. NUMBER: [id number]  DATE OF SERVICE: 20  START TIME:  9:00 AM  END TIME:  9:50 AM  FACILITATOR: Arina Singh LPCC  TOPIC:  Process Group    Diagnoses:  Major depressive disorder, recurrent.  Generalized anxiety disorder.  Posttraumatic stress disorder.     Adult Partial Hospitalization Program  TRACK: 2    NUMBER OF PARTICIPANTS: 3    Telemedicine Visit: The patient's condition can be safely assessed and treated via synchronous audio and visual telemedicine encounter.      Reason for Telemedicine Visit: Services only offered telehealth    Originating Site (Patient Location): Patient's home    Distant Site (Provider Location): Provider Remote Setting    Consent:  The patient/guardian has verbally consented to: the potential risks and benefits of telemedicine (video visit) versus in person care; bill my insurance or make self-payment for services provided; and responsibility for payment of non-covered services.     Mode of Communication:  Video Conference via [employer]    As the provider I attest to compliance with applicable laws and regulations related to telemedicine.            Data:    Session content: At the start of this group, patients were invited to check in by identifying themselves, describing their current emotional status, and identifying issues to address in this group.   Area(s) of treatment focus addressed in this session included Personal Safety.  She reported feeling irritated. Her goal is to eat lunch. She denied safety concerns and suicidal ideation. She denied chemical use and noted being medication compliant. She took time to process her irritation.   Therapeutic Interventions/Treatment Strategies:  Psychotherapist offered support, feedback and validation and reinforced use of skills. Interventions include Emotions Management:  Reinforced the purpose and biological basis of emotions and  Increased awareness of daily mood patterns/changes.    Assessment:    Patient response:   Patient responded to session by accepting feedback, giving feedback, listening and being attentive    Possible barriers to participation / learning include: and no barriers identified    Health Issues:   None reported       Substance Use Review:   Substance Use: No active concerns identified.    Mental Status/Behavioral Observations  Appearance:   Appropriate   Eye Contact:   Fair   Psychomotor Behavior: Normal   Attitude:   Cooperative  Interested Friendly  Orientation:   All  Speech   Rate / Production: Normal    Volume:  Normal   Mood:    Anxious  Depressed   Affect:    Constricted   Thought Content:   Clear  Thought Form:  Coherent  Logical     Insight:    Fair     Plan:     Safety Plan: No current safety concerns identified.  Recommended that patient call 911 or go to the local ED should there be a change in any of these risk factors.     Barriers to treatment: None identified    Patient Contracts (see media tab):  None    Substance Use: Not addressed in session     Continue or Discharge: Patient will continue in Adult Partial Hospitalization Program (PHP)  as planned. Patient is likely to benefit from learning and using skills as they work toward the goals identified in their treatment plan.      Arina Singh, [facility]  September 3, 2020

## 2020-09-04 ENCOUNTER — TELEPHONE (OUTPATIENT)
Dept: BEHAVIORAL HEALTH | Facility: CLINIC | Age: 20
End: 2020-09-04

## 2020-09-04 ENCOUNTER — HOSPITAL ENCOUNTER (OUTPATIENT)
Dept: BEHAVIORAL HEALTH | Facility: CLINIC | Age: 20
End: 2020-09-04
Attending: PSYCHIATRY & NEUROLOGY
Payer: COMMERCIAL

## 2020-09-04 DIAGNOSIS — F33.2 SEVERE EPISODE OF RECURRENT MAJOR DEPRESSIVE DISORDER, WITHOUT PSYCHOTIC FEATURES (H): Primary | ICD-10-CM

## 2020-09-04 PROCEDURE — H0035 MH PARTIAL HOSP TX UNDER 24H: HCPCS | Mod: 95 | Performed by: COUNSELOR

## 2020-09-04 PROCEDURE — H0035 MH PARTIAL HOSP TX UNDER 24H: HCPCS | Mod: GT

## 2020-09-04 RX ORDER — LAMOTRIGINE 25 MG/1
TABLET ORAL
Qty: 240 TABLET | Refills: 0 | Status: SHIPPED | OUTPATIENT
Start: 2020-09-04 | End: 2020-12-31 | Stop reason: ALTCHOICE

## 2020-09-04 NOTE — TELEPHONE ENCOUNTER
Johanna with [employer] contacted the [facility] phone line on 9/3/20 and requested information regarding patient's participation in the program. Writer left a message asking Johanna to call back [phone] or the program line to provide insight into the kind of information she is seeking.     Claudette Mobley, [facility] on 9/4/2020 at 3:35 PM

## 2020-09-04 NOTE — GROUP NOTE
Psychoeducation Group Note    PATIENT'S NAME: Cristel Grissom  MRN:   [id number]  :   2000  ACCT. NUMBER: [id number]  DATE OF SERVICE: 20  START TIME: 11:00 AM  END TIME: 11:50 AM  FACILITATOR: Priyank Prescott OT  TOPIC: [facility] OT Group: Self- Regulation Skills  Adult Partial Hospitalization Program  TRACK: Group 2    Telemedicine Visit: The patient's condition can be safely assessed and treated via synchronous audio and visual telemedicine encounter.      Reason for Telemedicine Visit: Services only offered telehealth and Covid 19    Originating Site (Patient Location): Patient's home    Distant Site (Provider Location): Provider Remote Setting    Consent:  The patient/guardian has verbally consented to: the potential risks and benefits of telemedicine (video visit) versus in person care; bill my insurance or make self-payment for services provided; and responsibility for payment of non-covered services.     Mode of Communication:  Video Conference via [employer]    As the provider I attest to compliance with applicable laws and regulations related to telemedicine.      NUMBER OF PARTICIPANTS: 4    Summary of Group / Topics Discussed:  Self-Regulation Skills: Neurosensory Enhanced Mindfulness: Summoning Seasonal Sensory Memories for self-regulation.      Patients were provided with psychoeducation on how using sensory memories can be a helpful strategy to improve a moment or low mood by encouraging a positive thought process using the principles of neuroplasticity. Using the four seasons as a framework, patients were guided through an experiential opportunity to explore and identify specific soothing or engaging sensory input for each season. Group members then identified a positive sensory memory for each season to summon in the future when they would benefit from a positive thought process to help self regulate. They spent time journaling on the specific sensory experiences of that memory and  reflecting on the emotions of that memory to help create a positive feedback loop. Also explored, was the benefit of daily/weekly/seasonal activity rhythms and how they help us pass our time and organize our brain to maintain mental health. Facilitated reflection with group members and validation provided.         Patient Session Goals / Objectives:    Identified how using sensory enhanced mindfulness practices can be used for grounding, stress management, and self regulation      Improved awareness of different types of sensory enhanced mindfulness activities that assist with healthy coping of stress and symptoms.      Established a plan for practice of these skills in their own environmental contexts and everyday life.        Patient Participation / Response:  Fully participated with the group by sharing personal reflections / insights and openly received / provided feedback with other participants.    Verbalized understanding of content and Patient would benefit from additional opportunities to practice the content to be able to generalize it to their everyday life with increased intentionality, consistency, and efficacy in support of their psychiatric recovery    Treatment Plan:  Patient has a current master individualized treatment plan.  See Epic treatment plan for more information.    Priyank Prescott, OT

## 2020-09-04 NOTE — GROUP NOTE
Psychoeducation Group Note    PATIENT'S NAME: Cristel Grissom  MRN:   [id number]  :   2000  ACCT. NUMBER: [id number]  DATE OF SERVICE: 20  START TIME:  2:00 PM  END TIME:  2:50 PM  FACILITATOR: Evelyn Hawkins RN  TOPIC:  RN Group: Health Maintenance  Telemedicine Visit: The patient's condition can be safely assessed and treated via synchronous audio and visual telemedicine encounter.      Reason for Telemedicine Visit:  covid19    Originating Site (Patient Location): Patient's home    Distant Site (Provider Location): Provider Remote Setting    Consent:  The patient/guardian has verbally consented to: the potential risks and benefits of telemedicine (video visit) versus in person care; bill my insurance or make self-payment for services provided; and responsibility for payment of non-covered services.     Mode of Communication:  Video Conference via [employer]    As the provider I attest to compliance with applicable laws and regulations related to telemedicine.      Adult Partial Hospitalization Program  TRACK: 2    NUMBER OF PARTICIPANTS: 2    Summary of Group / Topics Discussed:  Health Maintenance: Weekend planning: Patients were given time to complete a weekend plan of what they will do to promote wellness and sobriety over the weekend when they do not have the structure of group. Patients were encouraged to review progress on their treatment goals and were challenged to identify ways to work toward meeting them. Patients identified and discussed foreseeable barriers to success over the weekend and then developed a plan to overcome them. Patients reviewed their distress coping skills and social support network and discussed this with the group.       Patient Session Goals / Objectives:    ?    Identified activities to engage in that promote balance in wellness  ?    Distinguished possible barriers to success over the weekend and created a plan to overcome them  ?    Listed distress coping  skills and identified social support network to utilize if in crisis during the weekend          Patient Participation / Response:  Fully participated with the group by sharing personal reflections / insights and openly received / provided feedback with other participants.    Demonstrated understanding of topics discussed through group discussion and participation and Identified / Expressed personal readiness to practice skills    Treatment Plan:  Patient has a current master individualized treatment plan.  See Epic treatment plan for more information.    Evelyn Hawkins RN

## 2020-09-04 NOTE — PROGRESS NOTES
Acknowledgement of Current Treatment Plan       I have reviewed my treatment plan with my therapist / counselor on 9/4/2020.   I agree with the plan as it is written in the electronic health record.    Name:      Signature:  Cristel DICKERSON Jaciel Unable to sign due to COVID-19.     Dr. Emmanuel Thomas MD  Psychiatrist    Arina Singh MA, [facility]   Psychotherapist     Jasmin Ayoub MSW, Health system  Psychotherapist     Claudette Mobley MA, [facility]  Psychotherapist  SABI Edward on 9/4/2020 at 12:21 PM

## 2020-09-04 NOTE — PROGRESS NOTES
[facility]   Dr. Thomas's Psychiatric Progress Note  2020      Patient:  Cristel Grissom   Medical Record Number:  [id number]  :  2000          Interim History:   The patient's care was discussed with the treatment team and chart notes were reviewed.  Pt is having blurred vision, which started when she was hospitalized.   She's been taking hydroxyzine 50mg qid, but yesterday took less as the anxiety wasn't too bad.  Yesterday had a lot of mood swings.  Doesn't feel like herself.  Meds are not good.  Last dose of hydroxyzine was 20.      Psychiatric ROS:  Mood: swings: depression, anxiety, irritability  Sleep:   Can sleep all night just fine, even skipping HS hydroxyzine;   Appetite:  Still down;   Eating:  Makes self eat, but eats less than normal;    Energy Level:  good  Concentration/Memory:  Sometimes ok;  Sometimes not;    Suicidal Thoughts:No  Homicidal Thoughts:No  Psychotic Symptoms: No  Medication Side Effects:  Yes eyes and mouth are dry;  Constipation;   Eyes hurt; blurred vision;     Medication Compliance:Yes   Physical Complaints:negative         Medications:     PAST PSYCH MEDS:  Zoloft, Paxil, Abilify, Seroquel, propranolol, Cogentin, Vistaril, Neurontin, melatonin    Current Outpatient Medications   Medication Sig     .  Neurontin 200 mg 3 times daily as needed for anxiety. (rarely)    Hydroxyzine 25 to 50 mg 4 times daily. (try not to use)    Melatonin 3 mg at bedtime as needed.     Propranolol 10 mg 3 times daily. (may get rid of that next week)   Seroquel 200 mg at bedtime. (cut back to 100mg)   Seroquel 50 four times daily as needed for anxiety. (stop using)   MiraLax 17 grams daily as needed.       No current facility-administered medications for this encounter.              Allergies:     Allergies   Allergen Reactions     Bactrim [Sulfamethoxazole W/Trimethoprim]      When very young, vomiting likely per mom     Cefzil [Cefprozil]      When  very young, vomiting likely per mom     Penicillins      When very young, vomiting likely per mom     Sulfa Drugs      When very young, vomiting likely per mom            Psychiatric Examination:   There were no vitals taken for this visit.  Weight is 0 lbs 0 oz  There is no height or weight on file to calculate BMI.    Appearance:  awake, alert and adequately groomed  Attitude:  cooperative  Eye Contact:  good  Mood:  Up and down;   Affect:  mood congruent  Speech:  clear, coherent  Psychomotor Behavior:  no evidence of tardive dyskinesia, dystonia, or tics  Throught Process:  logical  Associations:  no loose associations  Thought Content:  no evidence of suicidal ideation or homicidal ideation and no evidence of psychotic thought  Insight:  good  Judgement:  intact  Oriented to:  time, person, and place  Attention Span and Concentration:  intact  Recent and Remote Memory:  intact  Gait:Normal    Risk/Potential for Dangerousness:  Multiple Active Diagnoses:HIGH  Self Care:HIGH  Suicide:LOW  Assault:LOW  Self Injurious Behaviors:LOW  Inappropriate Sexual Behavior:LOW         Labs:   No results found for this or any previous visit (from the past 24 hour(s)).     Recent Results (from the past 1008 hour(s))   Urine Drugs of Abuse Screen Panel 13    Collection Time: 08/02/20  2:17 AM   Result Value Ref Range    Cannabinoids (42-wun-9-carboxy-9-THC) Not Detected NDET^Not Detected ng/mL    Phencyclidine (Phencyclidine) Not Detected NDET^Not Detected ng/mL    Cocaine (Benzoylecgonine) Not Detected NDET^Not Detected ng/mL    Methamphetamine (d-Methamphetamine) Not Detected NDET^Not Detected ng/mL    Opiates (Morphine) Not Detected NDET^Not Detected ng/mL    Amphetamine (d-Amphetamine) Not Detected NDET^Not Detected ng/mL    Benzodiazepines (Nordiazepam) Not Detected NDET^Not Detected ng/mL    Tricyclic Antidepressants (Desipramine) Not Detected NDET^Not Detected ng/mL    Methadone (Methadone) Not Detected NDET^Not Detected  ng/mL    Barbiturates (Butalbital) Not Detected NDET^Not Detected ng/mL    Oxycodone (Oxycodone) Not Detected NDET^Not Detected ng/mL    Propoxyphene (Norpropoxyphene) Not Detected NDET^Not Detected ng/mL    Buprenorphine (Buprenorphine) Not Detected NDET^Not Detected ng/mL   HCG qualitative urine (UPT)    Collection Time: 08/02/20  2:17 AM   Result Value Ref Range    HCG Qual Urine Negative NEG^Negative   Symptomatic COVID-19 Virus (Coronavirus) by PCR    Collection Time: 08/02/20  6:30 AM    Specimen: Nasopharyngeal   Result Value Ref Range    COVID-19 Virus PCR to [facility] - Source Nares     COVID-19 Virus PCR to [facility] - Result       Test received-See reflex to IDDL test SARS CoV2 (COVID-19) Virus RT-PCR   SARS-CoV-2 COVID-19 Virus (Coronavirus) RT-PCR Nasopharyngeal    Collection Time: 08/02/20  6:30 AM    Specimen: Nasopharyngeal   Result Value Ref Range    SARS-CoV-2 Virus Specimen Source Nares     SARS-CoV-2 PCR Result NEGATIVE     SARS-CoV-2 PCR Comment       Testing was performed using the Xpert Xpress SARS-CoV-2 Assay on the Cepheid Gene-Xpert   Instrument Systems. Additional information about this Emergency Use Authorization (EUA)   assay can be found via the Lab Guide.     Potassium    Collection Time: 08/06/20 12:00 AM   Result Value Ref Range    Potassium 4.2 3.5 - 5.0 mmol/L   Glucose    Collection Time: 08/06/20 12:00 AM   Result Value Ref Range    Glucose 83 70 - 125 mg/dL   Creatinine    Collection Time: 08/06/20 12:00 AM   Result Value Ref Range    Creatinine 0.68 0.60 - 1.10 mg/dL    GFR Estimate >60 >60 ml/min/1.73m2    GFR Estimate If Black >60 >60 ml/min/1.73m2   AST    Collection Time: 08/06/20 12:00 AM   Result Value Ref Range    AST 9 0 - 40 U/L   ALT    Collection Time: 08/06/20 12:00 AM   Result Value Ref Range    ALT <9 0 - 45 U/L         Impression:   This is a 20 year old female continues PHP for mood stabilization.  She's having a lot of anticholinergic SE from meds, even off  Cogentin.          DIagnoses:     Axis I:  Major depressive disorder, recurrent.  Generalized anxiety disorder.  Posttraumatic stress disorder.   Axis II:  Deferred.   Axis III:  No diagnosis.          Plan:     Continue [facility].   Continue current medications, which were just started at [facility].   Stopped Cogentin  Try not to take Hydroxyzine;  Stop the prn doses of Seroquel.    Cut Seroquel HS dose from 200mg to 100mg   Begin Lamictal 25mg/d and titrate by 25mg/week until reaching target dose of 200 mg/d.  [facility] in [place].    Expect stabilization and completion of Partial Hospital Program.     Emmanuel Thomas MD

## 2020-09-04 NOTE — GROUP NOTE
"Process Group Note    PATIENT'S NAME: Cristel Grissom  MRN:   [id number]  :   2000  ACCT. NUMBER: [id number]  DATE OF SERVICE: 20  START TIME:  9:00 AM  END TIME:  9:50 AM  FACILITATOR: Claudette Mobley LPCC  TOPIC:  Process Group    Diagnoses:  Major depressive disorder, recurrent.  Generalized anxiety disorder.  Posttraumatic stress disorder.     Adult Partial Hospitalization Program  TRACK: PHP 2    NUMBER OF PARTICIPANTS: 4    Telemedicine Visit: The patient's condition can be safely assessed and treated via synchronous audio and visual telemedicine encounter.      Reason for Telemedicine Visit: Services only offered telehealth and due to COVID-19    Originating Site (Patient Location): Patient's home    Distant Site (Provider Location): Provider Remote Setting    Consent:  The patient/guardian has verbally consented to: the potential risks and benefits of telemedicine (video visit) versus in person care; bill my insurance or make self-payment for services provided; and responsibility for payment of non-covered services.     Mode of Communication:  Video Conference via [employer]see    As the provider I attest to compliance with applicable laws and regulations related to telemedicine.            Data:    Session content: At the start of this group, patients were invited to check in by identifying themselves, describing their current emotional status, and identifying issues to address in this group.   Area(s) of treatment focus addressed in this session included Symptom Management, Personal Safety and Community Resources/Discharge Planning.  Patient reported feeling frustrated with side effects from her medication; patient reported experiencing blurred vision. Patient goal planned to advocate for herself during her eye doctor appointment this afternoon. Patient noted she has felt \"more open to sharing things\" with the group and healthcare providers.     Therapeutic Interventions/Treatment " Strategies:  Psychotherapist offered support, feedback and validation and reinforced use of skills. Treatment modalities used include Motivational Interviewing and Cognitive Behavioral Therapy. Interventions include Coping Skills: Discussed use of self-soothe skills to decrease distress in the body and Assisted patient in identifying 1-2 healthy distraction skills to reduce overall distress, Mindfulness: Facilitated discussion of when/how to use mindfulness skills to benefit general health, mental health symptoms, and stressors and Symptoms Management: Promoted understanding of their diagnoses and how it impacts their functioning.    Assessment:    Patient response:   Patient responded to session by accepting feedback, listening, focusing on goals and being attentive    Possible barriers to participation / learning include: and no barriers identified    Health Issues:   None reported       Substance Use Review:   Substance Use: No active concerns identified.    Mental Status/Behavioral Observations  Appearance:   Appropriate   Eye Contact:   Good   Psychomotor Behavior: Normal   Attitude:   Cooperative   Orientation:   All  Speech   Rate / Production: Normal/ Responsive Normal    Volume:  Normal   Mood:    Anxious  Normal  Affect:    Appropriate   Thought Content:   Clear and Safety denies any current safety concerns including suicidal ideation, self-harm, and homicidal ideation  Thought Form:  Coherent  Logical     Insight:    Good     Plan:     Safety Plan: No current safety concerns identified.  Recommended that patient call 911 or go to the local ED should there be a change in any of these risk factors.     Barriers to treatment: None identified    Patient Contracts (see media tab):  None    Substance Use: Provided encouragement towards sobriety     Continue or Discharge: Patient will continue in Adult Partial Hospitalization Program (PHP)  as planned. Patient is likely to benefit from learning and using skills  as they work toward the goals identified in their treatment plan.      Claudette Mobley, [facility]  September 4, 2020

## 2020-09-04 NOTE — GROUP NOTE
Psychoeducation Group Note    PATIENT'S NAME: Cristel Grissom  MRN:   [id number]  :   2000  ACCT. NUMBER: [id number]  DATE OF SERVICE: 20  START TIME: 11:00 AM  END TIME: 11:50 AM  FACILITATOR: Evelyn Hawkins RN  TOPIC:  RN Group: Health Maintenance  Telemedicine Visit: The patient's condition can be safely assessed and treated via synchronous audio and visual telemedicine encounter.      Reason for Telemedicine Visit:  covid19    Originating Site (Patient Location): Patient's home    Distant Site (Provider Location): Provider Remote Setting    Consent:  The patient/guardian has verbally consented to: the potential risks and benefits of telemedicine (video visit) versus in person care; bill my insurance or make self-payment for services provided; and responsibility for payment of non-covered services.     Mode of Communication:  Video Conference via [employer]    As the provider I attest to compliance with applicable laws and regulations related to telemedicine.        Adult Partial Hospitalization Program  TRACK: 1    NUMBER OF PARTICIPANTS: 7    Summary of Group / Topics Discussed:  Health Maintenance: Weekend planning: Patients were given time to complete a weekend plan of what they will do to promote wellness and sobriety over the weekend when they do not have the structure of group. Patients were encouraged to review progress on their treatment goals and were challenged to identify ways to work toward meeting them. Patients identified and discussed foreseeable barriers to success over the weekend and then developed a plan to overcome them. Patients reviewed their distress coping skills and social support network and discussed this with the group.       Patient Session Goals / Objectives:    ?    Identified activities to engage in that promote balance in wellness  ?    Distinguished possible barriers to success over the weekend and created a plan to overcome them  ?    Listed distress coping  skills and identified social support network to utilize if in crisis during the weekend          Patient Participation / Response:  {OP MH PROGRESS NOTE PATIENT PARTICIPATION:722671}    {OP MH RN GROUP NOTE HEALTH MAINTENANCE:447467}    Treatment Plan:  Patient has {OP  PROGRAMMATIC TX PLAN STATUS:873095}    Evelyn Hawkins, RN

## 2020-09-04 NOTE — GROUP NOTE
Psychoeducation Group Note    PATIENT'S NAME: Cristel Grissom  MRN:   [id number]  :   2000  ACCT. NUMBER: [id number]  DATE OF SERVICE: 20  START TIME: 10:00 AM  END TIME: 10:50 AM  FACILITATOR: Priyank Prescott OT  TOPIC: [facility] OT Group: Lifestyle Balance and Structure  Adult Partial Hospitalization Program  TRACK: Group 2    Telemedicine Visit: The patient's condition can be safely assessed and treated via synchronous audio and visual telemedicine encounter.      Reason for Telemedicine Visit: Services only offered telehealth and Covid 19    Originating Site (Patient Location): Patient's home    Distant Site (Provider Location): Provider Remote Setting    Consent:  The patient/guardian has verbally consented to: the potential risks and benefits of telemedicine (video visit) versus in person care; bill my insurance or make self-payment for services provided; and responsibility for payment of non-covered services.     Mode of Communication:  Video Conference via [employer]    As the provider I attest to compliance with applicable laws and regulations related to telemedicine.      NUMBER OF PARTICIPANTS: 4    Summary of Group / Topics Discussed:  Lifestyle Balance and Structure:  Weaving a Mindful Lifestyle.      To demystify and help make mindfulness more accessible, patients were provided with education on the why, what & how, and ways to practice mindfulness through chosen activities using the metaphor of the components of a weaving (warp/weft). To improve self-efficacy with mindfulness, group members explored and applied the concept of neuro-sequential brain activation to help them identify what type of mindful activities might be most helpful depending on what part of the brain is dominating their attention (brainstem/midbrain/cerebellum, limbic, cortex). Facilitation of a structured experiential process where the group created a weaving of a variety of activities in each level that could be done  "\"mindfully\". These included: grounding activities, activities of daily living, informal sensorimotor activities, connecting activities, focused cognitive activities, and lastly formal meditative activities such as guided imagery or mindful movement such as yoga or yina chi. Patients shared their reflections and exchanged ideas and validation with each other.      Patient Session Goals / Objectives:    Learn about the why, what & how, and ways to create a mindful lifestyle to support mental health management.     Demystify the concept of mindfulness and apply curiosity and openness to practicing mindfulness in different ways.     Identify two activities that they can apply taught concepts to in the development of a mindful lifestyle      Patient Participation / Response:  Fully participated with the group by sharing personal reflections / insights and openly received / provided feedback with other participants.    Verbalized understanding of content and Patient would benefit from additional opportunities to practice the content to be able to generalize it to their everyday life with increased intentionality, consistency, and efficacy in support of their psychiatric recovery    Treatment Plan:  Patient has a current master individualized treatment plan.  See Epic treatment plan for more information.    Priyank Prescott, OT  "

## 2020-09-08 ENCOUNTER — HOSPITAL ENCOUNTER (OUTPATIENT)
Dept: BEHAVIORAL HEALTH | Facility: CLINIC | Age: 20
End: 2020-09-08
Attending: PSYCHIATRY & NEUROLOGY
Payer: COMMERCIAL

## 2020-09-08 PROCEDURE — H0035 MH PARTIAL HOSP TX UNDER 24H: HCPCS | Mod: GT

## 2020-09-08 PROCEDURE — H0035 MH PARTIAL HOSP TX UNDER 24H: HCPCS | Mod: GT | Performed by: OCCUPATIONAL THERAPIST

## 2020-09-08 NOTE — GROUP NOTE
Psychoeducation Group Note    PATIENT'S NAME: Cristel Grissom  MRN:   [id number]  :   2000  ACCT. NUMBER: [id number]  DATE OF SERVICE: 20  START TIME:  2:00 PM  END TIME:  2:50 PM  FACILITATOR: Rachelle Akins OTR/L  TOPIC: [facility] OT Group: Lifestyle Balance and Structure  Adult Partial Hospitalization Program  TRACK: 2    NUMBER OF PARTICIPANTS: 4    Telemedicine Visit: The patient's condition can be safely assessed and treated via synchronous audio and visual telemedicine encounter.      Reason for Telemedicine Visit: Services only offered telehealth    Originating Site (Patient Location): Patient's home    Distant Site (Provider Location): [facility]: [facility]    Consent:  The patient/guardian has verbally consented to: the potential risks and benefits of telemedicine (video visit) versus in person care; bill my insurance or make self-payment for services provided; and responsibility for payment of non-covered services.     Mode of Communication:  Video Conference via [employer]Stat    As the provider I attest to compliance with applicable laws and regulations related to telemedicine.      Summary of Group / Topics Discussed:  Lifestyle Balance and Structure:  OT Goal-setting & integration: To support progress towards treatment goals and psychiatric recovery, group members were led through a weekly structured process to reflect on progress, integrate new learning/skills, and emerging self-awareness into their daily and weekly life. Provided psychoeducation on the neuroscience of change, self-compassion, and the anatomy of a SMART goal to the group. Facilitated the sharing of individual goals with validation, support, and feedback provided.    Patient Session Goals / Objectives:    Reflected on and create a vision for recovery and wellbeing    Identified and wrote 3 SMART goals for the week    Identified and problem solved barriers to achieving identified goals     Identified plan  to support follow through on goals and reflection on progress made        Patient Participation / Response:  Fully participated with the group by sharing personal reflections / insights and openly received / provided feedback with other participants.    Patient presentation: easily engaged in positive reflection and noted benefits of practice of setting small goals to help manage mental health, Demonstrated understanding of content through practice of various skills and report on follow through , Verbalized understanding of content and Patient would benefit from additional opportunities to practice the content to be able to generalize it to their everyday life with increased intentionality, consistency, and efficacy in support of their psychiatric recovery    Treatment Plan:  Patient has a current master individualized treatment plan.  See Epic treatment plan for more information.    Rachelle Akins, OTR/L

## 2020-09-08 NOTE — PROGRESS NOTES
Pt reports that she missed morning groups, due to oversleeping. She plans on discharging from the program tomorrow.

## 2020-09-08 NOTE — GROUP NOTE
Psychotherapy Group Note    PATIENT'S NAME: Cristel Grissom  MRN:   [id number]  :   2000  ACCT. NUMBER: [id number]  DATE OF SERVICE: 20  START TIME:  1:00 PM  END TIME:  1:50 PM  FACILITATOR: Flores Maldonado LICSW  TOPIC:  EBP Group: Self-Awareness  Adult Partial Hospitalization Program  TRACK: 2    Telemedicine Visit: The patient's condition can be safely assessed and treated via synchronous audio and visual telemedicine encounter.      Reason for Telemedicine Visit: Services only offered telehealth    Originating Site (Patient Location): Patient's home    Distant Site (Provider Location): [facility]: [facility]    Consent:  The patient/guardian has verbally consented to: the potential risks and benefits of telemedicine (video visit) versus in person care; bill my insurance or make self-payment for services provided; and responsibility for payment of non-covered services.     Mode of Communication:  Video Conference via [employer]    As the provider I attest to compliance with applicable laws and regulations related to telemedicine.      NUMBER OF PARTICIPANTS: 4    Summary of Group / Topics Discussed:  Self-Awareness: Grief: Patients were provided with an overview of how personal losses impact their thoughts, feelings, and behaviors. Different stages of grief were discussed, with a focus on the personal and individual experiences of grief as a natural response to loss. The relationship between grief, depression, and anxiety was also discussed. Patients were provided with information regarding different ways of processing grief and shared their personal experiences.     Patient Session Goals / Objectives:    Defined and explored the concept of grief and the grieving process    Discussed relationship between grief, depression, and anxiety     Normalized and recognized the purpose/benefits of the grieving process    Discussed management of the thoughts and feelings associated with  grief      Patient Participation / Response:  Fully participated with the group by sharing personal reflections / insights and openly received / provided feedback with other participants.    Demonstrated understanding of topics discussed through group discussion and participation    Treatment Plan:  Patient has a current master individualized treatment plan.  See Epic treatment plan for more information.    Flores Newman, MAIKOLSW

## 2020-09-09 ENCOUNTER — HOSPITAL ENCOUNTER (OUTPATIENT)
Dept: BEHAVIORAL HEALTH | Facility: CLINIC | Age: 20
End: 2020-09-09
Attending: PSYCHIATRY & NEUROLOGY
Payer: COMMERCIAL

## 2020-09-09 ENCOUNTER — TELEPHONE (OUTPATIENT)
Dept: BEHAVIORAL HEALTH | Facility: CLINIC | Age: 20
End: 2020-09-09

## 2020-09-09 PROCEDURE — H0035 MH PARTIAL HOSP TX UNDER 24H: HCPCS | Mod: GT | Performed by: COUNSELOR

## 2020-09-09 PROCEDURE — H0035 MH PARTIAL HOSP TX UNDER 24H: HCPCS | Mod: 95 | Performed by: SOCIAL WORKER

## 2020-09-09 PROCEDURE — H0035 MH PARTIAL HOSP TX UNDER 24H: HCPCS | Mod: GT | Performed by: OCCUPATIONAL THERAPIST

## 2020-09-09 PROCEDURE — H0035 MH PARTIAL HOSP TX UNDER 24H: HCPCS | Mod: GT

## 2020-09-09 PROCEDURE — H0035 MH PARTIAL HOSP TX UNDER 24H: HCPCS | Mod: 95

## 2020-09-09 NOTE — PROGRESS NOTES
[facility]   Dr. Thomas's Psychiatric Progress Note  2020      Patient:  Cristel Grissom   Medical Record Number:  [id number]  :  2000          Interim History:   The patient's care was discussed with the treatment team and chart notes were reviewed.  The long weekend went ok.  Mood has been better.  Uses hydroxyzine which helps anxiety.      Psychiatric ROS:  Mood: better;  No mood swings;  Still anxious;   Sleep:   Can sleep all night just fine, even skipping HS hydroxyzine;   Appetite:  Moderate; struggles to actually make food;    Eating:   Less than normal;  Energy Level:  Good but worse on the gloomy days; takes Vit D in winter.   Concentration/Memory:  A little better  Suicidal Thoughts:No  Homicidal Thoughts:No  Psychotic Symptoms: No  Medication Side Effects:  Anticholinergic SE are some less.  Constipation;   Eyes hurt;  Less blurred vision;     Medication Compliance:Yes   Physical Complaints:negative         Medications:     PAST PSYCH MEDS:  Zoloft, Paxil, Abilify, Seroquel, propranolol, Cogentin, Vistaril, Neurontin, melatonin    Current Outpatient Medications   Medication Sig     .  Neurontin 200 mg 3 times daily as needed for anxiety. (rarely)    Hydroxyzine 25 to 50 mg 4 times daily. (try not to use)    Melatonin 3 mg at bedtime as needed.     Propranolol 10 mg 3 times daily. (may get rid of that next week)   Seroquel 200 mg at bedtime. (cut back to 100mg)   Seroquel 50 four times daily as needed for anxiety. (stop using)   MiraLax 17 grams daily as needed.       No current facility-administered medications for this encounter.              Allergies:     Allergies   Allergen Reactions     Bactrim [Sulfamethoxazole W/Trimethoprim]      When very young, vomiting likely per mom     Cefzil [Cefprozil]      When very young, vomiting likely per mom     Penicillins      When very young, vomiting likely per mom     Sulfa Drugs      When very young, vomiting  likely per mom            Psychiatric Examination:   There were no vitals taken for this visit.  Weight is 0 lbs 0 oz  There is no height or weight on file to calculate BMI.    Appearance:  awake, alert and adequately groomed  Attitude:  cooperative  Eye Contact:  good  Mood:  Less swings;  anxious  Affect:  mood congruent  Speech:  clear, coherent  Psychomotor Behavior:  no evidence of tardive dyskinesia, dystonia, or tics  Throught Process:  logical  Associations:  no loose associations  Thought Content:  no evidence of suicidal ideation or homicidal ideation and no evidence of psychotic thought  Insight:  good  Judgement:  intact  Oriented to:  time, person, and place  Attention Span and Concentration:  intact  Recent and Remote Memory:  intact  Gait:Normal    Risk/Potential for Dangerousness:  Multiple Active Diagnoses:HIGH  Self Care:HIGH  Suicide:LOW  Assault:LOW  Self Injurious Behaviors:LOW  Inappropriate Sexual Behavior:LOW         Labs:   No results found for this or any previous visit (from the past 24 hour(s)).     Recent Results (from the past 1008 hour(s))   Urine Drugs of Abuse Screen Panel 13    Collection Time: 08/02/20  2:17 AM   Result Value Ref Range    Cannabinoids (23-enr-1-carboxy-9-THC) Not Detected NDET^Not Detected ng/mL    Phencyclidine (Phencyclidine) Not Detected NDET^Not Detected ng/mL    Cocaine (Benzoylecgonine) Not Detected NDET^Not Detected ng/mL    Methamphetamine (d-Methamphetamine) Not Detected NDET^Not Detected ng/mL    Opiates (Morphine) Not Detected NDET^Not Detected ng/mL    Amphetamine (d-Amphetamine) Not Detected NDET^Not Detected ng/mL    Benzodiazepines (Nordiazepam) Not Detected NDET^Not Detected ng/mL    Tricyclic Antidepressants (Desipramine) Not Detected NDET^Not Detected ng/mL    Methadone (Methadone) Not Detected NDET^Not Detected ng/mL    Barbiturates (Butalbital) Not Detected NDET^Not Detected ng/mL    Oxycodone (Oxycodone) Not Detected NDET^Not Detected ng/mL     Propoxyphene (Norpropoxyphene) Not Detected NDET^Not Detected ng/mL    Buprenorphine (Buprenorphine) Not Detected NDET^Not Detected ng/mL   HCG qualitative urine (UPT)    Collection Time: 08/02/20  2:17 AM   Result Value Ref Range    HCG Qual Urine Negative NEG^Negative   Symptomatic COVID-19 Virus (Coronavirus) by PCR    Collection Time: 08/02/20  6:30 AM    Specimen: Nasopharyngeal   Result Value Ref Range    COVID-19 Virus PCR to [facility] - Source Nares     COVID-19 Virus PCR to [facility] - Result       Test received-See reflex to IDDL test SARS CoV2 (COVID-19) Virus RT-PCR   SARS-CoV-2 COVID-19 Virus (Coronavirus) RT-PCR Nasopharyngeal    Collection Time: 08/02/20  6:30 AM    Specimen: Nasopharyngeal   Result Value Ref Range    SARS-CoV-2 Virus Specimen Source Nares     SARS-CoV-2 PCR Result NEGATIVE     SARS-CoV-2 PCR Comment       Testing was performed using the Xpert Xpress SARS-CoV-2 Assay on the Cepheid Gene-Xpert   Instrument Systems. Additional information about this Emergency Use Authorization (EUA)   assay can be found via the Lab Guide.     Potassium    Collection Time: 08/06/20 12:00 AM   Result Value Ref Range    Potassium 4.2 3.5 - 5.0 mmol/L   Glucose    Collection Time: 08/06/20 12:00 AM   Result Value Ref Range    Glucose 83 70 - 125 mg/dL   Creatinine    Collection Time: 08/06/20 12:00 AM   Result Value Ref Range    Creatinine 0.68 0.60 - 1.10 mg/dL    GFR Estimate >60 >60 ml/min/1.73m2    GFR Estimate If Black >60 >60 ml/min/1.73m2   AST    Collection Time: 08/06/20 12:00 AM   Result Value Ref Range    AST 9 0 - 40 U/L   ALT    Collection Time: 08/06/20 12:00 AM   Result Value Ref Range    ALT <9 0 - 45 U/L         Impression:   This is a 20 year old female continues PHP for mood stabilization.  She's having a lot of anticholinergic SE from meds, even off Cogentin.          DIagnoses:     Axis I:  Major depressive disorder, recurrent.  Generalized anxiety disorder.  Posttraumatic stress disorder.    Axis II:  Deferred.   Axis III:  No diagnosis.          Plan:     Complete [facility] Hospital Program today.   Continue current medications, which were just started at [facility].   Stopped Cogentin  Try not to take Hydroxyzine;  Stop the prn doses of Seroquel.    Cut Seroquel HS dose from 200mg to 100mg   Started Lamictal 25mg/d and titrate by 25mg/week until reaching target dose of 200 mg/d.  Coborns in [place].    Expect stabilization and completion of Partial Hospital Program.     Emmanuel Thomas MD

## 2020-09-09 NOTE — GROUP NOTE
Psychoeducation Group Note    PATIENT'S NAME: Cristel Grissom  MRN:   [id number]  :   2000  ACCT. NUMBER: [id number]  DATE OF SERVICE: 20  START TIME: 11:00 AM  END TIME: 11:50 AM  FACILITATOR: Italia Anthony RN  TOPIC: [facility] RN Group: Mental Health Maintenance  Adult Partial Hospitalization Program  TRACK: 1    NUMBER OF PARTICIPANTS: 6    Summary of Group / Topics Discussed:  Mental Health Maintenance:  Assessments of Strengths/ Self -compassion. Patients viewed and discussed a 16 minute video by Kayla Saba .The concept of personal strength as it relates to self-compassion were explored. Patients shared responses with the group and participated in discussion.     Patient Session Goals / Objectives:  ? Patients identified 1-3 components of self compassion.  ? Patients completed a brief self compassion exercise using the core concepts  Telemedicine Visit: The patient's condition can be safely assessed and treated via synchronous audio and visual telemedicine encounter.      Reason for Telemedicine Visit: Services only offered telehealth    Originating Site (Patient Location): Patient's home    Distant Site (Provider Location): Provider Remote Setting    Consent:  The patient/guardian has verbally consented to: the potential risks and benefits of telemedicine (video visit) versus in person care; bill my insurance or make self-payment for services provided; and responsibility for payment of non-covered services.     Mode of Communication:  Video Conference via [employer]    As the provider I attest to compliance with applicable laws and regulations related to telemedicine.         Patient Participation / Response:  Moderately participated, sharing some personal reflections / insights and adequately adequately received / provided feedback with other participants.    Demonstrated understanding of topics discussed through group discussion and participation    Treatment Plan:  Patient has a current master  individualized treatment plan.  See Epic treatment plan for more information.    Italia Anthony RN

## 2020-09-09 NOTE — GROUP NOTE
Psychoeducation Group Note    PATIENT'S NAME: Cristel Grissom  MRN:   [id number]  :   2000  ACCT. NUMBER: [id number]  DATE OF SERVICE: 20  START TIME:  1:00 PM  END TIME:  1:50 PM  FACILITATOR: Priyank Prescott OT  TOPIC: [facility] OT Group: Self- Regulation Skills  Adult Partial Hospitalization Program  TRACK: 1    NUMBER OF PARTICIPANTS: 6    Telemedicine Visit: The patient's condition can be safely assessed and treated via synchronous audio and visual telemedicine encounter.      Reason for Telemedicine Visit: Services only offered telehealth and Covid 19    Originating Site (Patient Location): Patient's home    Distant Site (Provider Location): Provider Remote Setting    Consent:  The patient/guardian has verbally consented to: the potential risks and benefits of telemedicine (video visit) versus in person care; bill my insurance or make self-payment for services provided; and responsibility for payment of non-covered services.     Mode of Communication:  Video Conference via [employer]    As the provider I attest to compliance with applicable laws and regulations related to telemedicine.      Summary of Group / Topics Discussed:  Self-Regulation Skills: Sensory Modulation: Patients were provided with education on Autonomic Nervous System activation and the importance of identifying their Window of Tolerance for effective self-regulation.  Patients were taught how to recognize specific signs and symptoms of their individualized state of arousal and how to make changes when needed.  Patient's explored body based skills (bottom up processing skills) and techniques to help manage symptoms and change level of arousal when needed to be in control and comfortable so they are able to function in different environments.         Patient Session Goals / Objectives:    [place] how the ANS works and importance of its  impact on functioning and mental wellbeing    [place] how developing interoceptive awareness can  help one self-regulate sooner rather than later    Identified signs and symptoms of current level of arousal and ways to make changes in level of arousal when needed    Identified specific and individualized neurosensory skills to help when distressed and for crisis management    Established a plan for practice of these skills in their own environments        Patient Participation / Response:  Fully participated with the group by sharing personal reflections / insights and openly received / provided feedback with other participants.    Verbalized understanding of content and Patient would benefit from additional opportunities to practice the content to be able to generalize it to their everyday life with increased intentionality, consistency, and efficacy in support of their psychiatric recovery    Treatment Plan:  Patient has a current master individualized treatment plan.  See Epic treatment plan for more information.    Priyank Prescott, OT

## 2020-09-09 NOTE — GROUP NOTE
"Process Group Note    PATIENT'S NAME: Cristel Grissom  MRN:   [id number]  :   2000  ACCT. NUMBER: [id number]  DATE OF SERVICE: 20  START TIME:  9:00 AM  END TIME:  9:50 AM  FACILITATOR: Claudette Mobley LPCC  TOPIC:  Process Group    Diagnoses:  Major depressive disorder, recurrent.  Generalized anxiety disorder.  Posttraumatic stress disorder.     Adult Partial Hospitalization Program  TRACK: [facility]1    NUMBER OF PARTICIPANTS: 6    Telemedicine Visit: The patient's condition can be safely assessed and treated via synchronous audio and visual telemedicine encounter.      Reason for Telemedicine Visit: Services only offered telehealth and due to COVID-19    Originating Site (Patient Location): Patient's home    Distant Site (Provider Location): Provider Remote Setting    Consent:  The patient/guardian has verbally consented to: the potential risks and benefits of telemedicine (video visit) versus in person care; bill my insurance or make self-payment for services provided; and responsibility for payment of non-covered services.     Mode of Communication:  Video Conference via [employer]    As the provider I attest to compliance with applicable laws and regulations related to telemedicine.            Data:    Session content: At the start of this group, patients were invited to check in by identifying themselves, describing their current emotional status, and identifying issues to address in this group.   Area(s) of treatment focus addressed in this session included Symptom Management, Personal Safety and Community Resources/Discharge Planning.    Patient reported feeling excited to be discharging from the [facility]. Patient reported she plans to contact an individual therapist this afternoon. Patient reported struggling with following through on tasks and goal planned to make a \"to-do\"list.     Therapeutic Interventions/Treatment Strategies:  Psychotherapist offered support, feedback and validation and " reinforced use of skills. Treatment modalities used include Motivational Interviewing and Cognitive Behavioral Therapy. Interventions include Coping Skills: Discussed use of self-soothe skills to decrease distress in the body and Assisted patient in identifying 1-2 healthy distraction skills to reduce overall distress, Mindfulness: Facilitated discussion of when/how to use mindfulness skills to benefit general health, mental health symptoms, and stressors and Symptoms Management: Promoted understanding of their diagnoses and how it impacts their functioning.    Assessment:    Patient response:   Patient responded to session by accepting feedback, listening and focusing on goals    Possible barriers to participation / learning include: and no barriers identified    Health Issues:   None reported       Substance Use Review:   Substance Use: No active concerns identified.    Mental Status/Behavioral Observations  Appearance:   Appropriate   Eye Contact:   Good   Psychomotor Behavior: Normal   Attitude:   Cooperative   Orientation:   All  Speech   Rate / Production: Normal/ Responsive Normal    Volume:  Normal   Mood:    Anxious  Normal  Affect:    Appropriate   Thought Content:   Clear and Safety denies any current safety concerns including suicidal ideation, self-harm, and homicidal ideation  Thought Form:  Coherent  Logical     Insight:    Good     Plan:     Safety Plan: No current safety concerns identified.  Recommended that patient call 911 or go to the local ED should there be a change in any of these risk factors.     Barriers to treatment: None identified    Patient Contracts (see media tab):  None    Substance Use: Provided encouragement towards sobriety     Continue or Discharge: Patient is being discharged today. See Treatment Plan and Discharge Summary.       Claudette Mobley, [facility]  September 9, 2020

## 2020-09-09 NOTE — GROUP NOTE
Psychoeducation Group Note    PATIENT'S NAME: Cristel Grissom  MRN:   [id number]  :   2000  ACCT. NUMBER: [id number]  DATE OF SERVICE: 20  START TIME: 10:00 AM  END TIME: 10:50 AM  FACILITATOR: Katya Hernandez OTR/L  TOPIC: [facility] OT Group: Lifestyle Balance and Structure  Adult Partial Hospitalization Program  TRACK: 1    NUMBER OF PARTICIPANTS: 6    Telemedicine Visit: The patient's condition can be safely assessed and treated via synchronous audio and visual telemedicine encounter.      Reason for Telemedicine Visit: Services only offered telehealth    Originating Site (Patient Location): Patient's home    Distant Site (Provider Location): [facility] Outpatient Setting: Partial [facility]    Consent:  The patient/guardian has verbally consented to: the potential risks and benefits of telemedicine (video visit) versus in person care; bill my insurance or make self-payment for services provided; and responsibility for payment of non-covered services.     Mode of Communication:  Video Conference via [employer]    As the provider I attest to compliance with applicable laws and regulations related to telemedicine.     Summary of Group / Topics Discussed:  Lifestyle Balance and Structure:  Leisure Values: Provided psychoeducation and discussion on benefits of leisure on stress management, parasympathetic NS activation, and wellness. Facilitated a structured self-reflective process where patients identified their leisure values: what is most important to them with regards to how they spend their time and energy on that promotes lifestyle balance to support mental wellbeing.  Facilitated discussion were patients reflected on past, present, and potential future leisure activities that fulfill their leisure values. Validation and support provided.    Patient Session Goals / Objectives:    [place] the mechanisms and benefits of leisure activity to create lifestyle balance and improve mental  health.     Identified their leisure values (how they want to spend their time and energy)    Identified past, present, and future leisure activities that demonstrate a connection to their leisure values    Identify first step to engaging in a new or old leisure activity again and problem solve barriers.         Patient Participation / Response:  Minimally participated, only when prompted / asked.    Patient presentation: Congruent affect; mood seemed even; quiet in group but appeared to be listening as observed through head nodding and Patient would benefit from additional opportunities to practice the content to be able to generalize it to their everyday life with increased intentionality, consistency, and efficacy in support of their psychiatric recovery    Treatment Plan:  Patient has See Epic Treatment Plan - Patient is discharging.  Today is Cristel's last day in the program. See discharge instruction sheet for more details.     Katya Hernandez, OTR/L

## 2020-09-09 NOTE — GROUP NOTE
Psychotherapy Group Note    PATIENT'S NAME: Cristel Grissom  MRN:   [id number]  :   2000  ACCT. NUMBER: [id number]  DATE OF SERVICE: 20  START TIME:  2:00 PM  END TIME:  2:50 PM  FACILITATOR: Jasmin Ayoub LICSW  TOPIC:  EBP Group: Enhanced Mindfulness  Adult Partial Hospitalization Program  TRACK: 1    NUMBER OF PARTICIPANTS: 4    Summary of Group / Topics Discussed:  Enhanced Mindfulness: Body and Mind Integration: Patients received an overview and education regarding the importance of including the body in the management of emotional health and self-care and as a direct route to mindfulness practice.  Patients discussed various ways in which the body can serve as an informant to their physical and emotional experiences/need. Patients discussed the body as a direct link to the present moment and to mindfulness practice.  Patients discussed current relationship with body, self-awareness, mindfulness practice and barriers to connection with body.  Patients were guided through breath work and movement to facilitate greater self-awareness, grounding, self-expression, and connection to other.  Patients discussed how the experiential could be applied to better manage mental health and develop turcios connection to self.    Patient Session Goals / Objectives:    Identify how movement awareness could be used for grounding, stress management, self-expression, connection to other and self-regulation    Improved awareness of breath and movement preferences    Identify how movement and the body is used in mindfulness practice    Reflect on use of these practices in everyday life.    Identify barriers to attending to body    Telemedicine Visit: The patient's condition can be safely assessed and treated via synchronous audio and visual telemedicine encounter.          Reason for Telemedicine Visit: Services only offered telehealth and covid19        Originating Site (Patient Location): Patient's  home        Distant Site (Provider Location): Provider Remote Setting        Consent:  The patient/guardian has verbally consented to: the potential risks and benefits of telemedicine (video visit) versus in person care; bill my insurance or make self-payment for services provided; and responsibility for payment of non-covered services.         Mode of Communication:  Video Conference via [employer]        As the provider I attest to compliance with applicable laws and regulations related to telemedicine.         Patient Participation / Response:  Fully participated with the group by sharing personal reflections / insights and openly received / provided feedback with other participants.    Demonstrated understanding of topics discussed through group discussion and participation and Practiced skills in session    Treatment Plan:  Patient has See Epic Treatment Plan - Patient is discharging.    MAIKOL VargasSW

## 2020-09-09 NOTE — TELEPHONE ENCOUNTER
Writer contacted patient to inquire about her decision to return to work or attend day treatment; writer left a voicemail with call back information: [phone]. Writer will check-in with patient during the first group of the day if patient does not return the call prior to that.     Claudette Mobley [facility] on 9/9/2020 at 8:14 AM

## 2020-09-10 NOTE — ADDENDUM NOTE
Encounter addended by: Swapna Betancourt, LICSW on: 9/10/2020 8:01 AM   Actions taken: Charge Capture section accepted

## 2020-09-13 ENCOUNTER — MYC MEDICAL ADVICE (OUTPATIENT)
Dept: FAMILY MEDICINE | Facility: OTHER | Age: 20
End: 2020-09-13

## 2020-09-15 NOTE — TELEPHONE ENCOUNTER
Please have patient schedule appointment to discuss alternatives to hydroxyzine. Prefer office visit but if wants virtual that would be okay.  Pamella Robles, CNP

## 2020-09-18 NOTE — PROGRESS NOTES
"Subjective     Cristel Grissom is a 20 year old female who presents to clinic today for the following health issues:    HPI   [facility] once weekly. [facility] for psychiatrist. Dr. Smith in [place]. Is hoping she will have a good connection with her. Appointment with therapist tomorrow.   Eye issues - eye doctor thought that it was the hydroxyzine. Can't wear contacts. Glasses has to have on for far and off for near vision. Light hurts her eyes.   Lamotrigine - started 9/4 and decreased Seroquel, building up dose of lamotrigine but has seen a significant change in mood as the dose has gone up. Is currently at 50 mg and \"mood is everywhere\".   Seroquel went from 200 down to 100 mg at night. Seroquel 50 mg qid additional PRN anxiety. Was thinking seroquel was causing eye issues but is not sure.   She decreased hydroxyzine down to 25 mg since eye doctor said may be cause of eye problems.  Gabapentin 100 mg capsules (200 mg) seems to work within 15 minutes. Has to put sprinkles in applesauce.       Review of Systems   Constitutional, HEENT, cardiovascular, pulmonary, gi and gu systems are negative, except as otherwise noted.      Objective    /68   Pulse 86   Temp 97.8  F (36.6  C) (Temporal)   Resp 14   Ht 1.586 m (5' 2.44\")   Wt 59.9 kg (132 lb)   SpO2 98%   BMI 23.80 kg/m    Body mass index is 23.8 kg/m .  Physical Exam   GENERAL APPEARANCE: alert and mild anxious distress  PSYCH: mentation appears normal, affect flat, anxious and worried    No results found for this or any previous visit (from the past 24 hour(s)).        Assessment & Plan     Depression with anxiety  Refilled medications that she has been on per psychiatrist at hospital. She has SI but denies intent or plan to harm herself and feels safe in her environment. She agrees to tell mom if suicidal thoughts worsen. She is seeing therapist tomorrow and upcoming appointment to establish care with new psychiatrist. May benefit from a " setting where she can live and do therapy for an extended period of time.   - propranolol (INDERAL) 10 MG tablet; Take 1 tablet (10 mg) by mouth 3 times daily  - QUEtiapine (SEROQUEL) 200 MG tablet; Take 1 tablet (200 mg) by mouth At Bedtime       No follow-ups on file.    ASHELY Collins [facility]    Answers for HPI/ROS submitted by the patient on 9/21/2020   Chronic problems general questions HPI Form  If you checked off any problems, how difficult have these problems made it for you to do your work, take care of things at home, or get along with other people?: Extremely difficult  PHQ9 TOTAL SCORE: 23  SUSU 7 TOTAL SCORE: 21

## 2020-09-21 ENCOUNTER — OFFICE VISIT (OUTPATIENT)
Dept: FAMILY MEDICINE | Facility: OTHER | Age: 20
End: 2020-09-21
Payer: COMMERCIAL

## 2020-09-21 VITALS
WEIGHT: 132 LBS | HEART RATE: 86 BPM | TEMPERATURE: 97.8 F | DIASTOLIC BLOOD PRESSURE: 68 MMHG | RESPIRATION RATE: 14 BRPM | OXYGEN SATURATION: 98 % | BODY MASS INDEX: 24.29 KG/M2 | HEIGHT: 62 IN | SYSTOLIC BLOOD PRESSURE: 118 MMHG

## 2020-09-21 DIAGNOSIS — F41.8 DEPRESSION WITH ANXIETY: Primary | ICD-10-CM

## 2020-09-21 PROCEDURE — 99213 OFFICE O/P EST LOW 20 MIN: CPT | Performed by: STUDENT IN AN ORGANIZED HEALTH CARE EDUCATION/TRAINING PROGRAM

## 2020-09-21 RX ORDER — PROPRANOLOL HYDROCHLORIDE 10 MG/1
10 TABLET ORAL 3 TIMES DAILY
Qty: 90 TABLET | Refills: 1 | Status: SHIPPED | OUTPATIENT
Start: 2020-09-21 | End: 2020-12-31 | Stop reason: ALTCHOICE

## 2020-09-21 RX ORDER — QUETIAPINE FUMARATE 200 MG/1
200 TABLET, FILM COATED ORAL AT BEDTIME
Qty: 30 TABLET | Refills: 1 | Status: SHIPPED | OUTPATIENT
Start: 2020-09-21 | End: 2020-12-31 | Stop reason: ALTCHOICE

## 2020-09-21 ASSESSMENT — ANXIETY QUESTIONNAIRES
2. NOT BEING ABLE TO STOP OR CONTROL WORRYING: NEARLY EVERY DAY
7. FEELING AFRAID AS IF SOMETHING AWFUL MIGHT HAPPEN: NEARLY EVERY DAY
1. FEELING NERVOUS, ANXIOUS, OR ON EDGE: NEARLY EVERY DAY
5. BEING SO RESTLESS THAT IT IS HARD TO SIT STILL: NEARLY EVERY DAY
7. FEELING AFRAID AS IF SOMETHING AWFUL MIGHT HAPPEN: NEARLY EVERY DAY
3. WORRYING TOO MUCH ABOUT DIFFERENT THINGS: NEARLY EVERY DAY
GAD7 TOTAL SCORE: 21
4. TROUBLE RELAXING: NEARLY EVERY DAY
6. BECOMING EASILY ANNOYED OR IRRITABLE: NEARLY EVERY DAY
GAD7 TOTAL SCORE: 21
GAD7 TOTAL SCORE: 21

## 2020-09-21 ASSESSMENT — PATIENT HEALTH QUESTIONNAIRE - PHQ9
SUM OF ALL RESPONSES TO PHQ QUESTIONS 1-9: 23
10. IF YOU CHECKED OFF ANY PROBLEMS, HOW DIFFICULT HAVE THESE PROBLEMS MADE IT FOR YOU TO DO YOUR WORK, TAKE CARE OF THINGS AT HOME, OR GET ALONG WITH OTHER PEOPLE: EXTREMELY DIFFICULT
SUM OF ALL RESPONSES TO PHQ QUESTIONS 1-9: 23

## 2020-09-21 ASSESSMENT — MIFFLIN-ST. JEOR: SCORE: 1323.99

## 2020-09-21 NOTE — PATIENT INSTRUCTIONS
Decrease lamotrigine to 25 mg once daily for one week then stop.     Increase dose of Seroquel to 200 mg when you are done with lamotrigine.    Think about taking gabapentin 100 mg three times daily for anxiety. If you start this do not stop abruptly.     I will call our mental health coordinator and find out more about options.    Pamella Robles, CNP

## 2020-09-22 ASSESSMENT — PATIENT HEALTH QUESTIONNAIRE - PHQ9: SUM OF ALL RESPONSES TO PHQ QUESTIONS 1-9: 23

## 2020-09-22 ASSESSMENT — ANXIETY QUESTIONNAIRES: GAD7 TOTAL SCORE: 21

## 2020-12-20 ENCOUNTER — HEALTH MAINTENANCE LETTER (OUTPATIENT)
Age: 20
End: 2020-12-20

## 2020-12-28 NOTE — PROGRESS NOTES
Subjective     Cristel Grissom is a 20 year old female who presents to clinic today for the following health issues:    HPI         Chest Pain  Onset/Duration:   Description:   Location: entire chest- middle  Character: tight at times and feels heart pounding hard  Radiation: none  Duration: is not sure, stops once she is able to calm herself   Intensity: moderate  Progression of Symptoms: worsening  Accompanying Signs & Symptoms:  Shortness of breath: no  Sweating: no  Nausea/vomiting: YES  Lightheadedness: YES, sometimes when the episodes occur but she is not sure if she is truly lightheaded or if it is because she is anxious  Palpitations: YES- racing, pounding heart rate  Fever/Chills: no  Cough: no           Heartburn: no  History:   Family history of heart disease: YES- not immediate family  Tobacco use: no  Previous similar symptoms: no   Precipitating factors:   Worse with exertion: no  Worse with deep breaths: no           Related to eating: no           Better with burping: no  Alleviating factors: calming down  Therapies tried and outcome: calming techniques  Propranolol tid and decreasing by one per week for 3 weeks. Last dose propranolol was Saturday.  Sertraline restarted  Hot, chest starts to hurt, nauseous, lightheaded  Gabapentin 200 at noon, 100 at 6 pm, 200 at 9 pm  Latuda 20 mg at supper and hydroxyzine 25 mg at supper  Hydroxyzine 25 mg as needed          Review of Systems   Constitutional, HEENT, cardiovascular, pulmonary, gi and gu systems are negative, except as otherwise noted.      Objective    /70 (BP Location: Right arm, Patient Position: Chair, Cuff Size: Adult Regular)   Pulse 98   Temp 98  F (36.7  C) (Temporal)   Resp 16   Wt 66.2 kg (146 lb)   SpO2 100%   BMI 26.33 kg/m    Body mass index is 26.33 kg/m .  Physical Exam   GENERAL: alert and anxious distress  NECK: no adenopathy, no asymmetry, masses, or scars and thyroid normal to palpation  RESP: lungs clear to  auscultation - no rales, rhonchi or wheezes  CV: regular rate and rhythm, normal S1 S2, no S3 or S4, no murmur, click or rub  ABDOMEN: soft, nontender, no hepatosplenomegaly, no masses and bowel sounds normal  MS: no gross musculoskeletal defects noted, no edema  SKIN: no suspicious lesions or rashes  NEURO: Normal strength and tone, mentation intact and speech normal  PSYCH: mentation appears normal, tearful, anxious, judgement and insight intact and appearance well groomed    No results found for this or any previous visit (from the past 24 hour(s)).        Assessment & Plan     Racing heart beat   Anxiety  Suspect these episodes are related to anxiety. Recently tapered off of propranolol so it could be that her heart rate was lower with the beta blocker and she is feeling the effects from being off of it. Recommended Holter monitor to capture heart activity during the episodes. We discussed if the Holter is normal that she could talk to her psychiatrist about going back on propranolol.   - Holter Monitor 48 hour Adult Pediatric; Future        No follow-ups on file.    Greater than 50% of 30 minute visit were spent on counseling or coordination of care regarding racing heart rate, anxiety.      Cristina Robles, ASHELY [facility]

## 2020-12-30 ENCOUNTER — OFFICE VISIT (OUTPATIENT)
Dept: FAMILY MEDICINE | Facility: OTHER | Age: 20
End: 2020-12-30
Payer: COMMERCIAL

## 2020-12-30 VITALS
WEIGHT: 146 LBS | SYSTOLIC BLOOD PRESSURE: 126 MMHG | BODY MASS INDEX: 26.33 KG/M2 | OXYGEN SATURATION: 100 % | HEART RATE: 98 BPM | TEMPERATURE: 98 F | DIASTOLIC BLOOD PRESSURE: 70 MMHG | RESPIRATION RATE: 16 BRPM

## 2020-12-30 DIAGNOSIS — R00.0 RACING HEART BEAT: Primary | ICD-10-CM

## 2020-12-30 DIAGNOSIS — F41.9 ANXIETY: ICD-10-CM

## 2020-12-30 PROCEDURE — 99214 OFFICE O/P EST MOD 30 MIN: CPT | Performed by: STUDENT IN AN ORGANIZED HEALTH CARE EDUCATION/TRAINING PROGRAM

## 2020-12-30 RX ORDER — LURASIDONE HYDROCHLORIDE 20 MG/1
TABLET, FILM COATED ORAL
COMMUNITY
End: 2021-03-10

## 2020-12-31 ENCOUNTER — HOSPITAL ENCOUNTER (OUTPATIENT)
Dept: CARDIOLOGY | Facility: CLINIC | Age: 20
Discharge: HOME | End: 2020-12-31
Attending: STUDENT IN AN ORGANIZED HEALTH CARE EDUCATION/TRAINING PROGRAM | Admitting: STUDENT IN AN ORGANIZED HEALTH CARE EDUCATION/TRAINING PROGRAM
Payer: COMMERCIAL

## 2020-12-31 DIAGNOSIS — R00.0 RACING HEART BEAT: ICD-10-CM

## 2020-12-31 PROCEDURE — 93227 XTRNL ECG REC<48 HR R&I: CPT | Performed by: INTERNAL MEDICINE

## 2020-12-31 PROCEDURE — 93225 XTRNL ECG REC<48 HRS REC: CPT

## 2020-12-31 RX ORDER — QUETIAPINE FUMARATE 50 MG/1
50 TABLET, FILM COATED ORAL DAILY
COMMUNITY
Start: 2020-12-31 | End: 2020-12-31

## 2020-12-31 RX ORDER — GABAPENTIN 100 MG/1
CAPSULE ORAL DAILY
COMMUNITY
Start: 2020-12-31 | End: 2021-10-08

## 2021-03-10 ENCOUNTER — OFFICE VISIT (OUTPATIENT)
Dept: FAMILY MEDICINE | Facility: OTHER | Age: 21
End: 2021-03-10
Payer: COMMERCIAL

## 2021-03-10 VITALS
SYSTOLIC BLOOD PRESSURE: 114 MMHG | OXYGEN SATURATION: 99 % | WEIGHT: 146 LBS | DIASTOLIC BLOOD PRESSURE: 82 MMHG | BODY MASS INDEX: 26.33 KG/M2 | TEMPERATURE: 98.1 F | RESPIRATION RATE: 14 BRPM | HEART RATE: 77 BPM

## 2021-03-10 DIAGNOSIS — R42 LIGHTHEADEDNESS: ICD-10-CM

## 2021-03-10 DIAGNOSIS — F33.2 SEVERE EPISODE OF RECURRENT MAJOR DEPRESSIVE DISORDER, WITHOUT PSYCHOTIC FEATURES (H): ICD-10-CM

## 2021-03-10 DIAGNOSIS — R23.2 FLUSHING REACTION: Primary | ICD-10-CM

## 2021-03-10 PROCEDURE — 99214 OFFICE O/P EST MOD 30 MIN: CPT | Performed by: PHYSICIAN ASSISTANT

## 2021-03-10 RX ORDER — PROPRANOLOL HYDROCHLORIDE 10 MG/1
TABLET ORAL
COMMUNITY
Start: 2021-02-24 | End: 2021-10-08

## 2021-03-10 RX ORDER — HYDROXYZINE HYDROCHLORIDE 25 MG/1
TABLET, FILM COATED ORAL
Qty: 240 TABLET | Refills: 0 | COMMUNITY
Start: 2021-03-10

## 2021-03-10 NOTE — PROGRESS NOTES
Assessment & Plan     Flushing reaction  Uncertain the cause of this. After asking more about medications mom does note she has been reducing her Gabapentin slowly more recently and that is why they are increasing her sertraline. Question if it is just a reaction from reduction in medication or normal physiologic response to needing to have a BM, did discuss symptomatic treatment for constipation, did not obtain labs or imaging at this time as she is not having any pain. Recommended monitoring closely and possibly track symptoms.     Lightheadedness  This seems to have resolved since she has been in clinic, upon arriving was holding onto mom but was able to ambulate to bathroom and back unassisted and appearing stable.  May be related to Sertraline increase going from 75 up to 100 mg even though this dose adjustment started on Saturday.  Encouraged to stay hydrated in the meantime.  Vitals are stable, exam benign, again no labs ordered at this time but could consider CBC, Comp, TSH, etc.  Likely anxiety/depression worsened her sensation of this today.  She was very tearful initially but resolved by end of the visit after realizing she was feeling better.     Severe episode of recurrent major depressive disorder, without psychotic features (H)  Adjusted Med list to reflect the med changes per Psychiatry.   - hydrOXYzine (ATARAX) 25 MG tablet; Take 1 tablet at bedtime and another 1 tablet daily as needed      Return in about 2 weeks (around 3/24/2021) for If not improving, sooner if worse or new concerns.    Options for treatment and follow-up care were reviewed with the patient and/or guardian. Patient and/or guardian engaged in the decision making process and verbalized understanding of the options discussed and agreed with the final plan.    Erasmo Ricketts PA-C  [facility]    Vanita Fam is a 20 year old who presents for the following health issues  accompanied by her  mother:    HPI       Concern - faint  Onset: few days  Description: feeling dizzy, faint, headache  Intensity: moderate  Progression of Symptoms:  same  Accompanying Signs & Symptoms: had sertraline was increased from 75 mg to 100 mg and is wondering if that has anything to do with it  Previous history of similar problem: no  Precipitating factors:        Worsened by: movement  Alleviating factors:        Improved by: sitting in a fixed position  Therapies tried and outcome:  none     - Started today while at school was walking down the [place] and started to feel like she could pass out, she didn't.   - She has felt off balance.  Needed mom to help her get here today.   - She has a slight headache.  Feels like her thinking is foggy.  Maybe some nausea but no vomiting.  No vision changes.  No chest palpitations, chest pain or significant shortness of breath.   - Feels clammy.     Concern - BM concerns  Onset: 1 week maybe longer  Description: gets warm and sweaty before a BM  Intensity: mild  Progression of Symptoms:  worsening  Accompanying Signs & Symptoms: she gets nauseous and hot and clammy   Previous history of similar problem: no  Precipitating factors:        Worsened by: nothing  Alleviating factors:        Improved by: nothing  Therapies tried and outcome:  none     - Has had more issues with constipation.   - The last 2 weeks she has sensation of her entire body being hot, she started to monitor and realized after a BM she felt cooler.  She thinks the hot feeling might happen at other times as well.  It feels like she's physically not, it doesn't feel like a burning sensation.   - She has no rashes or skin changes.  Does feel clammy today.   - She has had a BM daily, has been more difficult to pass recently.  NO vomiting, diarrhea, blood in stool, pain with urination, blood in urine, fevers, chills.       Review of Systems   Constitutional, HEENT, cardiovascular, pulmonary, gi and gu, psych systems are  negative, except as otherwise noted.      Objective    /82   Pulse 77   Temp 98.1  F (36.7  C) (Temporal)   Resp 14   Wt 66.2 kg (146 lb)   LMP 03/10/2021 (Exact Date)   SpO2 99%   BMI 26.33 kg/m    Body mass index is 26.33 kg/m .  Physical Exam   GENERAL: healthy, alert and no distress  EYES: Eyes grossly normal to inspection, PERRL and conjunctivae and sclerae normal  HENT: ear canals and TM's normal, nose and mouth without ulcers or lesions  NECK: no adenopathy, no asymmetry, masses, or scars and thyroid normal to palpation  RESP: lungs clear to auscultation - no rales, rhonchi or wheezes  CV: regular rate and rhythm, normal S1 S2, no S3 or S4, no murmur, click or rub, no peripheral edema and peripheral pulses strong  ABDOMEN: soft, nontender, no hepatosplenomegaly, no masses and bowel sounds normal  MS: no gross musculoskeletal defects noted, no edema  SKIN: no suspicious lesions or rashes  NEURO: Normal strength and tone, sensory exam grossly normal, mentation intact, speech normal, cranial nerves 2-12 intact and gait normal  PSYCH: mentation appears normal, anxious and appearance disheveled

## 2021-03-15 ENCOUNTER — OFFICE VISIT (OUTPATIENT)
Dept: FAMILY MEDICINE | Facility: OTHER | Age: 21
End: 2021-03-15
Payer: COMMERCIAL

## 2021-03-15 VITALS
TEMPERATURE: 97.2 F | SYSTOLIC BLOOD PRESSURE: 102 MMHG | DIASTOLIC BLOOD PRESSURE: 60 MMHG | HEIGHT: 63 IN | HEART RATE: 93 BPM | WEIGHT: 142 LBS | BODY MASS INDEX: 25.16 KG/M2 | OXYGEN SATURATION: 98 %

## 2021-03-15 DIAGNOSIS — F41.9 ANXIETY: ICD-10-CM

## 2021-03-15 DIAGNOSIS — R52 GENERALIZED BODY ACHES: ICD-10-CM

## 2021-03-15 DIAGNOSIS — R42 LIGHTHEADEDNESS: ICD-10-CM

## 2021-03-15 DIAGNOSIS — R68.83 CHILLS: ICD-10-CM

## 2021-03-15 DIAGNOSIS — R53.83 FATIGUE, UNSPECIFIED TYPE: ICD-10-CM

## 2021-03-15 DIAGNOSIS — R07.89 CHEST PRESSURE: Primary | ICD-10-CM

## 2021-03-15 DIAGNOSIS — F33.2 SEVERE EPISODE OF RECURRENT MAJOR DEPRESSIVE DISORDER, WITHOUT PSYCHOTIC FEATURES (H): ICD-10-CM

## 2021-03-15 LAB
ALBUMIN SERPL-MCNC: 4 G/DL (ref 3.4–5)
ALP SERPL-CCNC: 77 U/L (ref 40–150)
ALT SERPL W P-5'-P-CCNC: 18 U/L (ref 0–50)
ANION GAP SERPL CALCULATED.3IONS-SCNC: 2 MMOL/L (ref 3–14)
AST SERPL W P-5'-P-CCNC: 6 U/L (ref 0–45)
BILIRUB SERPL-MCNC: 0.3 MG/DL (ref 0.2–1.3)
BUN SERPL-MCNC: 14 MG/DL (ref 7–30)
CALCIUM SERPL-MCNC: 9.3 MG/DL (ref 8.5–10.1)
CHLORIDE SERPL-SCNC: 105 MMOL/L (ref 94–109)
CO2 SERPL-SCNC: 31 MMOL/L (ref 20–32)
CREAT SERPL-MCNC: 0.67 MG/DL (ref 0.52–1.04)
DEPRECATED CALCIDIOL+CALCIFEROL SERPL-MC: 35 UG/L (ref 20–75)
ERYTHROCYTE [DISTWIDTH] IN BLOOD BY AUTOMATED COUNT: 11.7 % (ref 10–15)
GFR SERPL CREATININE-BSD FRML MDRD: >90 ML/MIN/{1.73_M2}
GLUCOSE SERPL-MCNC: 78 MG/DL (ref 70–99)
HCT VFR BLD AUTO: 44 % (ref 35–47)
HGB BLD-MCNC: 15.1 G/DL (ref 11.7–15.7)
IRON SERPL-MCNC: 97 UG/DL (ref 35–180)
MCH RBC QN AUTO: 30.8 PG (ref 26.5–33)
MCHC RBC AUTO-ENTMCNC: 34.3 G/DL (ref 31.5–36.5)
MCV RBC AUTO: 90 FL (ref 78–100)
PLATELET # BLD AUTO: 265 10E9/L (ref 150–450)
POTASSIUM SERPL-SCNC: 4.3 MMOL/L (ref 3.4–5.3)
PROT SERPL-MCNC: 7.7 G/DL (ref 6.8–8.8)
RBC # BLD AUTO: 4.91 10E12/L (ref 3.8–5.2)
SODIUM SERPL-SCNC: 138 MMOL/L (ref 133–144)
TSH SERPL DL<=0.005 MIU/L-ACNC: 2.93 MU/L (ref 0.4–4)
WBC # BLD AUTO: 6.1 10E9/L (ref 4–11)

## 2021-03-15 PROCEDURE — 99214 OFFICE O/P EST MOD 30 MIN: CPT | Performed by: PHYSICIAN ASSISTANT

## 2021-03-15 PROCEDURE — 83540 ASSAY OF IRON: CPT | Performed by: PHYSICIAN ASSISTANT

## 2021-03-15 PROCEDURE — 85027 COMPLETE CBC AUTOMATED: CPT | Performed by: PHYSICIAN ASSISTANT

## 2021-03-15 PROCEDURE — 82306 VITAMIN D 25 HYDROXY: CPT | Performed by: PHYSICIAN ASSISTANT

## 2021-03-15 PROCEDURE — 80053 COMPREHEN METABOLIC PANEL: CPT | Performed by: PHYSICIAN ASSISTANT

## 2021-03-15 PROCEDURE — 36415 COLL VENOUS BLD VENIPUNCTURE: CPT | Performed by: PHYSICIAN ASSISTANT

## 2021-03-15 PROCEDURE — 93000 ELECTROCARDIOGRAM COMPLETE: CPT | Performed by: PHYSICIAN ASSISTANT

## 2021-03-15 PROCEDURE — 84443 ASSAY THYROID STIM HORMONE: CPT | Performed by: PHYSICIAN ASSISTANT

## 2021-03-15 ASSESSMENT — ANXIETY QUESTIONNAIRES
3. WORRYING TOO MUCH ABOUT DIFFERENT THINGS: NEARLY EVERY DAY
GAD7 TOTAL SCORE: 19
6. BECOMING EASILY ANNOYED OR IRRITABLE: MORE THAN HALF THE DAYS
2. NOT BEING ABLE TO STOP OR CONTROL WORRYING: NEARLY EVERY DAY
1. FEELING NERVOUS, ANXIOUS, OR ON EDGE: NEARLY EVERY DAY
7. FEELING AFRAID AS IF SOMETHING AWFUL MIGHT HAPPEN: NEARLY EVERY DAY
IF YOU CHECKED OFF ANY PROBLEMS ON THIS QUESTIONNAIRE, HOW DIFFICULT HAVE THESE PROBLEMS MADE IT FOR YOU TO DO YOUR WORK, TAKE CARE OF THINGS AT HOME, OR GET ALONG WITH OTHER PEOPLE: EXTREMELY DIFFICULT
5. BEING SO RESTLESS THAT IT IS HARD TO SIT STILL: MORE THAN HALF THE DAYS

## 2021-03-15 ASSESSMENT — MIFFLIN-ST. JEOR: SCORE: 1384.36

## 2021-03-15 ASSESSMENT — PATIENT HEALTH QUESTIONNAIRE - PHQ9
SUM OF ALL RESPONSES TO PHQ QUESTIONS 1-9: 12
5. POOR APPETITE OR OVEREATING: NEARLY EVERY DAY

## 2021-03-15 NOTE — PROGRESS NOTES
Assessment & Plan   She has a number of vague complaints today, we will rule out some medical etiologies and have her follow-up with psychiatry and mental health as planned later today  Chest pressure  Headache, intermittent complaint EKG was normal she will continue to monitor  - Comprehensive metabolic panel (BMP + Alb, Alk Phos, ALT, AST, Total. Bili, TP)  - EKG 12-lead complete w/read - Clinics    Anxiety  Follows with psychiatry, has an appointment later today to discuss her mental health and physical symptoms with psychiatry so they can adjust her meds  - Comprehensive metabolic panel (BMP + Alb, Alk Phos, ALT, AST, Total. Bili, TP)  - TSH with free T4 reflex  - Vitamin D Deficiency    Generalized body aches  Labs pending  - Comprehensive metabolic panel (BMP + Alb, Alk Phos, ALT, AST, Total. Bili, TP)  - TSH with free T4 reflex  - Vitamin D Deficiency    Fatigue, unspecified type  Labs pending  - CBC with platelets  - Comprehensive metabolic panel (BMP + Alb, Alk Phos, ALT, AST, Total. Bili, TP)  - Vitamin D Deficiency  - Iron    Lightheadedness  Improved from last week, labs are pending  - CBC with platelets  - Comprehensive metabolic panel (BMP + Alb, Alk Phos, ALT, AST, Total. Bili, TP)  - Iron    Chills    - TSH with free T4 reflex  - Iron    Severe episode of recurrent major depressive disorder, without psychotic features (H)  Follows with psychiatry  - Vitamin D Deficiency             Depression Screening Follow Up    PHQ 3/15/2021   PHQ-9 Total Score 12   Q9: Thoughts of better off dead/self-harm past 2 weeks Several days   F/U: Thoughts of suicide or self-harm -   F/U: Self harm-plan -   F/U: Self-harm action -   F/U: Safety concerns -   Some encounter information is confidential and restricted. Go to Review Flowsheets activity to see all data.     Last PHQ-9 3/15/2021   1.  Little interest or pleasure in doing things 1   2.  Feeling down, depressed, or hopeless 2   3.  Trouble falling or staying  asleep, or sleeping too much 2   4.  Feeling tired or having little energy 2   5.  Poor appetite or overeating 1   6.  Feeling bad about yourself 1   7.  Trouble concentrating 1   8.  Moving slowly or restless 1   Q9: Thoughts of better off dead/self-harm past 2 weeks 1   PHQ-9 Total Score 12   Difficulty at work, home, or with people Very difficult   In the past two weeks have you had thoughts of suicide or self harm? -   Do you have concerns about your personal safety or the safety of others? -   In the past 2 weeks have you thought about a plan or had intention to harm yourself? -   In the past 2 weeks have you acted on these thoughts in any way? -   Some encounter information is confidential and restricted. Go to Review Flowsheets activity to see all data.         She denies any plans or intention, only random intermittent suicidal thoughts      Follow Up      Follow Up Actions Taken  Referred patient back to mental health provider  Has appt today    Discussed the following ways the patient can remain in a safe environment:        Return in about 2 weeks (around 3/29/2021), or if symptoms worsen or fail to improve.    Leti Mathews PA-C  [facility]   Cristel is a 20 year old who presents for the following health issues  accompanied by her mother:    HPI       Pt presents for a f/u from last week. Continues to be lightheaded, have body aches, chest pressure, feeling hot and cold, headaches and feeling itchy. Pt states very emotional and  Anxiety.    Patient is here with her mother, they are interested in doing some laboratory work today.  She has an appointment later today with psychiatry at 1030 and counselor at 430.  Her symptoms have been ongoing for about a week.  Patient states she has been careful to eat and drink normally after her visit last week with [facility].  She has been sleeping fairly normally per patient.  She does not feel as dizzy as she did before though she  "continues to feel lightheaded, by this she means \"light on her feet\" like she has an imbalance or generally weak.  She is achy all over.  She does have some chills, has not had fevers her sides hurt, has pain inside her ribs bilaterally.  She is not sure what makes it worse or better.  She has intermittent chest pressure it occurs at rest, she is not sure if she is anxious and this leads to chest pressure or if the chest pressure leads to anxiety.  She is not sure what seems to cause her relief her chest pressure either.  She has pressure behind her eyes.  She has no nasal symptoms.  She does have a vague itchiness that she feels everywhere intermittently.  She has no corresponding rash.  She has intermittent headaches, etc. band around the top of her head she has no corresponding neck pain.  She is very fatigued.  Her emotions are up and down.  She was doing better before, she is frustrated by this.  She does have some suicidal thoughts but no plans or intention.  She will be seeing psychiatry later this morning and her counselor later today.    Review of Systems   Constitutional, HEENT, cardiovascular, pulmonary, gi and gu systems are negative, except as otherwise noted.      Objective    /60   Pulse 93   Temp 97.2  F (36.2  C) (Temporal)   Ht 1.61 m (5' 3.39\")   Wt 64.4 kg (142 lb)   LMP 03/10/2021 (Exact Date)   SpO2 98%   BMI 24.85 kg/m    Body mass index is 24.85 kg/m .  Physical Exam   GENERAL: healthy, alert and no distress  HENT: ear canals and TM's normal, nose and mouth without ulcers or lesions  NECK: no adenopathy, no asymmetry, masses, or scars and thyroid normal to palpation  RESP: lungs clear to auscultation - no rales, rhonchi or wheezes  RESP: No chest wall tenderness  CV: regular rate and rhythm, normal S1 S2, no S3 or S4, no murmur, click or rub, no peripheral edema and peripheral pulses strong  ABDOMEN: soft, nontender, no hepatosplenomegaly, no masses and bowel sounds normal  MS: " no gross musculoskeletal defects noted, no edema  SKIN: no suspicious lesions or rashes  NEURO: Normal strength and tone, mentation intact and speech normal  NEURO: cranial nerves 2-12 intact and Romberg normal  PSYCH: mentation appears normal, affect normal, anxious at times, she carefully thinks about all answers to her the questions I asked sometimes pausing for long periods of time and thought    Results for orders placed or performed in visit on 03/15/21   CBC with platelets     Status: None   Result Value Ref Range    WBC 6.1 4.0 - 11.0 10e9/L    RBC Count 4.91 3.8 - 5.2 10e12/L    Hemoglobin 15.1 11.7 - 15.7 g/dL    Hematocrit 44.0 35.0 - 47.0 %    MCV 90 78 - 100 fl    MCH 30.8 26.5 - 33.0 pg    MCHC 34.3 31.5 - 36.5 g/dL    RDW 11.7 10.0 - 15.0 %    Platelet Count 265 150 - 450 10e9/L     EKG no signs of acute ST seg changes, has NSR,

## 2021-03-16 ASSESSMENT — ANXIETY QUESTIONNAIRES: GAD7 TOTAL SCORE: 19

## 2021-03-29 NOTE — PROGRESS NOTES
Assessment & Plan     Metrorrhagia  We discussed her medical history and menstrual cycles and recommendations for options to manage. Given some of her mood symptoms, would recommend trial of combined regimen. She would like pills over transdermal patches and vaginal ring. Will try Kariva as it has less hormone free days, which may help some of her pre menstrual changes. Discussed it will take a few cycles to adjust to the medication. Discussed when to start the pill, taking it at the same time every day, possible side effects she may experience. If she is doing well, follow up via telephone visit in 6 months, sooner as needed. Patient and her mother agree to this plan. Patient is given an opportunity to ask questions and have them answered.  - desogestrel-ethinyl estradiol (KARIVA) 0.15-0.02/0.01 MG (21/5) tablet; Take 1 tablet by mouth daily  {  [facility] [place]   Cristel is a 20 year old who presents for the following health issues  accompanied by her mother:    HPI     Menstrual cycle concerns    Patient presents with her mother to discuss menstrual cycle concerns. Used to be regular monthly, last 6-7 days. Cramping for a few days starting before onset of bleeding. No intermenstrual bleeding, heavy flow. Since last summer, cycles have changed. Has been working with a behavioral health specialist and they are still working on finding a right combination of medication, so they are unsure of some of the stressors and med changes may be contributing to her menstrual changes.   Has missed cycles, gone over 60 days with no bleeding. Flow has become lighter but cramping may be worsening. Has symptoms prior to onset of menses that impact her moods.   Had tried oral contraceptive pill years ago when she was younger for cramping as well as contraception. No contraindications to use of hormonal medication.  Patient would prefer low dose of anything as she feels  "she is overly sensitive to medications.    Review of Systems   Constitutional, HEENT, cardiovascular, pulmonary, gi and gu systems are negative, except as otherwise noted.      Objective    /72 (BP Location: Right arm, Patient Position: Sitting, Cuff Size: Adult Regular)   Pulse 74   Temp 97.9  F (36.6  C) (Tympanic)   Ht 1.607 m (5' 3.25\")   Wt 64.8 kg (142 lb 12.8 oz)   LMP 03/10/2021 (Exact Date)   SpO2 98%   BMI 25.10 kg/m    Body mass index is 25.1 kg/m .  Physical Exam   GENERAL: healthy, alert and no distress  MS: no gross musculoskeletal defects noted, no edema  SKIN: no suspicious lesions or rashes  PSYCH: mentation appears normal, affect normal/bright    "

## 2021-03-30 ENCOUNTER — PRENATAL OFFICE VISIT (OUTPATIENT)
Dept: OBGYN | Facility: CLINIC | Age: 21
End: 2021-03-30
Payer: COMMERCIAL

## 2021-03-30 VITALS
HEART RATE: 74 BPM | DIASTOLIC BLOOD PRESSURE: 72 MMHG | BODY MASS INDEX: 25.3 KG/M2 | TEMPERATURE: 97.9 F | WEIGHT: 142.8 LBS | SYSTOLIC BLOOD PRESSURE: 106 MMHG | HEIGHT: 63 IN | OXYGEN SATURATION: 98 %

## 2021-03-30 DIAGNOSIS — N92.1 METRORRHAGIA: Primary | ICD-10-CM

## 2021-03-30 PROCEDURE — 99203 OFFICE O/P NEW LOW 30 MIN: CPT | Performed by: NURSE PRACTITIONER

## 2021-03-30 RX ORDER — SERTRALINE HYDROCHLORIDE 25 MG/1
25 TABLET, FILM COATED ORAL DAILY
COMMUNITY
Start: 2021-03-24 | End: 2021-05-20

## 2021-03-30 RX ORDER — DESOGESTREL AND ETHINYL ESTRADIOL 21-5 (28)
1 KIT ORAL DAILY
Qty: 84 TABLET | Refills: 1 | Status: SHIPPED | OUTPATIENT
Start: 2021-03-30 | End: 2021-05-20

## 2021-03-30 ASSESSMENT — PAIN SCALES - GENERAL: PAINLEVEL: NO PAIN (0)

## 2021-03-30 ASSESSMENT — MIFFLIN-ST. JEOR: SCORE: 1385.83

## 2021-04-24 ENCOUNTER — HEALTH MAINTENANCE LETTER (OUTPATIENT)
Age: 21
End: 2021-04-24

## 2021-04-26 ENCOUNTER — OFFICE VISIT (OUTPATIENT)
Dept: DERMATOLOGY | Facility: CLINIC | Age: 21
End: 2021-04-26
Payer: COMMERCIAL

## 2021-04-26 VITALS — DIASTOLIC BLOOD PRESSURE: 68 MMHG | HEART RATE: 62 BPM | OXYGEN SATURATION: 100 % | SYSTOLIC BLOOD PRESSURE: 118 MMHG

## 2021-04-26 DIAGNOSIS — L70.0 ACNE VULGARIS: Primary | ICD-10-CM

## 2021-04-26 PROCEDURE — 99214 OFFICE O/P EST MOD 30 MIN: CPT | Performed by: PHYSICIAN ASSISTANT

## 2021-04-26 RX ORDER — CLINDAMYCIN PHOSPHATE 10 MG/G
GEL TOPICAL 2 TIMES DAILY
Qty: 60 G | Refills: 11 | Status: SHIPPED | OUTPATIENT
Start: 2021-04-26 | End: 2022-05-05

## 2021-04-26 RX ORDER — TRETINOIN 0.5 MG/G
CREAM TOPICAL
Qty: 45 G | Refills: 11 | Status: SHIPPED | OUTPATIENT
Start: 2021-04-26 | End: 2021-10-08

## 2021-04-26 NOTE — PROGRESS NOTES
HPI:   CC: Cristel Grissom is a pleasant 20 year old female who presents for evaluation and treatment of acne  Condition has been present for: many years.   Pt complains of pain: Yes     Previous treatments include: numerous OTC and PDT laser which helped.   Areas Involved: face, chest and back  Current Outpatient Medications   Medication Sig Dispense Refill     desogestrel-ethinyl estradiol (KARIVA) 0.15-0.02/0.01 MG (21/5) tablet Take 1 tablet by mouth daily 84 tablet 1     gabapentin (NEURONTIN) 100 MG capsule 100 mg twice per day       hydrOXYzine (ATARAX) 25 MG tablet Take 1 tablet at bedtime and another 1 tablet daily as needed 240 tablet 0     propranolol (INDERAL) 10 MG tablet Take 10 mg three times per day       sertraline (ZOLOFT) 25 MG tablet Take 25 mg by mouth daily        sertraline (ZOLOFT) 50 MG tablet Take 50 mg by mouth daily Unsure of dose. Prescribed by psychiatry.       Allergies   Allergen Reactions     Bactrim [Sulfamethoxazole W/Trimethoprim]      When very young, vomiting likely per mom     Cefzil [Cefprozil]      When very young, vomiting likely per mom     Penicillins      When very young, vomiting likely per mom     Sulfa Drugs      When very young, vomiting likely per mom     Denies any other skin complaints, in general feels well: Yes  Review of symptoms otherwise negative:Yes    PHYSICAL EXAM:   A&Ox3: Yes   Well developed/well nourished female Yes   Mood appropriate Yes      /68 (BP Location: Right arm, Cuff Size: Adult Large)   Pulse 62   SpO2 100%   Type 2 skin. Mood appropriate  Alert and Oriented X 3. Well developed, well nourished in no distress.  General appearance: Normal  Head including face: Normal  Eyes: conjunctiva and lids: Normal  Mouth: Lips, teeth, gums: Normal  Neck: Normal  Back:   Cardiovascular: Exam of peripheral vascular system by observation for swelling, varicosities, edema: Normal  Extremities: digits/nails (clubbing): Normal  Right upper extremity:  Normal  Left upper extremity: Normal  Right lower extremity: Normal  Left lower extremity: Normal  Skin: Scalp and body hair: See below     Comedones Papules/Pustules Cysts Staining Scarring   Face/Neck 2+ 2+ 0 1+ 0   Chest 1-2+ 0 0 0 0   Back 1-2+ 1+ 0 0 0     Telangiectasias: No Fixed Erythema: No Exoriations: No   Other Physical Exam Findings:    ASSESSMENT & PLAN:     1. Acne Vulgaris - advised on diagnosis and treatment options. Discussed use of topical medications and antibiotics. She is not good at taking pills and would like to stay off of any oral medications at this point. She tends to be oily and not sensitive.   --Start OTC BPO wash every day in the shower  --Start clindamycin gel QAM  --Start tretinoin 0.05% cream QHS          Pt advised on use and risks including photosensitivity, allergic reactions, GI upset, headaches, nausea, erythema, scaling, vertigo, asthralgias, blood clots:Yes    Follow-up: 3-4 months  CC:   Scribed By: Kadi Smith MS, PA-C

## 2021-04-26 NOTE — LETTER
4/26/2021         RE: Cristel Grissom  [address] [facility] [address]        Dear Colleague,    Thank you for referring your patient, Cristel Grissom, to the [facility]. Please see a copy of my visit note below.    HPI:   CC: Cristel Grissom is a pleasant 20 year old female who presents for evaluation and treatment of acne  Condition has been present for: many years.   Pt complains of pain: Yes     Previous treatments include: numerous OTC and PDT laser which helped.   Areas Involved: face, chest and back  Current Outpatient Medications   Medication Sig Dispense Refill     desogestrel-ethinyl estradiol (KARIVA) 0.15-0.02/0.01 MG (21/5) tablet Take 1 tablet by mouth daily 84 tablet 1     gabapentin (NEURONTIN) 100 MG capsule 100 mg twice per day       hydrOXYzine (ATARAX) 25 MG tablet Take 1 tablet at bedtime and another 1 tablet daily as needed 240 tablet 0     propranolol (INDERAL) 10 MG tablet Take 10 mg three times per day       sertraline (ZOLOFT) 25 MG tablet Take 25 mg by mouth daily        sertraline (ZOLOFT) 50 MG tablet Take 50 mg by mouth daily Unsure of dose. Prescribed by psychiatry.       Allergies   Allergen Reactions     Bactrim [Sulfamethoxazole W/Trimethoprim]      When very young, vomiting likely per mom     Cefzil [Cefprozil]      When very young, vomiting likely per mom     Penicillins      When very young, vomiting likely per mom     Sulfa Drugs      When very young, vomiting likely per mom     Denies any other skin complaints, in general feels well: Yes  Review of symptoms otherwise negative:Yes    PHYSICAL EXAM:   A&Ox3: Yes   Well developed/well nourished female Yes   Mood appropriate Yes      /68 (BP Location: Right arm, Cuff Size: Adult Large)   Pulse 62   SpO2 100%   Type 2 skin. Mood appropriate  Alert and Oriented X 3. Well developed, well nourished in no distress.  General appearance: Normal  Head including face: Normal  Eyes: conjunctiva and  lids: Normal  Mouth: Lips, teeth, gums: Normal  Neck: Normal  Back:   Cardiovascular: Exam of peripheral vascular system by observation for swelling, varicosities, edema: Normal  Extremities: digits/nails (clubbing): Normal  Right upper extremity: Normal  Left upper extremity: Normal  Right lower extremity: Normal  Left lower extremity: Normal  Skin: Scalp and body hair: See below     Comedones Papules/Pustules Cysts Staining Scarring   Face/Neck 2+ 2+ 0 1+ 0   Chest 1-2+ 0 0 0 0   Back 1-2+ 1+ 0 0 0     Telangiectasias: No Fixed Erythema: No Exoriations: No   Other Physical Exam Findings:    ASSESSMENT & PLAN:     1. Acne Vulgaris - advised on diagnosis and treatment options. Discussed use of topical medications and antibiotics. She is not good at taking pills and would like to stay off of any oral medications at this point. She tends to be oily and not sensitive.   --Start OTC BPO wash every day in the shower  --Start clindamycin gel QAM  --Start tretinoin 0.05% cream QHS          Pt advised on use and risks including photosensitivity, allergic reactions, GI upset, headaches, nausea, erythema, scaling, vertigo, asthralgias, blood clots:Yes    Follow-up: 3-4 months  CC:   Scribed By: Kadi Smith, MS, PATODD          Again, thank you for allowing me to participate in the care of your patient.        Sincerely,        Kadi Smith PA-C

## 2021-05-17 ENCOUNTER — MYC MEDICAL ADVICE (OUTPATIENT)
Dept: FAMILY MEDICINE | Facility: OTHER | Age: 21
End: 2021-05-17

## 2021-05-18 ENCOUNTER — MYC MEDICAL ADVICE (OUTPATIENT)
Dept: FAMILY MEDICINE | Facility: OTHER | Age: 21
End: 2021-05-18

## 2021-05-19 NOTE — TELEPHONE ENCOUNTER
RN Triage    Patient Contact    Attempt # 1    Was call answered?  No.  Left message on voicemail with information to call me back.    There is a video visit set up for tomorrow 5/20/21. Will send [employer] message and close encounter.     ESTEBAN Armstrong/[facility]

## 2021-05-19 NOTE — PROGRESS NOTES
"Cristel is a 21 year old who is being evaluated via a billable video visit.      How would you like to obtain your AVS? [employer]  If the video visit is dropped, the invitation should be resent by: Text to cell phone: [phone]- will do through [email or web address]  Will anyone else be joining your video visit? No      Video Start Time: 11:14 AM    Assessment & Plan     Viral upper respiratory tract infection  Has had 2 negative Covid tests, we will rule her out for strep throat otherwise she will treat her symptoms over-the-counter staying home until symptoms have improved and she is fever free off of fever reducing meds, she will also contact her employer for their policies    Sore throat  Pending  - Streptococcus A Rapid Scr w Reflx to PCR; Future             BMI:   Estimated body mass index is 25.1 kg/m  as calculated from the following:    Height as of 3/30/21: 1.607 m (5' 3.25\").    Weight as of 3/30/21: 64.8 kg (142 lb 12.8 oz).   Not addressed at virtual visit        No follow-ups on file.    OMAR [facility]   Cristel is a 21 year old who presents for the following health issues     HPI     Acute Illness  Acute illness concerns:   Onset/Duration: 1 week  Symptoms:  Fever: YES, as high as 101 initially, around 99 ever since  Chills/Sweats: YES  Headache (location?): YES, frontal  Sinus Pressure: no  Conjunctivitis:  YES  Ear Pain: YES: bilateral  Rhinorrhea: YES, not a lot, intermittent has been greenish per pt, yesterday   Congestion: YES  Sore Throat: YES  Cough: YES, rare cough unsure if it is productive  Wheeze: no, noSOB  Decreased Appetite: YES  Nausea: no  Vomiting: no  Diarrhea: no  Dysuria/Freq.: no  Dysuria or Hematuria: no  Fatigue/Achiness: YES, both though not always at the same time  Sick/Strep Exposure: no  Therapies tried and outcome: nothing      Review of Systems   As documented above       Objective           Vitals:  No vitals were obtained today due " to virtual visit.    Physical Exam   GENERAL: Healthy, alert and no distress  EYES: Eyes grossly normal to inspection.  No discharge or erythema, or obvious scleral/conjunctival abnormalities.  RESP: No audible wheeze, cough, or visible cyanosis.  No visible retractions or increased work of breathing.    RESP: Voice sounded congested  SKIN: Visible skin clear. No significant rash, abnormal pigmentation or lesions.  NEURO: Cranial nerves grossly intact.  Mentation and speech appropriate for age.  PSYCH: Mentation appears normal, affect normal/bright, judgement and insight intact, normal speech and appearance well-groomed.    No results found for any visits on 05/20/21.            Video-Visit Details    Type of service:  Video Visit    Video End Time:11:28 AM    Originating Location (pt. Location): Home    Distant Location (provider location):  [facility]     Platform used for Video Visit: [employer]

## 2021-05-20 ENCOUNTER — VIRTUAL VISIT (OUTPATIENT)
Dept: FAMILY MEDICINE | Facility: OTHER | Age: 21
End: 2021-05-20
Payer: COMMERCIAL

## 2021-05-20 ENCOUNTER — ALLIED HEALTH/NURSE VISIT (OUTPATIENT)
Dept: FAMILY MEDICINE | Facility: OTHER | Age: 21
End: 2021-05-20
Payer: COMMERCIAL

## 2021-05-20 DIAGNOSIS — J02.9 SORE THROAT: Primary | ICD-10-CM

## 2021-05-20 DIAGNOSIS — J06.9 VIRAL UPPER RESPIRATORY TRACT INFECTION: ICD-10-CM

## 2021-05-20 LAB
DEPRECATED S PYO AG THROAT QL EIA: NEGATIVE
SPECIMEN SOURCE: NORMAL
SPECIMEN SOURCE: NORMAL
STREP GROUP A PCR: NOT DETECTED

## 2021-05-20 PROCEDURE — 87651 STREP A DNA AMP PROBE: CPT | Performed by: PHYSICIAN ASSISTANT

## 2021-05-20 PROCEDURE — 99213 OFFICE O/P EST LOW 20 MIN: CPT | Mod: GT | Performed by: PHYSICIAN ASSISTANT

## 2021-05-20 PROCEDURE — 99207 PR NO CHARGE NURSE ONLY: CPT

## 2021-05-20 PROCEDURE — 99N1174 PR STATISTIC STREP A RAPID: Performed by: PHYSICIAN ASSISTANT

## 2021-05-21 ENCOUNTER — MYC MEDICAL ADVICE (OUTPATIENT)
Dept: FAMILY MEDICINE | Facility: OTHER | Age: 21
End: 2021-05-21

## 2021-05-22 NOTE — PROGRESS NOTES
Assessment & Plan     Acute non-recurrent maxillary sinusitis  At this point some of her symptoms have resolved as you would expect with acute viral illness but some have persisted including nasal congestion, drainage, and sinus pressure.  She did note a slight cough but thinks it is more drainage related.  She has already had mono in the past.  Elected to start her on Doxycycline for coverage for sinusitis and recommended she start nasal steroid, nasal saline rinses and decongestants to help her symptoms. Given information on proper use and potential side effects. Consider COVID antibody testing in future as this may have still been COVID related but has had two negative COVID tests since her symptoms started and do not feel she needs repeat testing at this time.  But some of her prolonged symptoms such as achiness which has improved may be due to this.  Consider additional work-up if symptoms persist.   - doxycycline monohydrate (ADOXA) 100 MG tablet; Take 1 tablet (100 mg) by mouth 2 times daily for 7 days    Return in about 1 week (around 5/31/2021) for If not improving, sooner if worse or new concerns.    Options for treatment and follow-up care were reviewed with the patient and/or guardian. Patient and/or guardian engaged in the decision making process and verbalized understanding of the options discussed and agreed with the final plan.    Erasmo Ricketts PA-C  [employer] PAULETTE Fam is a 21 year old who presents for the following health issues  accompanied by her mother:    History of Present Illness       She eats 2-3 servings of fruits and vegetables daily.She consumes 2 sweetened beverage(s) daily.She exercises with enough effort to increase her heart rate 9 or less minutes per day.  She exercises with enough effort to increase her heart rate 3 or less days per week.   She is taking medications regularly.       Acute Illness  Acute illness concerns:    Onset/Duration:10 days  Symptoms:  Fever: low grade   Chills/Sweats: YES  Headache (location?): YES  Sinus Pressure: YES  Conjunctivitis:  no  Ear Pain: YES: both  Rhinorrhea: YES- nostril pain  Congestion: YES  Sore Throat: YES  Cough: no  Wheeze: no  Decreased Appetite: YES  Nausea: no  Vomiting: no  Diarrhea: no  Dysuria/Freq.: no  Dysuria or Hematuria: no  Rashes: no  Fatigue/Achiness: YES - improving   Sick/Strep Exposure: no  Therapies tried and outcome: None    Rib pressure  No travel outside [facility]   No known bug bites.     Review of Systems   Constitutional, HEENT, cardiovascular, pulmonary, gi and gu systems are negative, except as otherwise noted.      Objective    /82   Pulse 84   Temp 97.2  F (36.2  C) (Temporal)   Resp 18   Wt 64.9 kg (143 lb)   SpO2 98%   BMI 25.13 kg/m    Body mass index is 25.13 kg/m .  Physical Exam   GENERAL: healthy, alert and no distress  EYES: Eyes grossly normal to inspection, PERRL and conjunctivae and sclerae normal  HENT: normal cephalic/atraumatic, ear canals and TM's normal, nasal mucosa edematous , rhinorrhea white, oropharynx clear, oral mucous membranes moist and sinuses: maxillary tenderness on bilateral very mildly.   NECK: no adenopathy, no asymmetry, masses, or scars and thyroid normal to palpation  RESP: lungs clear to auscultation - no rales, rhonchi or wheezes  CV: regular rate and rhythm, normal S1 S2, no S3 or S4, no murmur, click or rub, no peripheral edema and peripheral pulses strong  MS: no gross musculoskeletal defects noted, no edema  SKIN: no suspicious lesions or rashes      Answers for HPI/ROS submitted by the patient on 5/24/2021   Chronic problems general questions HPI Form  If you checked off any problems, how difficult have these problems made it for you to do your work, take care of things at home, or get along with other people?: Not difficult at all  PHQ9 TOTAL SCORE: 0  SUSU 7 TOTAL SCORE: 8

## 2021-05-24 ENCOUNTER — OFFICE VISIT (OUTPATIENT)
Dept: FAMILY MEDICINE | Facility: OTHER | Age: 21
End: 2021-05-24
Payer: COMMERCIAL

## 2021-05-24 VITALS
WEIGHT: 143 LBS | TEMPERATURE: 97.2 F | RESPIRATION RATE: 18 BRPM | OXYGEN SATURATION: 98 % | DIASTOLIC BLOOD PRESSURE: 82 MMHG | HEART RATE: 84 BPM | SYSTOLIC BLOOD PRESSURE: 124 MMHG | BODY MASS INDEX: 25.13 KG/M2

## 2021-05-24 DIAGNOSIS — J01.00 ACUTE NON-RECURRENT MAXILLARY SINUSITIS: Primary | ICD-10-CM

## 2021-05-24 PROCEDURE — 99213 OFFICE O/P EST LOW 20 MIN: CPT | Performed by: PHYSICIAN ASSISTANT

## 2021-05-24 RX ORDER — DOXYCYCLINE 100 MG/1
100 TABLET ORAL 2 TIMES DAILY
Qty: 14 TABLET | Refills: 0 | Status: SHIPPED | OUTPATIENT
Start: 2021-05-24 | End: 2021-05-31

## 2021-05-24 ASSESSMENT — ANXIETY QUESTIONNAIRES
GAD7 TOTAL SCORE: 8
7. FEELING AFRAID AS IF SOMETHING AWFUL MIGHT HAPPEN: MORE THAN HALF THE DAYS
2. NOT BEING ABLE TO STOP OR CONTROL WORRYING: SEVERAL DAYS
4. TROUBLE RELAXING: SEVERAL DAYS
3. WORRYING TOO MUCH ABOUT DIFFERENT THINGS: SEVERAL DAYS
GAD7 TOTAL SCORE: 8
7. FEELING AFRAID AS IF SOMETHING AWFUL MIGHT HAPPEN: MORE THAN HALF THE DAYS
6. BECOMING EASILY ANNOYED OR IRRITABLE: SEVERAL DAYS
5. BEING SO RESTLESS THAT IT IS HARD TO SIT STILL: NOT AT ALL
GAD7 TOTAL SCORE: 8
1. FEELING NERVOUS, ANXIOUS, OR ON EDGE: MORE THAN HALF THE DAYS

## 2021-05-24 ASSESSMENT — PATIENT HEALTH QUESTIONNAIRE - PHQ9
10. IF YOU CHECKED OFF ANY PROBLEMS, HOW DIFFICULT HAVE THESE PROBLEMS MADE IT FOR YOU TO DO YOUR WORK, TAKE CARE OF THINGS AT HOME, OR GET ALONG WITH OTHER PEOPLE: NOT DIFFICULT AT ALL
SUM OF ALL RESPONSES TO PHQ QUESTIONS 1-9: 0
SUM OF ALL RESPONSES TO PHQ QUESTIONS 1-9: 0

## 2021-05-24 ASSESSMENT — PAIN SCALES - GENERAL: PAINLEVEL: MILD PAIN (3)

## 2021-05-25 ASSESSMENT — ANXIETY QUESTIONNAIRES: GAD7 TOTAL SCORE: 8

## 2021-05-25 ASSESSMENT — PATIENT HEALTH QUESTIONNAIRE - PHQ9: SUM OF ALL RESPONSES TO PHQ QUESTIONS 1-9: 0

## 2021-05-26 ENCOUNTER — MYC MEDICAL ADVICE (OUTPATIENT)
Dept: FAMILY MEDICINE | Facility: OTHER | Age: 21
End: 2021-05-26

## 2021-05-26 DIAGNOSIS — B37.9 ANTIBIOTIC-INDUCED YEAST INFECTION: Primary | ICD-10-CM

## 2021-05-26 DIAGNOSIS — T36.95XA ANTIBIOTIC-INDUCED YEAST INFECTION: Primary | ICD-10-CM

## 2021-05-26 RX ORDER — FLUCONAZOLE 150 MG/1
150 TABLET ORAL ONCE
Qty: 1 TABLET | Refills: 0 | Status: SHIPPED | OUTPATIENT
Start: 2021-05-26 | End: 2021-05-26

## 2021-06-08 ENCOUNTER — APPOINTMENT (OUTPATIENT)
Dept: CT IMAGING | Facility: CLINIC | Age: 21
End: 2021-06-08
Attending: NURSE PRACTITIONER
Payer: COMMERCIAL

## 2021-06-08 ENCOUNTER — HOSPITAL ENCOUNTER (EMERGENCY)
Facility: CLINIC | Age: 21
Discharge: HOME | End: 2021-06-08
Attending: NURSE PRACTITIONER | Admitting: NURSE PRACTITIONER
Payer: COMMERCIAL

## 2021-06-08 VITALS
BODY MASS INDEX: 24.92 KG/M2 | TEMPERATURE: 97.6 F | WEIGHT: 141.8 LBS | OXYGEN SATURATION: 99 % | HEART RATE: 71 BPM | DIASTOLIC BLOOD PRESSURE: 70 MMHG | SYSTOLIC BLOOD PRESSURE: 110 MMHG | RESPIRATION RATE: 16 BRPM

## 2021-06-08 DIAGNOSIS — R29.898 HEAVY SENSATION OF LOWER EXTREMITY: ICD-10-CM

## 2021-06-08 PROCEDURE — 99284 EMERGENCY DEPT VISIT MOD MDM: CPT | Mod: 25 | Performed by: NURSE PRACTITIONER

## 2021-06-08 PROCEDURE — 70450 CT HEAD/BRAIN W/O DYE: CPT

## 2021-06-08 PROCEDURE — 250N000013 HC RX MED GY IP 250 OP 250 PS 637: Performed by: NURSE PRACTITIONER

## 2021-06-08 PROCEDURE — 99284 EMERGENCY DEPT VISIT MOD MDM: CPT | Performed by: NURSE PRACTITIONER

## 2021-06-08 RX ORDER — HYDROXYZINE HYDROCHLORIDE 25 MG/1
25 TABLET, FILM COATED ORAL ONCE
Status: COMPLETED | OUTPATIENT
Start: 2021-06-08 | End: 2021-06-08

## 2021-06-08 RX ADMIN — HYDROXYZINE HYDROCHLORIDE 25 MG: 25 TABLET, FILM COATED ORAL at 18:58

## 2021-06-08 ASSESSMENT — ENCOUNTER SYMPTOMS
TROUBLE SWALLOWING: 0
EYES NEGATIVE: 1
FACIAL ASYMMETRY: 0
HEMATOLOGIC/LYMPHATIC NEGATIVE: 1
CHILLS: 0
FEVER: 0
DIZZINESS: 0
PALPITATIONS: 0
COUGH: 0
VOMITING: 0
NERVOUS/ANXIOUS: 1
SPEECH DIFFICULTY: 0
BACK PAIN: 0

## 2021-06-08 NOTE — ED NOTES
Further conversation reveals underlying anxiety and depression issues.  She is stressed over finding a summer job.  Pt was hospitalized last August for 3 weeks and it was not a good experience.  Pt and mother also admit that her anxieties manifest physically.  Pt and mother are very open in discussion and have a good plan in place to manage pt's anxiety.

## 2021-06-08 NOTE — ED PROVIDER NOTES
Triage Note  1654 Right leg numbness started 2-3 days ago.  Now having right arm numbness and a HA.        History     Chief Complaint   Patient presents with     Numbness     HPI  Cristel Grissom is a 21 year old female who with anxiety, major depressive disorder presenting to the emergency department with feeling like her right leg is heavy.  It occurred this morning.  She went and took a nap and upon awaking this afternoon she felt like her right arm was heavier than normal as well.  She has not had any recent injuries, no fever or chills, she denies any weakness to her extremities, no facial droop, no slurred speech or expressive aphasia and, no change in hearing or vision, no family history of early TIAs nor strokes.  She has not had any recent immobility or concerns for DVTs.    She is mildly tearful on exam.  Patient reports she does take 25 mg of Atarax for anxiety which she significantly has at this time.  She reports she does have a mild frontal headache.  Reports it is 4 out of 10 throbbing ache.  She has not had any nuchal rigidity.    PCP: Erasmo Ricketts     Allergies:  Allergies   Allergen Reactions     Bactrim [Sulfamethoxazole W/Trimethoprim]      When very young, vomiting likely per mom     Cefzil [Cefprozil]      When very young, vomiting likely per mom     Penicillins      When very young, vomiting likely per mom     Sulfa Drugs      When very young, vomiting likely per mom       Problem List:    Patient Active Problem List    Diagnosis Date Noted     MDD (major depressive disorder), recurrent episode (H) 08/26/2020     Priority: Medium     Consumes a vegan diet - beans as a source of protein, multivit with iron 07/15/2020     Priority: Medium     Dizziness 07/15/2020     Priority: Medium     Malaise and fatigue 07/15/2020     Priority: Medium     Stomach upset 07/15/2020     Priority: Medium     Chest discomfort - central pressure 07/15/2020     Priority: Medium     Right eye injury, initial  encounter 08/09/2019     Priority: Medium     Acne vulgaris 01/25/2017     Priority: Medium     SUSU (generalized anxiety disorder) 01/25/2017     Priority: Medium        Past Medical History:    Past Medical History:   Diagnosis Date     Adjustment disorder      Anxiety      Depression      PTSD (post-traumatic stress disorder)        Past Surgical History:    No past surgical history on file.    Family History:    Family History   Problem Relation Age of Onset     Substance Abuse Maternal Uncle        Social History:  Marital Status:  Single [1]  Social History     Tobacco Use     Smoking status: Never Smoker     Smokeless tobacco: Never Used   Substance Use Topics     Alcohol use: No     Drug use: Not Currently     Types: Marijuana        Medications:    gabapentin (NEURONTIN) 100 MG capsule  hydrOXYzine (ATARAX) 25 MG tablet  propranolol (INDERAL) 10 MG tablet  sertraline (ZOLOFT) 50 MG tablet  clindamycin (CLINDAMAX) 1 % external gel  tretinoin (RETIN-A) 0.05 % external cream          Review of Systems   Constitutional: Negative for chills and fever.   HENT: Positive for tinnitus. Negative for congestion and trouble swallowing.         Reports occasional tinnitus none currently   Eyes: Negative.    Respiratory: Negative for cough.    Cardiovascular: Negative for palpitations.   Gastrointestinal: Negative for vomiting.   Genitourinary: Negative.    Musculoskeletal: Negative for back pain and gait problem.   Skin: Negative.    Allergic/Immunologic: Negative for immunocompromised state.   Neurological: Negative for dizziness, syncope, facial asymmetry and speech difficulty.   Hematological: Negative.    Psychiatric/Behavioral: The patient is nervous/anxious.        Physical Exam   BP: (!) 134/92  Pulse: 103  Temp: 97.6  F (36.4  C)  Resp: 18  Weight: 64.3 kg (141 lb 12.8 oz)  SpO2: 99 %      Physical Exam  Vitals signs and nursing note reviewed.   Constitutional:       Appearance: Normal appearance.      Comments:  Tearful on exam   HENT:      Head: Normocephalic and atraumatic.      Jaw: There is normal jaw occlusion.      Mouth/Throat:      Lips: Pink.      Mouth: Mucous membranes are moist.      Tongue: No lesions. Tongue does not deviate from midline.      Palate: No lesions.      Pharynx: Oropharynx is clear.   Eyes:      Extraocular Movements: Extraocular movements intact.      Conjunctiva/sclera: Conjunctivae normal.      Pupils: Pupils are equal, round, and reactive to light.   Neck:      Musculoskeletal: Neck supple. No neck rigidity.   Cardiovascular:      Rate and Rhythm: Normal rate and regular rhythm.      Pulses: Normal pulses.      Heart sounds: Normal heart sounds.   Pulmonary:      Effort: Pulmonary effort is normal.      Breath sounds: Normal breath sounds.   Neurological:      General: No focal deficit present.      Mental Status: She is alert.      GCS: GCS eye subscore is 4. GCS verbal subscore is 5. GCS motor subscore is 6.      Cranial Nerves: Cranial nerves are intact.      Sensory: Sensation is intact.      Motor: Motor function is intact.      Coordination: Coordination is intact.      Gait: Gait is intact.   Psychiatric:         Attention and Perception: Attention normal.         Mood and Affect: Mood is anxious. Affect is tearful.         Speech: Speech normal.         Behavior: Behavior is cooperative.         ED Course  I discussed with the patient and her mother in regards to CT scan if it would help with her anxiety regarding her feeling like her right side is heavy.  She does not have any significant weakness sensations intact she is not mimicking a stroke and she is at low risk for 1.  I discussed the risks regarding CT exposure due to the amount of radiation.  Patient verbalized understanding and she would like Atarax for anxiety and undergo a CT scan.  Discussed with the patient in regards to potential multiple sclerosis if symptoms persist or she has any other neurologic change such as  blurred vision or incontinence of urine and if this occurs she would need to be evaluated for multiple sclerosis.  Patient and her mother verbalized understanding.    1942 reviewed with the patient and her mother in regards to a negative head CT.  She is remained neuro logically intact here in the ED.  She is to follow-up with her PCP in the next week.  I discussed red flag symptoms concerning multiple sclerosis and strokes and if noting return back to the emergency department for evaluation.        Procedures      Results for orders placed or performed during the hospital encounter of 06/08/21 (from the past 24 hour(s))   CT Head w/o Contrast    Narrative    CT SCAN OF THE HEAD WITHOUT CONTRAST   6/8/2021 7:00 PM     HISTORY: Transient ischemic attack (TIA); right arm and leg feeling  heavy; neurologically intact.    TECHNIQUE:  Axial images of the head and coronal reformations without  IV contrast material. Radiation dose for this scan was reduced using  automated exposure control, adjustment of the mA and/or kV according  to patient size, or iterative reconstruction technique.    COMPARISON: None.    FINDINGS: There is no evidence of intracranial hemorrhage, mass, acute  infarct or anomaly. The ventricles are normal in size, shape and  configuration. The brain parenchyma and subarachnoid spaces are  normal.     Mucosal thickening in the right frontal sinus. The bony calvarium and  bones of the skull base appear intact.       Impression    IMPRESSION: No evidence of acute intracranial hemorrhage, mass, or  herniation.      EDGAR RON MD       Medications   hydrOXYzine (ATARAX) tablet 25 mg (25 mg Oral Given 6/8/21 1858)       Assessments & Plan (with Medical Decision Making)     I have reviewed the nursing notes.    I have reviewed the findings, diagnosis, plan and need for follow up with the patient.    New Prescriptions    No medications on file       Final diagnoses:   Heavy sensation of lower extremity        6/8/2021   [facility] EMERGENCY DEPT     Xiomara Partida, ASHELY CNP  06/08/21 1943

## 2021-06-08 NOTE — ED NOTES
Numbness in right leg that started as a leg and then it became heavy feeling and gets tingly.  Tingling is intermittent and lasts minutes.  Pt states she also had tingling in her right arm.  Mother states that pt woke up from a nap and the right arm felt numb.  Pt also reports pain in left side of neck, started today.  Pt admits to being anxious and scared.  She is tearful.

## 2021-06-09 ENCOUNTER — OFFICE VISIT (OUTPATIENT)
Dept: FAMILY MEDICINE | Facility: OTHER | Age: 21
End: 2021-06-09
Payer: COMMERCIAL

## 2021-06-09 VITALS
HEIGHT: 63 IN | OXYGEN SATURATION: 97 % | SYSTOLIC BLOOD PRESSURE: 100 MMHG | RESPIRATION RATE: 14 BRPM | HEART RATE: 88 BPM | TEMPERATURE: 97.8 F | DIASTOLIC BLOOD PRESSURE: 60 MMHG | BODY MASS INDEX: 24.63 KG/M2 | WEIGHT: 139 LBS

## 2021-06-09 DIAGNOSIS — J06.9 RECENT URI: ICD-10-CM

## 2021-06-09 DIAGNOSIS — R29.898 HEAVY SENSATION OF LOWER EXTREMITY: Primary | ICD-10-CM

## 2021-06-09 LAB
ALBUMIN SERPL-MCNC: 4.2 G/DL (ref 3.4–5)
ALP SERPL-CCNC: 70 U/L (ref 40–150)
ALT SERPL W P-5'-P-CCNC: 19 U/L (ref 0–50)
ANION GAP SERPL CALCULATED.3IONS-SCNC: 4 MMOL/L (ref 3–14)
AST SERPL W P-5'-P-CCNC: 11 U/L (ref 0–45)
BASOPHILS # BLD AUTO: 0 10E9/L (ref 0–0.2)
BASOPHILS NFR BLD AUTO: 0.3 %
BILIRUB SERPL-MCNC: 0.5 MG/DL (ref 0.2–1.3)
BUN SERPL-MCNC: 13 MG/DL (ref 7–30)
CALCIUM SERPL-MCNC: 9.3 MG/DL (ref 8.5–10.1)
CHLORIDE SERPL-SCNC: 108 MMOL/L (ref 94–109)
CO2 SERPL-SCNC: 29 MMOL/L (ref 20–32)
CREAT SERPL-MCNC: 0.67 MG/DL (ref 0.52–1.04)
CRP SERPL-MCNC: <2.9 MG/L (ref 0–8)
DIFFERENTIAL METHOD BLD: NORMAL
EOSINOPHIL # BLD AUTO: 0.2 10E9/L (ref 0–0.7)
EOSINOPHIL NFR BLD AUTO: 2.8 %
ERYTHROCYTE [DISTWIDTH] IN BLOOD BY AUTOMATED COUNT: 12.3 % (ref 10–15)
ERYTHROCYTE [SEDIMENTATION RATE] IN BLOOD BY WESTERGREN METHOD: 9 MM/H (ref 0–20)
GFR SERPL CREATININE-BSD FRML MDRD: >90 ML/MIN/{1.73_M2}
GLUCOSE SERPL-MCNC: 113 MG/DL (ref 70–99)
HCT VFR BLD AUTO: 42.2 % (ref 35–47)
HGB BLD-MCNC: 14.6 G/DL (ref 11.7–15.7)
LYMPHOCYTES # BLD AUTO: 1.7 10E9/L (ref 0.8–5.3)
LYMPHOCYTES NFR BLD AUTO: 27.3 %
MAGNESIUM SERPL-MCNC: 2.3 MG/DL (ref 1.6–2.3)
MCH RBC QN AUTO: 30.5 PG (ref 26.5–33)
MCHC RBC AUTO-ENTMCNC: 34.6 G/DL (ref 31.5–36.5)
MCV RBC AUTO: 88 FL (ref 78–100)
MONOCYTES # BLD AUTO: 0.7 10E9/L (ref 0–1.3)
MONOCYTES NFR BLD AUTO: 11.1 %
NEUTROPHILS # BLD AUTO: 3.5 10E9/L (ref 1.6–8.3)
NEUTROPHILS NFR BLD AUTO: 58.5 %
PLATELET # BLD AUTO: 314 10E9/L (ref 150–450)
POTASSIUM SERPL-SCNC: 3.9 MMOL/L (ref 3.4–5.3)
PROT SERPL-MCNC: 7.9 G/DL (ref 6.8–8.8)
RBC # BLD AUTO: 4.79 10E12/L (ref 3.8–5.2)
SODIUM SERPL-SCNC: 141 MMOL/L (ref 133–144)
TSH SERPL DL<=0.005 MIU/L-ACNC: 1.12 MU/L (ref 0.4–4)
VIT B12 SERPL-MCNC: 696 PG/ML (ref 193–986)
WBC # BLD AUTO: 6.1 10E9/L (ref 4–11)

## 2021-06-09 PROCEDURE — 36415 COLL VENOUS BLD VENIPUNCTURE: CPT | Performed by: PHYSICIAN ASSISTANT

## 2021-06-09 PROCEDURE — 99214 OFFICE O/P EST MOD 30 MIN: CPT | Performed by: PHYSICIAN ASSISTANT

## 2021-06-09 PROCEDURE — 80050 GENERAL HEALTH PANEL: CPT | Performed by: PHYSICIAN ASSISTANT

## 2021-06-09 PROCEDURE — 86140 C-REACTIVE PROTEIN: CPT | Performed by: PHYSICIAN ASSISTANT

## 2021-06-09 PROCEDURE — 82607 VITAMIN B-12: CPT | Performed by: PHYSICIAN ASSISTANT

## 2021-06-09 PROCEDURE — 85652 RBC SED RATE AUTOMATED: CPT | Performed by: PHYSICIAN ASSISTANT

## 2021-06-09 PROCEDURE — 86769 SARS-COV-2 COVID-19 ANTIBODY: CPT | Performed by: PHYSICIAN ASSISTANT

## 2021-06-09 PROCEDURE — 83735 ASSAY OF MAGNESIUM: CPT | Performed by: PHYSICIAN ASSISTANT

## 2021-06-09 ASSESSMENT — MIFFLIN-ST. JEOR: SCORE: 1367.01

## 2021-06-09 NOTE — PROGRESS NOTES
Assessment & Plan     Heavy sensation of lower extremity  No significant concerns on the examination today, she has no focal neurologic deficits which is reassurring and she doesn't have a lot of additional symptoms to coincide with her current symptoms.  Do question if some of her mood symptoms are making this worse but not likely the root cause of her issue.  Did obtain labs which show no concerns.  The ER did mention concerns with MS and to follow-up if symptoms persist.  She is a young female and it is something to consider.  Recommended seeing if symptoms evolve or change over the next week and if not improving consider MRI to evaluate further.  Would target the spine to start since her symptoms are in the extremities.  We did discuss CT is not the ideal modality to identify MS in the brain. She will notify us sooner if she has new symptoms or concerns.   - Comprehensive metabolic panel  - CBC with platelets differential  - TSH with free T4 reflex  - ESR: Erythrocyte sedimentation rate  - CRP, inflammation  - COVID-19 Alfred RBD Joy & Titer Reflex  - Vitamin B12  - Magnesium    Recent URI  Did have illnesses in the last month and was negative for COVID on PCR, did check antibodies which were negative.   - COVID-19 Alfred RBD Joy & Titer Reflex    Return in about 2 weeks (around 6/23/2021) for If not improving, sooner if worse or new concerns.    Options for treatment and follow-up care were reviewed with the patient and/or guardian. Patient and/or guardian engaged in the decision making process and verbalized understanding of the options discussed and agreed with the final plan.    Erasmo Ricketts PA-C  [facility] LYNNE Fam is a 21 year old who presents for the following health issues  accompanied by her mother: Fabienne    JOEL     ED/ Followup:    Facility:  [facility]  Date of visit: 6/8/21  Reason for visit: numbness in right leg and arm  Current Status: ED ruled out  "stroke and pt was told to follow up with PCP, still experiencing symptoms     - The symptoms first started around 6/5/2021. She first noticed it on the right thigh. It was a small area and then it has since then started to spread.  She thought maybe it was actually a little uncomfortable.  It started to spread further throughout the lower leg and then started in the right arm.  She says it just feels heavy. She can still move it.  It isn't necessarily significantly painful.  She initially declines low back pain but she did MyChart after the visit she has had some discomfort in the right buttock region.    - She did go to the ER and did have a CT of her head done which showed no acute concerns.   - She has not had any significant headaches, she did have a headache when she went to the ED that day but she thinks it might have been her anxiety causing her symptoms.   - Hasn't had any loss of vision, blurry vision, double vision.   - No fevers, chills, weakness, difficulty moving, skin changes, rashes.   - No medication changes recently.  No new supplements.   - No known history of neurologic disorders.   - no injuries or new trauma to account for symptoms.     Review of Systems   Constitutional, HEENT, cardiovascular, pulmonary, GI, , musculoskeletal, neuro, skin, endocrine and psych systems are negative, except as otherwise noted.      Objective    /60   Pulse 88   Temp 97.8  F (36.6  C) (Temporal)   Resp 14   Ht 1.612 m (5' 3.47\")   Wt 63 kg (139 lb)   LMP 05/19/2021 (Exact Date)   SpO2 97%   BMI 24.26 kg/m    Body mass index is 24.26 kg/m .  Physical Exam   GENERAL: healthy, alert and no distress  EYES: Eyes grossly normal to inspection, PERRL and conjunctivae and sclerae normal  HENT: ear canals and TM's normal, nose and mouth without ulcers or lesions  NECK: no adenopathy, no asymmetry, masses, or scars and thyroid normal to palpation  RESP: lungs clear to auscultation - no rales, rhonchi or " wheezes  CV: regular rate and rhythm, normal S1 S2, no S3 or S4, no murmur, click or rub, no peripheral edema and peripheral pulses strong  MS: no gross musculoskeletal defects noted, no edema  SKIN: no suspicious lesions or rashes  NEURO: Normal strength and tone, sensory exam grossly normal, mentation intact, speech normal, cranial nerves 2-12 intact, DTR's normal and symmetric 2+ biceps and Patellar reflexes bilaterally, gait normal including heel/toe/tandem walking, Romberg normal and rapid alternating movements normal  PSYCH: mentation appears normal, judgement and insight intact and appearance well groomed    Results for orders placed or performed in visit on 06/09/21   Comprehensive metabolic panel     Status: Abnormal   Result Value Ref Range    Sodium 141 133 - 144 mmol/L    Potassium 3.9 3.4 - 5.3 mmol/L    Chloride 108 94 - 109 mmol/L    Carbon Dioxide 29 20 - 32 mmol/L    Anion Gap 4 3 - 14 mmol/L    Glucose 113 (H) 70 - 99 mg/dL    Urea Nitrogen 13 7 - 30 mg/dL    Creatinine 0.67 0.52 - 1.04 mg/dL    GFR Estimate >90 >60 mL/min/[1.73_m2]    GFR Estimate If Black >90 >60 mL/min/[1.73_m2]    Calcium 9.3 8.5 - 10.1 mg/dL    Bilirubin Total 0.5 0.2 - 1.3 mg/dL    Albumin 4.2 3.4 - 5.0 g/dL    Protein Total 7.9 6.8 - 8.8 g/dL    Alkaline Phosphatase 70 40 - 150 U/L    ALT 19 0 - 50 U/L    AST 11 0 - 45 U/L   CBC with platelets differential     Status: None   Result Value Ref Range    WBC 6.1 4.0 - 11.0 10e9/L    RBC Count 4.79 3.8 - 5.2 10e12/L    Hemoglobin 14.6 11.7 - 15.7 g/dL    Hematocrit 42.2 35.0 - 47.0 %    MCV 88 78 - 100 fl    MCH 30.5 26.5 - 33.0 pg    MCHC 34.6 31.5 - 36.5 g/dL    RDW 12.3 10.0 - 15.0 %    Platelet Count 314 150 - 450 10e9/L    % Neutrophils 58.5 %    % Lymphocytes 27.3 %    % Monocytes 11.1 %    % Eosinophils 2.8 %    % Basophils 0.3 %    Absolute Neutrophil 3.5 1.6 - 8.3 10e9/L    Absolute Lymphocytes 1.7 0.8 - 5.3 10e9/L    Absolute Monocytes 0.7 0.0 - 1.3 10e9/L    Absolute  Eosinophils 0.2 0.0 - 0.7 10e9/L    Absolute Basophils 0.0 0.0 - 0.2 10e9/L    Diff Method Automated Method    TSH with free T4 reflex     Status: None   Result Value Ref Range    TSH 1.12 0.40 - 4.00 mU/L   ESR: Erythrocyte sedimentation rate     Status: None   Result Value Ref Range    Sed Rate 9 0 - 20 mm/h   CRP, inflammation     Status: None   Result Value Ref Range    CRP Inflammation <2.9 0.0 - 8.0 mg/L   COVID-19 Alfred RBD Joy & Titer Reflex     Status: None   Result Value Ref Range    COVID-19 Alfred RBD Joy Negative     COVID-19 Alfred RBD Joy Titer Not Applicable    Vitamin B12     Status: None   Result Value Ref Range    Vitamin B12 696 193 - 986 pg/mL   Magnesium     Status: None   Result Value Ref Range    Magnesium 2.3 1.6 - 2.3 mg/dL

## 2021-06-09 NOTE — DISCHARGE INSTRUCTIONS
CT scan is negative for any acute abnormality of the brain.  I would like you to follow-up with your primary care provider within the week.  Certainly return back to the emergency department if you are having any strokelike symptoms such as extremity weakness, slurred speech, change in vision, significant dizziness.

## 2021-06-10 LAB
SARS-COV-2 AB PNL SERPL IA: NEGATIVE
SARS-COV-2 IGG SERPL IA-ACNC: NORMAL

## 2021-06-10 NOTE — TELEPHONE ENCOUNTER
S: [facility] ED seeking IP MH placement for a 21yo female presenting with SI, anxiety, HI towards family.     B: Pt hx of anxiety since age 6. Pt recently stopped her medication one year ago. Pt endorses recent worsening anxiety and ruminating thoughts, went to the doctor and started celexa on Sunday, has not slept since. Pt presents anxious, trembling, and crying. Pt began to have thoughts of self harm with intent to hurt herself and her family, asked her mom to hide all the knives in the home. The HI is isolated to her family only. Pt denies hx of IP MH. Pt denies acute medical or CD concerns.     A:  Medically cleared, utox and COVID pending, Pt is voluntary.     R: Patient cleared and ready for behavioral bed placement: Yes    Pt accepted for admission to [facility]. Unit and ED informed of disposition.

## 2021-06-12 DIAGNOSIS — N89.8 VAGINAL DISCHARGE: ICD-10-CM

## 2021-06-12 LAB
SPECIMEN SOURCE: NORMAL
WET PREP SPEC: NORMAL

## 2021-06-12 PROCEDURE — 87210 SMEAR WET MOUNT SALINE/INK: CPT | Performed by: PHYSICIAN ASSISTANT

## 2021-06-15 NOTE — PROGRESS NOTES
{PROVIDER CHARTING PREFERENCE:580416}    Subjective   Cristel is a 21 year old who presents for the following health issues {ACCOMPANIED BY STATEMENT (Optional):936990}    HPI     {SUPERLIST (Optional):196779}  {additonal problems for provider to add (Optional):892967}    Review of Systems   {ROS COMP (Optional):897132}      Objective    LMP 05/19/2021 (Exact Date)   There is no height or weight on file to calculate BMI.  Physical Exam   {Exam List (Optional):304102}    {Diagnostic Test Results (Optional):239162}    {AMBULATORY ATTESTATION (Optional):523440}

## 2021-06-16 ENCOUNTER — ANCILLARY PROCEDURE (OUTPATIENT)
Dept: GENERAL RADIOLOGY | Facility: OTHER | Age: 21
End: 2021-06-16
Attending: PHYSICIAN ASSISTANT
Payer: COMMERCIAL

## 2021-06-16 ENCOUNTER — OFFICE VISIT (OUTPATIENT)
Dept: FAMILY MEDICINE | Facility: OTHER | Age: 21
End: 2021-06-16
Payer: COMMERCIAL

## 2021-06-16 VITALS
WEIGHT: 142.5 LBS | RESPIRATION RATE: 16 BRPM | DIASTOLIC BLOOD PRESSURE: 62 MMHG | HEART RATE: 104 BPM | TEMPERATURE: 97.8 F | HEIGHT: 63 IN | OXYGEN SATURATION: 97 % | SYSTOLIC BLOOD PRESSURE: 110 MMHG | BODY MASS INDEX: 25.25 KG/M2

## 2021-06-16 DIAGNOSIS — N76.0 VAGINITIS AND VULVOVAGINITIS: ICD-10-CM

## 2021-06-16 DIAGNOSIS — R10.31 RLQ ABDOMINAL PAIN: ICD-10-CM

## 2021-06-16 DIAGNOSIS — R10.31 RLQ ABDOMINAL PAIN: Primary | ICD-10-CM

## 2021-06-16 DIAGNOSIS — Z12.4 SCREENING FOR MALIGNANT NEOPLASM OF CERVIX: ICD-10-CM

## 2021-06-16 LAB
ALBUMIN UR-MCNC: NEGATIVE MG/DL
APPEARANCE UR: CLEAR
BILIRUB UR QL STRIP: NEGATIVE
COLOR UR AUTO: YELLOW
GLUCOSE UR STRIP-MCNC: NEGATIVE MG/DL
HGB UR QL STRIP: NEGATIVE
KETONES UR STRIP-MCNC: NEGATIVE MG/DL
LEUKOCYTE ESTERASE UR QL STRIP: NEGATIVE
NITRATE UR QL: NEGATIVE
PH UR STRIP: 5.5 PH (ref 5–7)
SOURCE: NORMAL
SP GR UR STRIP: >1.03 (ref 1–1.03)
UROBILINOGEN UR STRIP-ACNC: 0.2 EU/DL (ref 0.2–1)

## 2021-06-16 PROCEDURE — 99214 OFFICE O/P EST MOD 30 MIN: CPT | Performed by: PHYSICIAN ASSISTANT

## 2021-06-16 PROCEDURE — G0145 SCR C/V CYTO,THINLAYER,RESCR: HCPCS | Performed by: PHYSICIAN ASSISTANT

## 2021-06-16 PROCEDURE — 74019 RADEX ABDOMEN 2 VIEWS: CPT | Performed by: RADIOLOGY

## 2021-06-16 PROCEDURE — 81003 URINALYSIS AUTO W/O SCOPE: CPT | Performed by: PHYSICIAN ASSISTANT

## 2021-06-16 RX ORDER — FLUCONAZOLE 150 MG/1
150 TABLET ORAL DAILY
Qty: 3 TABLET | Refills: 0 | Status: SHIPPED | OUTPATIENT
Start: 2021-06-16 | End: 2021-06-19

## 2021-06-16 RX ORDER — SERTRALINE HYDROCHLORIDE 25 MG/1
25 TABLET, FILM COATED ORAL DAILY
COMMUNITY
Start: 2021-06-12

## 2021-06-16 ASSESSMENT — ENCOUNTER SYMPTOMS
CONSTIPATION: 1
NAUSEA: 0
HEARTBURN: 0
WEAKNESS: 0
EYE PAIN: 0
BREAST MASS: 0
CHILLS: 0
NERVOUS/ANXIOUS: 1
JOINT SWELLING: 0
PALPITATIONS: 0
SHORTNESS OF BREATH: 0
SORE THROAT: 0
PARESTHESIAS: 1
FREQUENCY: 1
DYSURIA: 0
HEMATURIA: 0
COUGH: 0
ARTHRALGIAS: 0
ABDOMINAL PAIN: 0
MYALGIAS: 0
DIZZINESS: 0
FEVER: 0
HEMATOCHEZIA: 0
DIARRHEA: 1
HEADACHES: 0

## 2021-06-16 ASSESSMENT — MIFFLIN-ST. JEOR: SCORE: 1375.38

## 2021-06-16 NOTE — PROGRESS NOTES
Assessment & Plan     RLQ abdominal pain  Her x-ray confirmed moderate stool burden and question if this is the cause of her recent back and RLQ abdominal discomfort.  Recommended starting Miralax to help treat her symptoms in the meantime and see if her symptoms resolve.   - XR Abdomen 2 Views; Future  - *UA reflex to Microscopic    Vaginitis and vulvovaginitis  Her wet  Prep was negative again but her discharge and vaginal irritation are concerning for yeast infection, elected to start on Diflucan for 3 day course to see if this resolves her symptoms.  If not resolving then recommend STD screening.    - fluconazole (DIFLUCAN) 150 MG tablet; Take 1 tablet (150 mg) by mouth daily for 3 days    Screening for malignant neoplasm of cervix  Updated Pap smear today on exam.   - Pap imaged thin layer screen only - recommended age 21 - 24 years    Of note her brain MRI is coming up, consider Lumbar MRI based on the left leg now starting to experience symptoms, will continue to closely follow.     Return in about 5 days (around 6/21/2021) for If not improving, sooner if worse or new concerns.     Options for treatment and follow-up care were reviewed with the patient and/or guardian. Patient and/or guardian engaged in the decision making process and verbalized understanding of the options discussed and agreed with the final plan.     OMAR [facility] LYNNE Fam is a 21 year old who presents for the following health issues     Healthy Habits:     Getting at least 3 servings of Calcium per day:  Yes    Bi-annual eye exam:  Yes    Dental care twice a year:  Yes    Sleep apnea or symptoms of sleep apnea:  None    Diet:  Regular (no restrictions)    Frequency of exercise:  None    Taking medications regularly:  Yes    Medication side effects:  None    PHQ-2 Total Score: 2    Additional concerns today:  No     Vaginal Symptoms  Onset/Duration: unsure  Description:  Vaginal  Discharge: clear   Itching (Pruritis): YES  Burning sensation:  no  Odor: no  Accompanying Signs & Symptoms:  Urinary symptoms: YES- pain in left low back before urinating.   Abdominal pain: YES- left lower - constipation. All the time.   Fever: no  History:   Sexually active: yes she was.   New Partner: not applicable  Possibility of Pregnancy:  no  Recent antibiotic use: YES- sinus infection, yeast infection.   Previous vaginitis issues: YES  Precipitating or alleviating factors: None  Therapies tried and outcome: none    - She noticed this week that she was having some left sided back pain and then right sided abdominal pain.  She wasn't sure if it could be constipation or not.   - She says she is supposed to have her period soon so she wasn't sure if that was the cause of the vaginal discharge or not. She notes that it hurts where she urinates.   - She declines concerns about STDs at this time.       Of note her left leg is now starting to experience the same symptoms as her right leg.     Review of Systems   Constitutional: Negative for chills and fever.   HENT: Negative for congestion, ear pain, hearing loss and sore throat.    Eyes: Negative for pain and visual disturbance.   Respiratory: Negative for cough and shortness of breath.    Cardiovascular: Negative for chest pain, palpitations and peripheral edema.   Gastrointestinal: Positive for constipation and diarrhea. Negative for abdominal pain, heartburn, hematochezia and nausea.   Breasts:  Negative for tenderness, breast mass and discharge.   Genitourinary: Positive for frequency, pelvic pain, urgency and vaginal discharge. Negative for dysuria, genital sores, hematuria and vaginal bleeding.   Musculoskeletal: Negative for arthralgias, joint swelling and myalgias.   Skin: Negative for rash.   Neurological: Positive for paresthesias. Negative for dizziness, weakness and headaches.   Psychiatric/Behavioral: Negative for mood changes. The patient is  "nervous/anxious.       Constitutional, GI, , musculoskeletal, neuro, skin, and psych systems are negative, except as otherwise noted.      Objective    /62   Pulse 104   Temp 97.8  F (36.6  C) (Temporal)   Resp 16   Ht 1.6 m (5' 2.99\")   Wt 64.6 kg (142 lb 8 oz)   LMP 05/19/2021 (Exact Date)   SpO2 97%   BMI 25.25 kg/m    Body mass index is 25.25 kg/m .  Physical Exam   GENERAL: healthy, alert and no distress   (female): normal female external genitalia, normal urethral meatus , vaginal mucosa pink, moist, well rugated, vaginal discharge - copious and white and normal cervix, adnexae, and uterus without masses.  MS: no gross musculoskeletal defects noted, no edema  SKIN: no suspicious lesions or rashes  PSYCH: mentation appears normal, affect flat and anxious    Results for orders placed or performed in visit on 06/16/21   XR Abdomen 2 Views     Status: None    Narrative    ABDOMEN TWO VIEWS    6/16/2021 5:38 PM     HISTORY: Right lower quadrant abdominal pain.    COMPARISON: None.      Impression    IMPRESSION: Prominent stool and gas throughout the colon. No small  bowel obstruction or free air.    TIMO CHANDLER MD   Results for orders placed or performed in visit on 06/16/21   *UA reflex to Microscopic     Status: None   Result Value Ref Range    Color Urine Yellow     Appearance Urine Clear     Glucose Urine Negative NEG^Negative mg/dL    Bilirubin Urine Negative NEG^Negative    Ketones Urine Negative NEG^Negative mg/dL    Specific Gravity Urine >1.030 1.003 - 1.035    Blood Urine Negative NEG^Negative    pH Urine 5.5 5.0 - 7.0 pH    Protein Albumin Urine Negative NEG^Negative mg/dL    Urobilinogen Urine 0.2 0.2 - 1.0 EU/dL    Nitrite Urine Negative NEG^Negative    Leukocyte Esterase Urine Negative NEG^Negative    Source Urine                "

## 2021-06-18 LAB
COPATH REPORT: NORMAL
PAP: NORMAL

## 2021-06-24 ENCOUNTER — HOSPITAL ENCOUNTER (OUTPATIENT)
Dept: MRI IMAGING | Facility: CLINIC | Age: 21
Discharge: HOME | End: 2021-06-24
Attending: PHYSICIAN ASSISTANT | Admitting: PHYSICIAN ASSISTANT
Payer: COMMERCIAL

## 2021-06-24 DIAGNOSIS — R29.898 HEAVY SENSATION OF LOWER EXTREMITY: ICD-10-CM

## 2021-06-24 DIAGNOSIS — R20.2 PARESTHESIAS: ICD-10-CM

## 2021-06-24 DIAGNOSIS — R29.898 SENSATION OF HEAVINESS IN EXTREMITIES: ICD-10-CM

## 2021-06-24 PROCEDURE — 70551 MRI BRAIN STEM W/O DYE: CPT

## 2021-06-30 ENCOUNTER — IMMUNIZATION (OUTPATIENT)
Dept: FAMILY MEDICINE | Facility: CLINIC | Age: 21
End: 2021-06-30
Payer: COMMERCIAL

## 2021-06-30 PROCEDURE — 91301 PR COVID VAC MODERNA 100 MCG/0.5 ML IM: CPT

## 2021-06-30 PROCEDURE — 0011A PR COVID VAC MODERNA 100 MCG/0.5 ML IM: CPT

## 2021-07-28 ENCOUNTER — TELEPHONE (OUTPATIENT)
Dept: FAMILY MEDICINE | Facility: OTHER | Age: 21
End: 2021-07-28

## 2021-07-28 ENCOUNTER — IMMUNIZATION (OUTPATIENT)
Dept: FAMILY MEDICINE | Facility: CLINIC | Age: 21
End: 2021-07-28
Attending: FAMILY MEDICINE
Payer: COMMERCIAL

## 2021-07-28 PROCEDURE — 91301 PR COVID VAC MODERNA 100 MCG/0.5 ML IM: CPT

## 2021-07-28 PROCEDURE — 0012A PR COVID VAC MODERNA 100 MCG/0.5 ML IM: CPT

## 2021-07-28 NOTE — TELEPHONE ENCOUNTER
Nurse called to exam room due to patient having a reaction to a vaccine.    Patient was given Moderna Covid 19 vaccine in right arm by MA.    Patient hyperventilating respirations about 36 sitting in chair in exam room. Nurse distracted patient by asking about her pets and their names, asked patient what triggered the anxiety attack - fear of a reaction to the vaccine - patient admits has never had a reaction to any vaccines in the past. Is allergic to numerous antibiotics.   Patient states has been dealing with anxiety since was in 3 rd grade.     After 20 minutes of conversation with patient, a juice cup and tamika crackers.     Patient left clinic calmly breathing evenly.         Natalie Rebolledo RN  [facility]

## 2021-08-04 ENCOUNTER — MYC MEDICAL ADVICE (OUTPATIENT)
Dept: FAMILY MEDICINE | Facility: OTHER | Age: 21
End: 2021-08-04

## 2021-08-05 NOTE — TELEPHONE ENCOUNTER
Responded via [employer].  Miguel Vargas, IVONNEN, RN, PHN  [facility] [place] [facility]  August 5, 2021

## 2021-08-09 ENCOUNTER — OFFICE VISIT (OUTPATIENT)
Dept: DERMATOLOGY | Facility: CLINIC | Age: 21
End: 2021-08-09
Payer: COMMERCIAL

## 2021-08-09 VITALS
BODY MASS INDEX: 25.24 KG/M2 | HEART RATE: 93 BPM | HEIGHT: 63 IN | DIASTOLIC BLOOD PRESSURE: 77 MMHG | SYSTOLIC BLOOD PRESSURE: 120 MMHG

## 2021-08-09 DIAGNOSIS — L70.0 ACNE VULGARIS: Primary | ICD-10-CM

## 2021-08-09 PROCEDURE — 99214 OFFICE O/P EST MOD 30 MIN: CPT | Performed by: PHYSICIAN ASSISTANT

## 2021-08-09 RX ORDER — TRETINOIN 1 MG/G
CREAM TOPICAL
Qty: 45 G | Refills: 11 | Status: SHIPPED | OUTPATIENT
Start: 2021-08-09 | End: 2022-05-05

## 2021-08-09 NOTE — PATIENT INSTRUCTIONS
AM  1. Wash   2. Clindamycin  3. Mineral sunscreen (zinc and/or titanium) - CeraVe tinted is great     PM  1. Wash with the benzoyl peroxide wash  2. Increase to tretinoin 0.1% cream   3. Moisturizer over     Follow up in 4-5 months

## 2021-08-09 NOTE — LETTER
8/9/2021         RE: Cristel Grissom  [address] [facility] [address]        Dear Colleague,    Thank you for referring your patient, Cristel Grissom, to the [facility]. Please see a copy of my visit note below.    HPI:   CC: Cristel Grissom is a pleasant 20 year old female who presents for recheck of acne. She has had some improvement with current regimen but still making new acne.   Condition has been present for: many years.   Pt complains of pain: Yes     Previous treatments include: numerous OTC and PDT laser which helped.   Areas Involved: face, chest and back  Current Outpatient Medications   Medication Sig Dispense Refill     tretinoin (RETIN-A) 0.1 % external cream Apply a pea size to entire face QD 45 g 11     clindamycin (CLINDAMAX) 1 % external gel Apply topically 2 times daily 60 g 11     gabapentin (NEURONTIN) 100 MG capsule daily 100 mg twice per day       hydrOXYzine (ATARAX) 25 MG tablet Take 1 tablet at bedtime and another 1 tablet daily as needed 240 tablet 0     propranolol (INDERAL) 10 MG tablet Take 10 mg three times per day       sertraline (ZOLOFT) 25 MG tablet Take 25 tablets by mouth daily       sertraline (ZOLOFT) 50 MG tablet Take 50 mg by mouth daily Unsure of dose. Prescribed by psychiatry.       tretinoin (RETIN-A) 0.05 % external cream Spread a pea size amount into affected area topically at bedtime.  Use sunscreen SPF>20. 45 g 11     Allergies   Allergen Reactions     Bactrim [Sulfamethoxazole W/Trimethoprim]      When very young, vomiting likely per mom     Cefzil [Cefprozil]      When very young, vomiting likely per mom     Penicillins      When very young, vomiting likely per mom     Sulfa Drugs      When very young, vomiting likely per mom     Denies any other skin complaints, in general feels well: Yes  Review of symptoms otherwise negative:Yes    PHYSICAL EXAM:   A&Ox3: Yes   Well developed/well nourished female Yes   Mood appropriate Yes     "  /77   Pulse 93   Ht 1.6 m (5' 3\")   BMI 25.24 kg/m    Type 2 skin. Mood appropriate  Alert and Oriented X 3. Well developed, well nourished in no distress.  General appearance: Normal  Head including face: Normal  Eyes: conjunctiva and lids: Normal  Mouth: Lips, teeth, gums: Normal  Neck: Normal  Back:   Cardiovascular: Exam of peripheral vascular system by observation for swelling, varicosities, edema: Normal  Extremities: digits/nails (clubbing): Normal  Right upper extremity: Normal  Left upper extremity: Normal  Right lower extremity: Normal  Left lower extremity: Normal  Skin: Scalp and body hair: See below     Comedones Papules/Pustules Cysts Staining Scarring   Face/Neck 1+ 1+ 0 1+ 0   Chest 1-2+ 0 0 0 0   Back 1-2+ 1+ 0 0 0     Telangiectasias: No Fixed Erythema: No Exoriations: No   Other Physical Exam Findings:    ASSESSMENT & PLAN:     1. Acne Vulgaris - improved but still making new acne. No irritation from tretinoin cream.  advised on diagnosis and treatment options. Discussed use of topical medications and antibiotics. She is not good at taking pills and would like to stay off of any oral medications at this point. She tends to be oily and not sensitive.   --Continue OTC BPO wash every day in the shower  --Continue clindamycin gel QAM  --Increase to tretinoin 0.1% cream daily. Discussed potential for dryness/irritation. Advised to use emollients if needed.            Pt advised on use and risks including photosensitivity, allergic reactions, GI upset, headaches, nausea, erythema, scaling, vertigo, asthralgias, blood clots:Yes    Follow-up: 3-4 months  CC:   Scribed By: Kadi Simth, MS, PAJimmyC        Again, thank you for allowing me to participate in the care of your patient.        Sincerely,        Kadi Smith PA-C    "

## 2021-08-09 NOTE — PROGRESS NOTES
"HPI:   CC: Cristel Grissom is a pleasant 20 year old female who presents for recheck of acne. She has had some improvement with current regimen but still making new acne.   Condition has been present for: many years.   Pt complains of pain: Yes     Previous treatments include: numerous OTC and PDT laser which helped.   Areas Involved: face, chest and back  Current Outpatient Medications   Medication Sig Dispense Refill     tretinoin (RETIN-A) 0.1 % external cream Apply a pea size to entire face QD 45 g 11     clindamycin (CLINDAMAX) 1 % external gel Apply topically 2 times daily 60 g 11     gabapentin (NEURONTIN) 100 MG capsule daily 100 mg twice per day       hydrOXYzine (ATARAX) 25 MG tablet Take 1 tablet at bedtime and another 1 tablet daily as needed 240 tablet 0     propranolol (INDERAL) 10 MG tablet Take 10 mg three times per day       sertraline (ZOLOFT) 25 MG tablet Take 25 tablets by mouth daily       sertraline (ZOLOFT) 50 MG tablet Take 50 mg by mouth daily Unsure of dose. Prescribed by psychiatry.       tretinoin (RETIN-A) 0.05 % external cream Spread a pea size amount into affected area topically at bedtime.  Use sunscreen SPF>20. 45 g 11     Allergies   Allergen Reactions     Bactrim [Sulfamethoxazole W/Trimethoprim]      When very young, vomiting likely per mom     Cefzil [Cefprozil]      When very young, vomiting likely per mom     Penicillins      When very young, vomiting likely per mom     Sulfa Drugs      When very young, vomiting likely per mom     Denies any other skin complaints, in general feels well: Yes  Review of symptoms otherwise negative:Yes    PHYSICAL EXAM:   A&Ox3: Yes   Well developed/well nourished female Yes   Mood appropriate Yes      /77   Pulse 93   Ht 1.6 m (5' 3\")   BMI 25.24 kg/m    Type 2 skin. Mood appropriate  Alert and Oriented X 3. Well developed, well nourished in no distress.  General appearance: Normal  Head including face: Normal  Eyes: conjunctiva and " lids: Normal  Mouth: Lips, teeth, gums: Normal  Neck: Normal  Back:   Cardiovascular: Exam of peripheral vascular system by observation for swelling, varicosities, edema: Normal  Extremities: digits/nails (clubbing): Normal  Right upper extremity: Normal  Left upper extremity: Normal  Right lower extremity: Normal  Left lower extremity: Normal  Skin: Scalp and body hair: See below     Comedones Papules/Pustules Cysts Staining Scarring   Face/Neck 1+ 1+ 0 1+ 0   Chest 1-2+ 0 0 0 0   Back 1-2+ 1+ 0 0 0     Telangiectasias: No Fixed Erythema: No Exoriations: No   Other Physical Exam Findings:    ASSESSMENT & PLAN:     1. Acne Vulgaris - improved but still making new acne. No irritation from tretinoin cream.  advised on diagnosis and treatment options. Discussed use of topical medications and antibiotics. She is not good at taking pills and would like to stay off of any oral medications at this point. She tends to be oily and not sensitive.   --Continue OTC BPO wash every day in the shower  --Continue clindamycin gel QAM  --Increase to tretinoin 0.1% cream daily. Discussed potential for dryness/irritation. Advised to use emollients if needed.            Pt advised on use and risks including photosensitivity, allergic reactions, GI upset, headaches, nausea, erythema, scaling, vertigo, asthralgias, blood clots:Yes    Follow-up: 3-4 months  CC:   Scribed By: Kadi Smith, MS, PA-C

## 2021-10-03 ENCOUNTER — HEALTH MAINTENANCE LETTER (OUTPATIENT)
Age: 21
End: 2021-10-03

## 2021-10-07 NOTE — PROGRESS NOTES
Assessment & Plan     Erythematous rash  Appears to be resolving.    Likely dermatitis, no specific triggers  Given topical steroid to use PRN - discussed risks with prolonged use including skin atrophy.  Do not use on the face.    Follow-up if not improving but is resolving.   No concerns with toenails today, no signs of onychomycosis.   - triamcinolone (KENALOG) 0.1 % external ointment; Apply topically 2 times daily    Need for prophylactic vaccination and inoculation against influenza  Declined other vaccinations today but will come in for them soon.   - INFLUENZA VACCINE IM > 6 MONTHS VALENT IIV4 (AFLURIA/FLUZONE)      Return in about 4 weeks (around 11/5/2021) for Vaccination update.     Options for treatment and follow-up care were reviewed with the patient and/or guardian. Patient and/or guardian engaged in the decision making process and verbalized understanding of the options discussed and agreed with the final plan.     OMAR [facility] LYNNE Fam is a 21 year old who presents for the following health issues   HPI     Rash  Onset/Duration: at least week  Description  Location: right side of neck  Character: blotchy, red  Itching: no  Intensity:  mild  Progression of Symptoms:  improving  Accompanying signs and symptoms:   Fever: no  Body aches or joint pain: no  Sore throat symptoms: no  Recent cold symptoms: no  History:           Previous episodes of similar rash: None  New exposures:  None  Recent travel: no  Exposure to similar rash: no  Precipitating or alleviating factors: none   Therapies tried and outcome: moisturizer     - Starting to go away, no pain or itching.   - She thought she also had toenail fungus recently so used some OTC therapies but now it looks ok.     Review of Systems   Constitutional, skin, msk, neuro, psych systems are negative, except as otherwise noted.      Objective    /72   Pulse 99   Temp 98.1  F (36.7  C)  (Temporal)   Resp 18   Wt 64 kg (141 lb)   SpO2 99%   BMI 24.98 kg/m    Body mass index is 24.98 kg/m .  Physical Exam   GENERAL: healthy, alert and no distress  MS: no gross musculoskeletal defects noted, no edema  SKIN: Right side neck, very faint pink macular oval shaped area approximately 1 cm in size.   PSYCH: mentation appears normal, affect normal/bright

## 2021-10-08 ENCOUNTER — OFFICE VISIT (OUTPATIENT)
Dept: FAMILY MEDICINE | Facility: OTHER | Age: 21
End: 2021-10-08
Payer: COMMERCIAL

## 2021-10-08 VITALS
HEART RATE: 99 BPM | BODY MASS INDEX: 24.98 KG/M2 | RESPIRATION RATE: 18 BRPM | TEMPERATURE: 98.1 F | SYSTOLIC BLOOD PRESSURE: 104 MMHG | DIASTOLIC BLOOD PRESSURE: 72 MMHG | OXYGEN SATURATION: 99 % | WEIGHT: 141 LBS

## 2021-10-08 DIAGNOSIS — R21 ERYTHEMATOUS RASH: Primary | ICD-10-CM

## 2021-10-08 DIAGNOSIS — Z23 NEED FOR PROPHYLACTIC VACCINATION AND INOCULATION AGAINST INFLUENZA: ICD-10-CM

## 2021-10-08 PROCEDURE — 90686 IIV4 VACC NO PRSV 0.5 ML IM: CPT | Performed by: PHYSICIAN ASSISTANT

## 2021-10-08 PROCEDURE — 90471 IMMUNIZATION ADMIN: CPT | Performed by: PHYSICIAN ASSISTANT

## 2021-10-08 PROCEDURE — 99213 OFFICE O/P EST LOW 20 MIN: CPT | Mod: 25 | Performed by: PHYSICIAN ASSISTANT

## 2021-10-08 RX ORDER — TRIAMCINOLONE ACETONIDE 1 MG/G
OINTMENT TOPICAL 2 TIMES DAILY
Qty: 15 G | Refills: 0 | Status: SHIPPED | OUTPATIENT
Start: 2021-10-08 | End: 2024-01-26

## 2022-02-03 ENCOUNTER — IMMUNIZATION (OUTPATIENT)
Dept: FAMILY MEDICINE | Facility: CLINIC | Age: 22
End: 2022-02-03
Payer: COMMERCIAL

## 2022-02-03 PROCEDURE — 91306 COVID-19,PF,MODERNA (18+ YRS BOOSTER .25ML): CPT

## 2022-02-03 PROCEDURE — 0064A COVID-19,PF,MODERNA (18+ YRS BOOSTER .25ML): CPT

## 2022-05-15 ENCOUNTER — HEALTH MAINTENANCE LETTER (OUTPATIENT)
Age: 22
End: 2022-05-15

## 2022-08-04 ENCOUNTER — OFFICE VISIT (OUTPATIENT)
Dept: DERMATOLOGY | Facility: CLINIC | Age: 22
End: 2022-08-04
Payer: COMMERCIAL

## 2022-08-04 DIAGNOSIS — B07.8 VERRUCA PLANA: ICD-10-CM

## 2022-08-04 DIAGNOSIS — D23.9 DERMAL NEVUS: Primary | ICD-10-CM

## 2022-08-04 DIAGNOSIS — L70.0 ACNE VULGARIS: ICD-10-CM

## 2022-08-04 PROCEDURE — 99213 OFFICE O/P EST LOW 20 MIN: CPT | Mod: 25 | Performed by: PHYSICIAN ASSISTANT

## 2022-08-04 PROCEDURE — 17110 DESTRUCTION B9 LES UP TO 14: CPT | Performed by: PHYSICIAN ASSISTANT

## 2022-08-04 RX ORDER — CLINDAMYCIN PHOSPHATE 10 MG/G
GEL TOPICAL 2 TIMES DAILY
Qty: 60 G | Refills: 11 | Status: SHIPPED | OUTPATIENT
Start: 2022-08-04

## 2022-08-04 RX ORDER — TRETINOIN 1 MG/G
CREAM TOPICAL
Qty: 45 G | Refills: 11 | Status: SHIPPED | OUTPATIENT
Start: 2022-08-04 | End: 2023-11-27

## 2022-08-04 ASSESSMENT — PAIN SCALES - GENERAL: PAINLEVEL: NO PAIN (0)

## 2022-08-04 NOTE — PROGRESS NOTES
HPI:   CC: Cristel Grissom is a pleasant 22 year old female who presents for recheck of acne. She is currently using tretinoin 0.1% cream on her face and clindamycin. She is using BPO on her back. She is doing well overall.      Additionally she would like 3 moles on her back checked. She also has some small warts on her hands she would like treated.     Current Outpatient Medications   Medication Sig Dispense Refill     clindamycin (CLINDAMAX) 1 % external gel Apply topically 2 times daily 60 g 11     sertraline (ZOLOFT) 25 MG tablet Take 25 tablets by mouth daily       sertraline (ZOLOFT) 50 MG tablet Take 50 mg by mouth daily Unsure of dose. Prescribed by psychiatry.       tretinoin (RETIN-A) 0.1 % external cream Apply a pea size to entire face QD 45 g 11     hydrOXYzine (ATARAX) 25 MG tablet Take 1 tablet at bedtime and another 1 tablet daily as needed (Patient not taking: Reported on 8/4/2022) 240 tablet 0     triamcinolone (KENALOG) 0.1 % external ointment Apply topically 2 times daily (Patient not taking: Reported on 8/4/2022) 15 g 0     Allergies   Allergen Reactions     Bactrim [Sulfamethoxazole W/Trimethoprim]      When very young, vomiting likely per mom     Cefzil [Cefprozil]      When very young, vomiting likely per mom     Penicillins      When very young, vomiting likely per mom     Sulfa Drugs      When very young, vomiting likely per mom     Denies any other skin complaints, in general feels well: Yes  Review of symptoms otherwise negative:Yes    PHYSICAL EXAM:   A&Ox3: Yes   Well developed/well nourished female Yes   Mood appropriate Yes      There were no vitals taken for this visit.  Type 2 skin. Mood appropriate  Alert and Oriented X 3. Well developed, well nourished in no distress.  General appearance: Normal  Head including face: Normal  Eyes: conjunctiva and lids: Normal  Mouth: Lips, teeth, gums: Normal  Neck: Normal  Skin: Scalp and body hair: See below     Comedones Papules/Pustules Cysts  Staining Scarring   Face/Neck 1+ 0 0 1+ 0   Chest 1-2+ 0 0 0 0   Back 1-2+ 1+ 0 0 0     Telangiectasias: No Fixed Erythema: No Exoriations: No   Other Physical Exam Findings:  Brown fleshy papules on the back  Small verrucous papules on the hands x 5  ASSESSMENT & PLAN:     1. Acne Vulgaris - Doing well overall. She is pleased with results.  She is not good at taking pills and would like to stay off of any oral medications at this point. She tends to be oily and not sensitive. Discussed topical spironolactone (and PO jenn) if struggling in the future.     --Continue OTC BPO wash every day in the shower  --Continue clindamycin gel QAM  --Continue tretinoin 0.1% cream daily. Discussed potential for dryness/irritation. Advised to use emollients if needed.    2. Dermal nevi - advised benign no treatment needed    3. Verruca plana on the hands. Irritated per patient so cryosurgery performed. Advised on blistering and post op care.           Pt advised on use and risks including photosensitivity, allergic reactions, GI upset, headaches, nausea, erythema, scaling, vertigo, asthralgias, blood clots:Yes    Follow-up: yearly if doing well/sooner if struggling  CC:   Scribed By: Kadi Smith, MS, PA-C

## 2022-08-04 NOTE — LETTER
8/4/2022         RE: Cristel Grissom  [address] [facility] [address]        Dear Colleague,    Thank you for referring your patient, Cristel Grissom, to the [facility]. Please see a copy of my visit note below.    HPI:   CC: Cristel Grissom is a pleasant 22 year old female who presents for recheck of acne. She is currently using tretinoin 0.1% cream on her face and clindamycin. She is using BPO on her back. She is doing well overall.      Additionally she would like 3 moles on her back checked. She also has some small warts on her hands she would like treated.     Current Outpatient Medications   Medication Sig Dispense Refill     clindamycin (CLINDAMAX) 1 % external gel Apply topically 2 times daily 60 g 11     sertraline (ZOLOFT) 25 MG tablet Take 25 tablets by mouth daily       sertraline (ZOLOFT) 50 MG tablet Take 50 mg by mouth daily Unsure of dose. Prescribed by psychiatry.       tretinoin (RETIN-A) 0.1 % external cream Apply a pea size to entire face QD 45 g 11     hydrOXYzine (ATARAX) 25 MG tablet Take 1 tablet at bedtime and another 1 tablet daily as needed (Patient not taking: Reported on 8/4/2022) 240 tablet 0     triamcinolone (KENALOG) 0.1 % external ointment Apply topically 2 times daily (Patient not taking: Reported on 8/4/2022) 15 g 0     Allergies   Allergen Reactions     Bactrim [Sulfamethoxazole W/Trimethoprim]      When very young, vomiting likely per mom     Cefzil [Cefprozil]      When very young, vomiting likely per mom     Penicillins      When very young, vomiting likely per mom     Sulfa Drugs      When very young, vomiting likely per mom     Denies any other skin complaints, in general feels well: Yes  Review of symptoms otherwise negative:Yes    PHYSICAL EXAM:   A&Ox3: Yes   Well developed/well nourished female Yes   Mood appropriate Yes      There were no vitals taken for this visit.  Type 2 skin. Mood appropriate  Alert and Oriented X 3. Well  developed, well nourished in no distress.  General appearance: Normal  Head including face: Normal  Eyes: conjunctiva and lids: Normal  Mouth: Lips, teeth, gums: Normal  Neck: Normal  Skin: Scalp and body hair: See below     Comedones Papules/Pustules Cysts Staining Scarring   Face/Neck 1+ 0 0 1+ 0   Chest 1-2+ 0 0 0 0   Back 1-2+ 1+ 0 0 0     Telangiectasias: No Fixed Erythema: No Exoriations: No   Other Physical Exam Findings:  Brown fleshy papules on the back  Small verrucous papules on the hands x 5  ASSESSMENT & PLAN:     1. Acne Vulgaris - Doing well overall. She is pleased with results.  She is not good at taking pills and would like to stay off of any oral medications at this point. She tends to be oily and not sensitive. Discussed topical spironolactone (and PO jenn) if struggling in the future.     --Continue OTC BPO wash every day in the shower  --Continue clindamycin gel QAM  --Continue tretinoin 0.1% cream daily. Discussed potential for dryness/irritation. Advised to use emollients if needed.    2. Dermal nevi - advised benign no treatment needed    3. Verruca plana on the hands. Irritated per patient so cryosurgery performed. Advised on blistering and post op care.           Pt advised on use and risks including photosensitivity, allergic reactions, GI upset, headaches, nausea, erythema, scaling, vertigo, asthralgias, blood clots:Yes    Follow-up: yearly if doing well/sooner if struggling  CC:   Scribed By: Kadi Smith, MS, PATODD      Again, thank you for allowing me to participate in the care of your patient.        Sincerely,        Kadi Smith PA-C

## 2022-08-13 ENCOUNTER — LAB (OUTPATIENT)
Dept: URGENT CARE | Facility: URGENT CARE | Age: 22
End: 2022-08-13
Attending: PHYSICIAN ASSISTANT
Payer: COMMERCIAL

## 2022-08-13 DIAGNOSIS — Z20.822 SUSPECTED 2019 NOVEL CORONAVIRUS INFECTION: ICD-10-CM

## 2022-08-13 PROCEDURE — U0003 INFECTIOUS AGENT DETECTION BY NUCLEIC ACID (DNA OR RNA); SEVERE ACUTE RESPIRATORY SYNDROME CORONAVIRUS 2 (SARS-COV-2) (CORONAVIRUS DISEASE [COVID-19]), AMPLIFIED PROBE TECHNIQUE, MAKING USE OF HIGH THROUGHPUT TECHNOLOGIES AS DESCRIBED BY CMS-2020-01-R: HCPCS

## 2022-08-13 PROCEDURE — U0005 INFEC AGEN DETEC AMPLI PROBE: HCPCS

## 2022-08-14 LAB — SARS-COV-2 RNA RESP QL NAA+PROBE: POSITIVE

## 2022-09-01 ENCOUNTER — OFFICE VISIT (OUTPATIENT)
Dept: FAMILY MEDICINE | Facility: OTHER | Age: 22
End: 2022-09-01
Payer: COMMERCIAL

## 2022-09-01 VITALS
HEART RATE: 100 BPM | SYSTOLIC BLOOD PRESSURE: 122 MMHG | BODY MASS INDEX: 24.45 KG/M2 | DIASTOLIC BLOOD PRESSURE: 62 MMHG | OXYGEN SATURATION: 97 % | HEIGHT: 63 IN | TEMPERATURE: 97 F | RESPIRATION RATE: 18 BRPM | WEIGHT: 138 LBS

## 2022-09-01 DIAGNOSIS — N89.8 VAGINAL ITCHING: Primary | ICD-10-CM

## 2022-09-01 DIAGNOSIS — F41.1 GAD (GENERALIZED ANXIETY DISORDER): ICD-10-CM

## 2022-09-01 DIAGNOSIS — F33.2 SEVERE EPISODE OF RECURRENT MAJOR DEPRESSIVE DISORDER, WITHOUT PSYCHOTIC FEATURES (H): ICD-10-CM

## 2022-09-01 DIAGNOSIS — Z11.59 NEED FOR HEPATITIS C SCREENING TEST: ICD-10-CM

## 2022-09-01 DIAGNOSIS — Z11.3 SCREENING FOR STDS (SEXUALLY TRANSMITTED DISEASES): ICD-10-CM

## 2022-09-01 LAB
CLUE CELLS: ABNORMAL
TRICHOMONAS, WET PREP: ABNORMAL
WBC'S/HIGH POWER FIELD, WET PREP: ABNORMAL
YEAST, WET PREP: ABNORMAL

## 2022-09-01 PROCEDURE — 87210 SMEAR WET MOUNT SALINE/INK: CPT | Performed by: FAMILY MEDICINE

## 2022-09-01 PROCEDURE — 99213 OFFICE O/P EST LOW 20 MIN: CPT | Performed by: FAMILY MEDICINE

## 2022-09-01 ASSESSMENT — PATIENT HEALTH QUESTIONNAIRE - PHQ9
SUM OF ALL RESPONSES TO PHQ QUESTIONS 1-9: 2
SUM OF ALL RESPONSES TO PHQ QUESTIONS 1-9: 2
10. IF YOU CHECKED OFF ANY PROBLEMS, HOW DIFFICULT HAVE THESE PROBLEMS MADE IT FOR YOU TO DO YOUR WORK, TAKE CARE OF THINGS AT HOME, OR GET ALONG WITH OTHER PEOPLE: NOT DIFFICULT AT ALL

## 2022-09-01 ASSESSMENT — PAIN SCALES - GENERAL: PAINLEVEL: MILD PAIN (2)

## 2022-09-01 NOTE — PROGRESS NOTES
Assessment & Plan       ICD-10-CM    1. Vaginal itching  N89.8 Wet prep   2. Severe episode of recurrent major depressive disorder, without psychotic features (H)  F33.2    3. Need for hepatitis C screening test  Z11.59    4. Screening for STDs (sexually transmitted diseases)  Z11.3    5. SUSU (generalized anxiety disorder)  F41.1      Patient had taken Vistaril for her anxiety for which she did have an increase in vaginal dryness.  She did take a bubble last to see if this would help and discovered that it actually made it significantly worse.  She has been improving since she made the appointment a few days ago but would like the area looked at for any other issues.  Work-up was done as well today to evaluate for these infections which also increased when excessively stressed.  She is working with psychiatry and feels like she has the resources for managing her anxiety with them.  Discussed using protective barrier cream as after cleaning and drying the area without need for ordered labs.  Though the excessive discharge may be due to her dryness and will work with psychiatry over her Vistaril continue to use    10 minutes spent on the date of the encounter doing chart review, history and exam, documentation and further activities per the note           No follow-ups on file.    Kala Chavez MD, MD  [facility] LYNNE Fam is a 22 year old, presenting for the following health issues:  Vaginal Problem    Patient is stating she is very dry- she also took a bubble bath this weekend.     Vaginal Problem     History of Present Illness       Reason for visit:  Vaginal irritation and inflammation  Symptom onset:  1-3 days ago  Symptoms include:  Itchy, inflammation  Symptom intensity:  Mild  Symptom progression:  Staying the same  Had these symptoms before:  No  What makes it worse:  Not sure  What makes it better:  Not sure    She eats 2-3 servings of fruits and vegetables daily.She  "consumes 1 sweetened beverage(s) daily.She exercises with enough effort to increase her heart rate 9 or less minutes per day.  She exercises with enough effort to increase her heart rate 3 or less days per week.   She is taking medications regularly.    Today's PHQ-9         PHQ-9 Total Score: 2    PHQ-9 Q9 Thoughts of better off dead/self-harm past 2 weeks :   Not at all    How difficult have these problems made it for you to do your work, take care of things at home, or get along with other people: Not difficult at all             Review of Systems   Genitourinary: Positive for vaginal discharge.      Constitutional, HEENT, cardiovascular, pulmonary, GI, , musculoskeletal, neuro, skin, endocrine and psych systems are negative, except as otherwise noted.      Objective    /62   Pulse 100   Temp 97  F (36.1  C) (Temporal)   Resp 18   Ht 1.605 m (5' 3.19\")   Wt 62.6 kg (138 lb)   SpO2 97%   BMI 24.30 kg/m    Body mass index is 24.3 kg/m .  Physical Exam   GENERAL: healthy, alert and no distress  CV: regular rate and rhythm, normal S1 S2, no S3 or S4, no murmur, click or rub, no peripheral edema and peripheral pulses strong   (female): mild vaginal discharge and irritation, otherwise normal female external genitalia, normal urethral meatus, vaginal mucosa, normal cervix/adnexa/uterus without masses or discharge  MS: no gross musculoskeletal defects noted, no edema                    .  ..  "

## 2022-09-10 ENCOUNTER — HEALTH MAINTENANCE LETTER (OUTPATIENT)
Age: 22
End: 2022-09-10

## 2022-09-15 ENCOUNTER — HOSPITAL ENCOUNTER (EMERGENCY)
Facility: CLINIC | Age: 22
Discharge: HOME | End: 2022-09-15
Attending: EMERGENCY MEDICINE | Admitting: EMERGENCY MEDICINE
Payer: OTHER MISCELLANEOUS

## 2022-09-15 VITALS
RESPIRATION RATE: 22 BRPM | TEMPERATURE: 98.6 F | DIASTOLIC BLOOD PRESSURE: 74 MMHG | SYSTOLIC BLOOD PRESSURE: 114 MMHG | HEART RATE: 89 BPM | OXYGEN SATURATION: 98 %

## 2022-09-15 DIAGNOSIS — S76.911A MUSCLE STRAIN OF RIGHT THIGH, INITIAL ENCOUNTER: ICD-10-CM

## 2022-09-15 PROCEDURE — 99282 EMERGENCY DEPT VISIT SF MDM: CPT | Performed by: EMERGENCY MEDICINE

## 2022-09-15 ASSESSMENT — ENCOUNTER SYMPTOMS
JOINT SWELLING: 0
CARDIOVASCULAR NEGATIVE: 1
DIFFICULTY URINATING: 0
COUGH: 0
RESPIRATORY NEGATIVE: 1
LIGHT-HEADEDNESS: 0
CHILLS: 0
HEADACHES: 0
NUMBNESS: 0
APPETITE CHANGE: 0
FEVER: 0
MYALGIAS: 1
WEAKNESS: 0

## 2022-09-15 NOTE — ED PROVIDER NOTES
History     Chief Complaint   Patient presents with     Knee Pain     HPI  Cristel Grissom is a 22 year old female who arrives tonight with mother for evaluation of right medial thigh pain x1 day.  The patient reports she is unsure if she strained or injured this while at work.  She does states she lifts heavy objects.  Patient reports some minor discomfort in the medial aspect of her leg and at times her right knee clicks.  She feels that she does have an abnormal sensation when she lifts the leg.  She reports no loss of bowel or bladder control.  She reports no loss of sensation or physical weakness.  She was able to walk on this.  No recent back pain or trauma.  She reports no recent viral syndrome.  The patient reports no distal change in strength or sensation to her lower extremities no temperature change.  No history of back surgery or similar symptoms in the past.    Allergies:  Allergies   Allergen Reactions     Bactrim [Sulfamethoxazole W/Trimethoprim]      When very young, vomiting likely per mom     Cefzil [Cefprozil]      When very young, vomiting likely per mom     Penicillins      When very young, vomiting likely per mom     Sulfa Drugs      When very young, vomiting likely per mom       Problem List:    Patient Active Problem List    Diagnosis Date Noted     MDD (major depressive disorder), recurrent episode (H) 08/26/2020     Priority: Medium     Consumes a vegan diet - beans as a source of protein, multivit with iron 07/15/2020     Priority: Medium     Dizziness 07/15/2020     Priority: Medium     Malaise and fatigue 07/15/2020     Priority: Medium     Stomach upset 07/15/2020     Priority: Medium     Chest discomfort - central pressure 07/15/2020     Priority: Medium     Right eye injury, initial encounter 08/09/2019     Priority: Medium     Acne vulgaris 01/25/2017     Priority: Medium     SUSU (generalized anxiety disorder) 01/25/2017     Priority: Medium        Past Medical History:    Past  Medical History:   Diagnosis Date     Adjustment disorder      Anxiety      Depression      PTSD (post-traumatic stress disorder)        Past Surgical History:    Past Surgical History:   Procedure Laterality Date     WISDOM TOOTH EXTRACTION         Family History:    Family History   Problem Relation Age of Onset     Substance Abuse Maternal Uncle      Melanoma No family hx of        Social History:  Marital Status:  Single [1]  Social History     Tobacco Use     Smoking status: Never Smoker     Smokeless tobacco: Never Used   Vaping Use     Vaping Use: Never used   Substance Use Topics     Alcohol use: No     Drug use: Not Currently        Medications:    clindamycin (CLINDAMAX) 1 % external gel  hydrOXYzine (ATARAX) 25 MG tablet  sertraline (ZOLOFT) 25 MG tablet  sertraline (ZOLOFT) 50 MG tablet  tretinoin (RETIN-A) 0.1 % external cream  triamcinolone (KENALOG) 0.1 % external ointment          Review of Systems   Constitutional: Negative for appetite change, chills and fever.   HENT: Negative.  Negative for congestion.    Respiratory: Negative.  Negative for cough.    Cardiovascular: Negative.  Negative for chest pain and leg swelling.   Genitourinary: Negative.  Negative for difficulty urinating.   Musculoskeletal: Positive for myalgias. Negative for gait problem and joint swelling.   Neurological: Negative for weakness, light-headedness, numbness and headaches.   All other systems reviewed and are negative.      Physical Exam   BP: (!) 138/92  Pulse: 89  Temp: 98.6  F (37  C)  Resp: 22  SpO2: 98 %      Physical Exam  Vitals reviewed.   Constitutional:       General: She is not in acute distress.     Appearance: She is normal weight. She is not ill-appearing, toxic-appearing or diaphoretic.   Musculoskeletal:         General: Normal range of motion.      Right upper leg: No deformity.      Right knee: Normal. No deformity, bony tenderness or crepitus. Normal range of motion. No tenderness.      Right lower leg:  No tenderness or bony tenderness.        Legs:    Skin:     General: Skin is warm and dry.      Capillary Refill: Capillary refill takes less than 2 seconds.      Coloration: Skin is not jaundiced or pale.      Findings: No erythema or rash.   Neurological:      General: No focal deficit present.      Mental Status: She is alert and oriented to person, place, and time. Mental status is at baseline.      Cranial Nerves: No cranial nerve deficit.      Sensory: No sensory deficit.      Motor: No weakness.      Coordination: Coordination normal.      Deep Tendon Reflexes: Babinski sign absent on the right side. Babinski sign absent on the left side.      Reflex Scores:       Patellar reflexes are 2+ on the right side and 2+ on the left side.  Psychiatric:         Mood and Affect: Affect is tearful.         Behavior: Behavior normal. Behavior is cooperative.         Thought Content: Thought content normal.         Cognition and Memory: Cognition and memory normal.         ED Course                 Procedures             No results found for this or any previous visit (from the past 24 hour(s)).    Medications - No data to display    Assessments & Plan (with Medical Decision Making)     I have reviewed the nursing notes.    I have reviewed the findings, diagnosis, plan and need for follow up with the patient.    Cristel Grissom is a 22 year old female who arrives [facility] with mother for evaluation of right medial thigh pain x1 day.  On arrival patient noted to be alert.  She is presently afebrile and hemodynamically stable lying supine in bed.  By history this sounds most consistent with a minor muscular strain.  On evaluation her strength and sensation are full and without limitation.  Babinski downgoing.  Patellar reflex symmetric and brisk bilaterally.  I do not appreciate any signs of neurologic deficit.  Low suspicion for cauda equina, conus medullaris or cord compression.  Unlikely to be Guillain-Barré.  This is  not consistent with a stroke I do not believe she requires MRI.  Mother does add that the patient is very anxious and has been dealing with mental health issues with concern of frequent similar symptoms.  I did discuss and reassured patient's that I do not suspect an acute vascular or neurologic issue.  We did discuss RICE and plan for home-going management.  However should the patient have any change, progression or worsening symptoms she would call or return emergently.    New Prescriptions    No medications on file       Final diagnoses:   Muscle strain of right thigh, initial encounter       9/15/2022   [facility] EMERGENCY DEPT     Josh Case MD  09/15/22 0303

## 2022-09-15 NOTE — DISCHARGE INSTRUCTIONS
At this time as we discussed in the emergency department your symptoms are most consistent with a muscular strain.  I am happy and reassured by your evaluation with intact reflexes, strength and sensation.  As we discussed the likelihood of something more dangerous or progressive is very low.  I would plan for regular RICE to include rest, elevation, anti-inflammatory medication such as ibuprofen over the next 2 to 3 days.  I would anticipate gradual improvement of symptoms.  Should you notice a change in leg temperature, loss of sensation, loss of control or bowel or bladder, weakness such as inability to pick leg off of bed or walk we would want you to return emergently albeit these symptoms presenting would be very unlikely.  I suspect will be of gradual improvement over the next several days and resolution of symptoms without intervention.

## 2022-09-15 NOTE — ED TRIAGE NOTES
Triage Assessment     Row Name 09/15/22 0208       Triage Assessment (Adult)    Airway WDL WDL       Respiratory WDL    Respiratory WDL WDL       Skin Circulation/Temperature WDL    Skin Circulation/Temperature WDL WDL       Cardiac WDL    Cardiac WDL WDL       Peripheral/Neurovascular WDL    Peripheral Neurovascular WDL WDL       Cognitive/Neuro/Behavioral WDL    Cognitive/Neuro/Behavioral WDL WDL

## 2022-09-19 ENCOUNTER — HOSPITAL ENCOUNTER (EMERGENCY)
Facility: CLINIC | Age: 22
Discharge: HOME | End: 2022-09-19
Attending: EMERGENCY MEDICINE | Admitting: EMERGENCY MEDICINE
Payer: OTHER MISCELLANEOUS

## 2022-09-19 ENCOUNTER — NURSE TRIAGE (OUTPATIENT)
Dept: FAMILY MEDICINE | Facility: OTHER | Age: 22
End: 2022-09-19

## 2022-09-19 ENCOUNTER — APPOINTMENT (OUTPATIENT)
Dept: GENERAL RADIOLOGY | Facility: CLINIC | Age: 22
End: 2022-09-19
Attending: EMERGENCY MEDICINE
Payer: OTHER MISCELLANEOUS

## 2022-09-19 VITALS
RESPIRATION RATE: 19 BRPM | HEART RATE: 75 BPM | SYSTOLIC BLOOD PRESSURE: 102 MMHG | DIASTOLIC BLOOD PRESSURE: 62 MMHG | WEIGHT: 140 LBS | BODY MASS INDEX: 24.65 KG/M2 | OXYGEN SATURATION: 99 % | TEMPERATURE: 97.9 F

## 2022-09-19 DIAGNOSIS — M25.561 ACUTE PAIN OF RIGHT KNEE: ICD-10-CM

## 2022-09-19 DIAGNOSIS — M54.16 LUMBAR RADICULOPATHY: ICD-10-CM

## 2022-09-19 LAB
ALBUMIN SERPL-MCNC: 4 G/DL (ref 3.4–5)
ALP SERPL-CCNC: 64 U/L (ref 40–150)
ALT SERPL W P-5'-P-CCNC: 34 U/L (ref 0–50)
ANION GAP SERPL CALCULATED.3IONS-SCNC: 6 MMOL/L (ref 3–14)
AST SERPL W P-5'-P-CCNC: 32 U/L (ref 0–45)
BASOPHILS # BLD AUTO: 0.1 10E3/UL (ref 0–0.2)
BASOPHILS NFR BLD AUTO: 1 %
BILIRUB SERPL-MCNC: 0.3 MG/DL (ref 0.2–1.3)
BUN SERPL-MCNC: 17 MG/DL (ref 7–30)
CALCIUM SERPL-MCNC: 9.2 MG/DL (ref 8.5–10.1)
CHLORIDE BLD-SCNC: 109 MMOL/L (ref 94–109)
CO2 SERPL-SCNC: 24 MMOL/L (ref 20–32)
CREAT SERPL-MCNC: 0.56 MG/DL (ref 0.52–1.04)
D DIMER PPP FEU-MCNC: 0.47 UG/ML FEU (ref 0–0.5)
EOSINOPHIL # BLD AUTO: 0.1 10E3/UL (ref 0–0.7)
EOSINOPHIL NFR BLD AUTO: 1 %
ERYTHROCYTE [DISTWIDTH] IN BLOOD BY AUTOMATED COUNT: 12 % (ref 10–15)
GFR SERPL CREATININE-BSD FRML MDRD: >90 ML/MIN/1.73M2
GLUCOSE BLD-MCNC: 86 MG/DL (ref 70–99)
HCT VFR BLD AUTO: 45.6 % (ref 35–47)
HGB BLD-MCNC: 15.5 G/DL (ref 11.7–15.7)
IMM GRANULOCYTES # BLD: 0 10E3/UL
IMM GRANULOCYTES NFR BLD: 0 %
INR PPP: 1.12 (ref 0.85–1.15)
LYMPHOCYTES # BLD AUTO: 1.5 10E3/UL (ref 0.8–5.3)
LYMPHOCYTES NFR BLD AUTO: 27 %
MCH RBC QN AUTO: 30.9 PG (ref 26.5–33)
MCHC RBC AUTO-ENTMCNC: 34 G/DL (ref 31.5–36.5)
MCV RBC AUTO: 91 FL (ref 78–100)
MONOCYTES # BLD AUTO: 0.5 10E3/UL (ref 0–1.3)
MONOCYTES NFR BLD AUTO: 8 %
NEUTROPHILS # BLD AUTO: 3.5 10E3/UL (ref 1.6–8.3)
NEUTROPHILS NFR BLD AUTO: 63 %
NRBC # BLD AUTO: 0 10E3/UL
NRBC BLD AUTO-RTO: 0 /100
PLATELET # BLD AUTO: 238 10E3/UL (ref 150–450)
POTASSIUM BLD-SCNC: 4.1 MMOL/L (ref 3.4–5.3)
PROT SERPL-MCNC: 7.9 G/DL (ref 6.8–8.8)
RBC # BLD AUTO: 5.01 10E6/UL (ref 3.8–5.2)
SODIUM SERPL-SCNC: 139 MMOL/L (ref 133–144)
WBC # BLD AUTO: 5.6 10E3/UL (ref 4–11)

## 2022-09-19 PROCEDURE — 85610 PROTHROMBIN TIME: CPT | Performed by: EMERGENCY MEDICINE

## 2022-09-19 PROCEDURE — 85379 FIBRIN DEGRADATION QUANT: CPT | Performed by: EMERGENCY MEDICINE

## 2022-09-19 PROCEDURE — 96374 THER/PROPH/DIAG INJ IV PUSH: CPT | Performed by: EMERGENCY MEDICINE

## 2022-09-19 PROCEDURE — 36415 COLL VENOUS BLD VENIPUNCTURE: CPT | Performed by: EMERGENCY MEDICINE

## 2022-09-19 PROCEDURE — 85025 COMPLETE CBC W/AUTO DIFF WBC: CPT | Performed by: EMERGENCY MEDICINE

## 2022-09-19 PROCEDURE — 73562 X-RAY EXAM OF KNEE 3: CPT | Mod: RT

## 2022-09-19 PROCEDURE — 96361 HYDRATE IV INFUSION ADD-ON: CPT | Performed by: EMERGENCY MEDICINE

## 2022-09-19 PROCEDURE — 258N000003 HC RX IP 258 OP 636: Performed by: EMERGENCY MEDICINE

## 2022-09-19 PROCEDURE — 80053 COMPREHEN METABOLIC PANEL: CPT | Performed by: EMERGENCY MEDICINE

## 2022-09-19 PROCEDURE — 99285 EMERGENCY DEPT VISIT HI MDM: CPT | Performed by: EMERGENCY MEDICINE

## 2022-09-19 PROCEDURE — 99285 EMERGENCY DEPT VISIT HI MDM: CPT | Mod: 25 | Performed by: EMERGENCY MEDICINE

## 2022-09-19 PROCEDURE — 250N000011 HC RX IP 250 OP 636: Performed by: EMERGENCY MEDICINE

## 2022-09-19 RX ORDER — DIAZEPAM 10 MG/2ML
5 INJECTION, SOLUTION INTRAMUSCULAR; INTRAVENOUS ONCE
Status: COMPLETED | OUTPATIENT
Start: 2022-09-19 | End: 2022-09-19

## 2022-09-19 RX ORDER — SODIUM CHLORIDE 9 MG/ML
INJECTION, SOLUTION INTRAVENOUS CONTINUOUS
Status: DISCONTINUED | OUTPATIENT
Start: 2022-09-19 | End: 2022-09-19 | Stop reason: HOSPADM

## 2022-09-19 RX ORDER — METHYLPREDNISOLONE 4 MG
TABLET, DOSE PACK ORAL
Qty: 21 TABLET | Refills: 0 | Status: SHIPPED | OUTPATIENT
Start: 2022-09-19 | End: 2022-10-07

## 2022-09-19 RX ORDER — ACETAMINOPHEN 325 MG/1
650 TABLET ORAL EVERY 6 HOURS PRN
COMMUNITY
End: 2022-10-07

## 2022-09-19 RX ORDER — CYCLOBENZAPRINE HCL 10 MG
10 TABLET ORAL 3 TIMES DAILY PRN
Qty: 20 TABLET | Refills: 0 | Status: SHIPPED | OUTPATIENT
Start: 2022-09-19 | End: 2022-09-25

## 2022-09-19 RX ADMIN — SODIUM CHLORIDE 1000 ML: 9 INJECTION, SOLUTION INTRAVENOUS at 13:16

## 2022-09-19 RX ADMIN — DIAZEPAM 5 MG: 5 INJECTION, SOLUTION INTRAMUSCULAR; INTRAVENOUS at 13:17

## 2022-09-19 ASSESSMENT — ACTIVITIES OF DAILY LIVING (ADL)
ADLS_ACUITY_SCORE: 36
ADLS_ACUITY_SCORE: 33

## 2022-09-19 NOTE — DISCHARGE INSTRUCTIONS
Your blood work is reassuring.  There is no evidence of blood clot.  Your x-ray does not show any acute abnormality of the knee.  If you have irritation of the nerve in your back causing your leg pain  You definitely had some spasm of the neck and back muscles.  Medrol Dosepak as directed.  Take this with food so you do not upset your stomach.  This can cause insomnia but this will resolve once the steroids are discontinued.  You can take Flexeril as a muscle relaxant.  Ice or heat if helpful.  See your doctor next week if you have persistent symptoms as you may need further treatment such as physical therapy or further evaluation with an MRI.

## 2022-09-19 NOTE — ED PROVIDER NOTES
History     Chief Complaint   Patient presents with     Back Pain     Leg Pain     HPI  Cristel Grissom is a 22 year old female who presents with worsening right leg pain.  Patient does not recall specific injury but symptoms began after she was bending down at [employer] where she was working.  When she stood up she experienced some discomfort in her medial thigh and knee area.  She did not have any problems with loss of bowel or bladder.  No loss of sensation or weakness in the leg.  She is able ambulate.  Diagnosed with a muscle strain of the right thigh.  Presents today with persistent and worsening leg pain rating all the way down to her foot and all the way up her back to her neck.  No headache or visual change.  No change in hearing.  No problems with speech or swallowing.  Denies chest pain, shortness of the cough.  No abdominal pain, nausea or vomiting.  No diarrhea or dysuria.  No saddle paresthesia or loss of bowel or bladder.  Able ambulate.  Certain positions of the knee makes symptoms worse.  Treatment for symptoms prior to arrival today.  Patient admits she does have some anxiety issues but does not think this is related.  Just over a year ago she was seen in the ER for some paresthesias and had a normal CT and subsequent MRI of the brain.  No history of radicular back pain.  Does take Zoloft and hydroxyzine for anxiety and depression issues.  No significant leg swelling.  No history of DVT or PE personally or in the family.    Allergies:  Allergies   Allergen Reactions     Bactrim [Sulfamethoxazole W/Trimethoprim]      When very young, vomiting likely per mom     Cefzil [Cefprozil]      When very young, vomiting likely per mom     Penicillins      When very young, vomiting likely per mom     Sulfa Drugs      When very young, vomiting likely per mom       Problem List:    Patient Active Problem List    Diagnosis Date Noted     MDD (major depressive disorder), recurrent episode (H) 08/26/2020      Priority: Medium     Consumes a vegan diet - beans as a source of protein, multivit with iron 07/15/2020     Priority: Medium     Dizziness 07/15/2020     Priority: Medium     Malaise and fatigue 07/15/2020     Priority: Medium     Stomach upset 07/15/2020     Priority: Medium     Chest discomfort - central pressure 07/15/2020     Priority: Medium     Right eye injury, initial encounter 08/09/2019     Priority: Medium     Acne vulgaris 01/25/2017     Priority: Medium     SUSU (generalized anxiety disorder) 01/25/2017     Priority: Medium        Past Medical History:    Past Medical History:   Diagnosis Date     Adjustment disorder      Anxiety      Depression      PTSD (post-traumatic stress disorder)        Past Surgical History:    Past Surgical History:   Procedure Laterality Date     WISDOM TOOTH EXTRACTION         Family History:    Family History   Problem Relation Age of Onset     Substance Abuse Maternal Uncle      Melanoma No family hx of        Social History:  Marital Status:  Single [1]  Social History     Tobacco Use     Smoking status: Never Smoker     Smokeless tobacco: Never Used   Vaping Use     Vaping Use: Never used   Substance Use Topics     Alcohol use: No     Drug use: Not Currently        Medications:    acetaminophen (TYLENOL) 325 MG tablet  clindamycin (CLINDAMAX) 1 % external gel  cyclobenzaprine (FLEXERIL) 10 MG tablet  hydrOXYzine (ATARAX) 25 MG tablet  methylPREDNISolone (MEDROL DOSEPAK) 4 MG tablet therapy pack  sertraline (ZOLOFT) 25 MG tablet  sertraline (ZOLOFT) 50 MG tablet  tretinoin (RETIN-A) 0.1 % external cream  triamcinolone (KENALOG) 0.1 % external ointment          Review of Systems all other systems are reviewed and are negative.    Physical Exam   BP: 129/86  Pulse: 110  Temp: 97.9  F (36.6  C)  Resp: 18  Weight: 63.5 kg (140 lb)  SpO2: 99 %      Physical Exam alert female is not toxic or ill.  HEENT shows no facial droop or asymmetry.  Pupils are equal reactive.  Extract  motions are intact.  Her speech is clear and concise.  Neck was supple without meningismus but she does have muscular tenderness of the trapezius muscles bilaterally with the right greater than left.  No midline bony tenderness.  Mild tenderness over the right SI joint.  On examination like she has no significant swelling compared right to left.  No joint effusion at the knee.  Full range of motion of the knee.  However with flexion she has increased discomfort over the medial joint line and into the lower leg.  Up with palpation is here with straight leg no localizing tenderness and no pain with stressing of the MCL or joint.  Lateral joint line is nontender and intact.  Good endpoints for ACL and PCL.  She has intact pulses and sensation in the lower extremity.  Calf is nontender.  Negative Homans.  No skin rash over the back or extremities.    ED Course                 Procedures              Critical Care time:  none               Results for orders placed or performed during the hospital encounter of 09/19/22 (from the past 24 hour(s))   CBC with platelets differential    Narrative    The following orders were created for panel order CBC with platelets differential.  Procedure                               Abnormality         Status                     ---------                               -----------         ------                     CBC with platelets and d...[[id number]]                      Final result                 Please view results for these tests on the individual orders.   D dimer quantitative   Result Value Ref Range    D-Dimer Quantitative 0.47 0.00 - 0.50 ug/mL FEU    Narrative    This D-dimer assay is intended for use in conjunction with a clinical pretest probability assessment model to exclude pulmonary embolism (PE) and deep venous thrombosis (DVT) in outpatients suspected of PE or DVT. The cut-off value is 0.50 ug/mL FEU.   INR   Result Value Ref Range    INR 1.12 0.85 - 1.15    Comprehensive metabolic panel   Result Value Ref Range    Sodium 139 133 - 144 mmol/L    Potassium 4.1 3.4 - 5.3 mmol/L    Chloride 109 94 - 109 mmol/L    Carbon Dioxide (CO2) 24 20 - 32 mmol/L    Anion Gap 6 3 - 14 mmol/L    Urea Nitrogen 17 7 - 30 mg/dL    Creatinine 0.56 0.52 - 1.04 mg/dL    Calcium 9.2 8.5 - 10.1 mg/dL    Glucose 86 70 - 99 mg/dL    Alkaline Phosphatase 64 40 - 150 U/L    AST 32 0 - 45 U/L    ALT 34 0 - 50 U/L    Protein Total 7.9 6.8 - 8.8 g/dL    Albumin 4.0 3.4 - 5.0 g/dL    Bilirubin Total 0.3 0.2 - 1.3 mg/dL    GFR Estimate >90 >60 mL/min/1.73m2   CBC with platelets and differential   Result Value Ref Range    WBC Count 5.6 4.0 - 11.0 10e3/uL    RBC Count 5.01 3.80 - 5.20 10e6/uL    Hemoglobin 15.5 11.7 - 15.7 g/dL    Hematocrit 45.6 35.0 - 47.0 %    MCV 91 78 - 100 fL    MCH 30.9 26.5 - 33.0 pg    MCHC 34.0 31.5 - 36.5 g/dL    RDW 12.0 10.0 - 15.0 %    Platelet Count 238 150 - 450 10e3/uL    % Neutrophils 63 %    % Lymphocytes 27 %    % Monocytes 8 %    % Eosinophils 1 %    % Basophils 1 %    % Immature Granulocytes 0 %    NRBCs per 100 WBC 0 <1 /100    Absolute Neutrophils 3.5 1.6 - 8.3 10e3/uL    Absolute Lymphocytes 1.5 0.8 - 5.3 10e3/uL    Absolute Monocytes 0.5 0.0 - 1.3 10e3/uL    Absolute Eosinophils 0.1 0.0 - 0.7 10e3/uL    Absolute Basophils 0.1 0.0 - 0.2 10e3/uL    Absolute Immature Granulocytes 0.0 <=0.4 10e3/uL    Absolute NRBCs 0.0 10e3/uL   XR Knee Right 3 Views    Narrative    XR KNEE RIGHT 3 VIEWS   9/19/2022 2:31 PM     HISTORY:  Right medial knee pain    Comparison: None.      Impression    IMPRESSION: Normal.    OSIRIS ALBRIGHT MD         SYSTEM ID:  [id number]       Medications   0.9% sodium chloride BOLUS (0 mLs Intravenous Stopped 9/19/22 1447)     Followed by   sodium chloride 0.9% infusion (has no administration in time range)   diazepam (VALIUM) injection 5 mg (5 mg Intravenous Given 9/19/22 1317)     Patient had improvement with her neck and upper back pain.   Also some improvement in lower back discomfort.  Assessments & Plan (with Medical Decision Making)   Cristel Grissom is a 22 year old female who presents with worsening right leg pain.  Patient does not recall specific injury but symptoms began after she was bending down at [employer] where she was working.  When she stood up she experienced some discomfort in her medial thigh and knee area.  She did not have any problems with loss of bowel or bladder.  No loss of sensation or weakness in the leg.  She is able ambulate.  Diagnosed with a muscle strain of the right thigh.  Presents today with persistent and worsening leg pain rating all the way down to her foot and all the way up her back to her neck.  No headache or visual change.  No change in hearing.  No problems with speech or swallowing.  Denies chest pain, shortness of the cough.  No abdominal pain, nausea or vomiting.  No diarrhea or dysuria.  No saddle paresthesia or loss of bowel or bladder.  Able ambulate.  Certain positions of the knee makes symptoms worse.  Treatment for symptoms prior to arrival today.  Patient admits she does have some anxiety issues but does not think this is related.  Just over a year ago she was seen in the ER for some paresthesias and had a normal CT and subsequent MRI of the brain.  No history of radicular back pain.  Does take Zoloft and hydroxyzine for anxiety and depression issues.  No significant leg swelling.  No history of DVT or PE personally or in the family.  On exam patient did have some muscular spasm especially in the upper neck.  Tenderness over the right SI joint.  No hip, ankle or foot problems but did have tenderness in the medial knee with the knee flexed.  Over with stressing of the MCL and LCL with straight leg did not have any laxity or pain.  Good endpoints or ACL PCL.  No joint effusion.  X-ray was normal.  Question if she has a radiculopathy.  No MRI availability today.  Try conservative treatment including  steroids, muscle relaxants, ice or heat, and follow-up with primary doctor to consider physical therapy or MRI if persists.  Reasons to return to the ER were discussed  I have reviewed the nursing notes.    I have reviewed the findings, diagnosis, plan and need for follow up with the patient.       New Prescriptions    CYCLOBENZAPRINE (FLEXERIL) 10 MG TABLET    Take 1 tablet (10 mg) by mouth 3 times daily as needed    METHYLPREDNISOLONE (MEDROL DOSEPAK) 4 MG TABLET THERAPY PACK    Follow Package Directions       Final diagnoses:   Acute pain of right knee   Lumbar radiculopathy       9/19/2022   [facility] EMERGENCY DEPT     Vicente Green MD  09/19/22 0229

## 2022-09-19 NOTE — LETTER
September 19, 2022      To Whom It May Concern:      Cristel Grissom was seen in our Emergency Department today, 09/19/22.  I expect her condition to improve over the next 4-5 days.  She may return to work when improved.    Sincerely,        Vicente Green MD

## 2022-09-19 NOTE — ED TRIAGE NOTES
C/o back pain thinks twisted or pulled something in back but is having shooting pain R leg and up to neck. Pt states feels pain and numbness radiate to R arm sometimes as well.      Triage Assessment     Row Name 09/19/22 1116       Triage Assessment (Adult)    Airway WDL WDL       Respiratory WDL    Respiratory WDL WDL       Cardiac WDL    Cardiac WDL WDL

## 2022-09-19 NOTE — TELEPHONE ENCOUNTER
"FYI      Nurse Triage SBAR    Is this a 2nd Level Triage? YES, LICENSED PRACTITIONER REVIEW IS REQUIRED    Situation: Patient reports numbness, to R side of inner thigh, down her leg and foot. She states her spine and all the way up to her head feels painful and tingly. She is experiencing some weakness but is still able to walk, she also said she has a headache and blurred vision at times. No loss of bladder control. Says it is worsening since injury on 9/15    Background: Patient seen on 9/15 for muscle strain in ED.     Assessment: Due to numbness on R side and worsening pain and tingling in spine and head,     Protocol Recommended Disposition:   Call  Now    Recommendation: Patient going to ED     Patient to go to ED    Does the patient meet one of the following criteria for ADS visit consideration? No  Reason for Disposition    New neurologic deficit that is present NOW, sudden onset of ANY of the following: * Weakness of the face, arm, or leg on one side of the body* Numbness of the face, arm, or leg on one side of the body* Loss of speech or garbled speech    Additional Information    Negative: Difficult to awaken or acting confused (e.g., disoriented, slurred speech)    Answer Assessment - Initial Assessment Questions  1. SYMPTOM: \"What is the main symptom you are concerned about?\" (e.g., weakness, numbness)      Numbness to R inside of leg and foot, going up her spine and into her neck and head  2. ONSET: \"When did this start?\" (minutes, hours, days; while sleeping)      Had a recent work injury on 9/15. Numbness started today  3. LAST NORMAL: \"When was the last time you (the patient) were normal (no symptoms)?\"      A few days ago  4. PATTERN \"Does this come and go, or has it been constant since it started?\"  \"Is it present now?\"      Constant  5. CARDIAC SYMPTOMS: \"Have you had any of the following symptoms: chest pain, difficulty breathing, palpitations?\"      Reports some difficulty breathing  6. " "NEUROLOGIC SYMPTOMS: \"Have you had any of the following symptoms: headache, dizziness, vision loss, double vision, changes in speech, unsteady on your feet?\"      Headache, blurry vision  7. OTHER SYMPTOMS: \"Do you have any other symptoms?\"      R sided numbness  8. PREGNANCY: \"Is there any chance you are pregnant?\" \"When was your last menstrual period?\"      Not asked    Protocols used: NEUROLOGIC DEFICIT-A-OH      LAUREN Corona, RN      "

## 2022-09-19 NOTE — TELEPHONE ENCOUNTER
Called patient and gave her recommendation. Patient states understanding.     Lucinda Begum, IVONNEN, RN  [facility]  September 19, 2022

## 2022-09-19 NOTE — TELEPHONE ENCOUNTER
Agree with disposition unless patient declines then would advise office visit in 24 hours.     Erasmo Ricketts PA-C

## 2022-09-23 ENCOUNTER — VIRTUAL VISIT (OUTPATIENT)
Dept: FAMILY MEDICINE | Facility: OTHER | Age: 22
End: 2022-09-23
Payer: OTHER MISCELLANEOUS

## 2022-09-23 ENCOUNTER — E-VISIT (OUTPATIENT)
Dept: FAMILY MEDICINE | Facility: OTHER | Age: 22
End: 2022-09-23
Payer: COMMERCIAL

## 2022-09-23 ENCOUNTER — TELEPHONE (OUTPATIENT)
Dept: FAMILY MEDICINE | Facility: OTHER | Age: 22
End: 2022-09-23

## 2022-09-23 DIAGNOSIS — Z53.9 ERRONEOUS ENCOUNTER--DISREGARD: Primary | ICD-10-CM

## 2022-09-23 DIAGNOSIS — F41.1 GAD (GENERALIZED ANXIETY DISORDER): Primary | ICD-10-CM

## 2022-09-23 DIAGNOSIS — S89.91XD INJURY OF RIGHT KNEE, SUBSEQUENT ENCOUNTER: Primary | ICD-10-CM

## 2022-09-23 DIAGNOSIS — Z02.6 ENCOUNTER RELATED TO WORKER'S COMPENSATION CLAIM: ICD-10-CM

## 2022-09-23 PROCEDURE — 99213 OFFICE O/P EST LOW 20 MIN: CPT | Mod: GT | Performed by: PHYSICIAN ASSISTANT

## 2022-09-23 RX ORDER — NAPROXEN 500 MG/1
500 TABLET ORAL 2 TIMES DAILY WITH MEALS
Qty: 60 TABLET | Refills: 0 | Status: SHIPPED | OUTPATIENT
Start: 2022-09-23 | End: 2022-10-07

## 2022-09-23 RX ORDER — PROPRANOLOL HCL 60 MG
60 CAPSULE, EXTENDED RELEASE 24HR ORAL DAILY
Qty: 30 CAPSULE | Refills: 0 | Status: SHIPPED | OUTPATIENT
Start: 2022-09-23 | End: 2022-10-07 | Stop reason: DRUGHIGH

## 2022-09-23 NOTE — LETTER
REPORT OF WORK COMP    [address]  [facility] [address]  [phone]      PATIENT DATA    Employee Name: Cristel Grissom      : 2000     SS#: [id number]    Work related injury: Yes  Employer at time of injury: [employer]    Today's date: 2022  Date of injury: 21  Date of first visit: 09/15/22    PROVIDER EVALUATION: Please fill in as needed.  Please give copy to employee for employer.    1. Diagnosis: Right knee injury and right thigh injury   2. Treatment: NSAIDs, rest, tylenol, cyclobenzaprine.  3. Medication: See above.   NOTE: When ordering a medication, [place] Rules require [place] Comp or WC on prescriptions.  4. No work from 09/15/22 to 10/2/2022.  5. Return to work date: TBD        RESTRICTIONS: Unlimited unless listed.  Restrictions apply to home and leisure also.  If work restrictions is not available, the employee is totally disabled.    Medical Examiner: Erasmo Ricketts PA-C     Next appointment: 1 weeks    CC: Employer, Managed Care Plan/Payor, Patient

## 2022-09-23 NOTE — TELEPHONE ENCOUNTER
We discussed her anxiety, has been having racing heart, feeling some chest tightness.  Admits her anxiety is very high.  She has an appointment coming up with psychiatry.  She has been on propranolol in the past, she will monitor for side effects.       Erasmo Ricketts PA-C

## 2022-09-23 NOTE — PROGRESS NOTES
Cristel is a 22 year old who is being evaluated via a billable video visit.      How would you like to obtain your AVS? MyChart  If the video visit is dropped, the invitation should be resent by: Text to cell phone: [phone]  Will anyone else be joining your video visit? No      Assessment & Plan     Injury of right knee, subsequent encounter  Encounter related to worker's compensation claim  Per patient history it sounds like she may have actually injured the medial aspect of her knee and had subsequent injury to the medial thigh on the right side from being crouched low and going to a standing position.  She hasn't had any improvement yet but ED found no signs of more emergent concerns at this point.  Recommended discontinuing steroid and starting on Naproxen instead based on the fact that the area of her initial pain is not likely to respond to steroids. It sounds like her back pain is more associated to changes in activity and probably increased sitting since the injury.  Did complete WC paperwork, recommend she be out for right now and reassess in a week.  Will also get her in with physical therapy. Advised to take the Naproxen with food in her stomach to avoid GI upset at this point.    - naproxen (NAPROSYN) 500 MG tablet; Take 1 tablet (500 mg) by mouth 2 times daily (with meals)  - Physical Therapy Referral; Future      Return in about 1 week (around 9/30/2022) for Medication Re-check.      Options for treatment and follow-up care were reviewed with the patient and/or guardian. Patient and/or guardian engaged in the decision making process and verbalized understanding of the options discussed and agreed with the final plan.  Erasmo Ricketts PA-C  [facility]   Cristel is a 22 year old, presenting for the following health issues:  No chief complaint on file.      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  She was crouching down and then went to get  up and noticed a pain pretty immediately right around the knee and then it started to move upwards and then also down throughout the rest of the leg.    - Now she is starting to feel the symptoms through the lower back.    - She has gone to the ED twice.   - The first visit she was told to treat with Tylenol, she tried it but it didn't help anything.  It just didn't help.   - Then she went to Urgent Care for a doctors note for work.  Even then they had noted that it was likely a muscle strain.   - She tried to go to work on 09/18/22.  She was on restrictions for sitting and frequent position changes. She did go to work with these restrictions and she was still having pain because she was on Tylenol at that time.   - It hasn't gotten better and so she went to the ED a second time.    - At that visit they did an x-ray and there were no concerns.  They had no concerns about blood clots.  She was also having pain all the way up to the upper back and neck at that time as well.    - They gave her a muscle relaxer and a steroid as well but that hasn't helped at all.   - She has pain relief if she doesn't move, once she starts to move it starts to hurt.      - She points to the inside of her right knee and up into the medial thigh and down to her medial ankle, she was getting some cramping at the bottom of her foot.   She is now starting to notice it in her right side lower back.   - She has been stretching the neck that is helping some.   - Anxiety has been really high the last few months.     - No fevers, chills, bruising, redness, swelling of the leg.  No paresthesias.   - Work is aware of this.  She is in the process for Work Comp.       Review of Systems   Constitutional, cardiovascular, musculoskeletal, neuro, skin, and psych systems are negative, except as otherwise noted.      Objective           Vitals:  No vitals were obtained today due to virtual visit.    Physical Exam   GENERAL: Healthy, alert and no  distress  EYES: Eyes grossly normal to inspection.  No discharge or erythema, or obvious scleral/conjunctival abnormalities.  RESP: No audible wheeze, cough, or visible cyanosis.  No visible retractions or increased work of breathing.    SKIN: Visible skin clear. No significant rash, abnormal pigmentation or lesions.  NEURO: Cranial nerves grossly intact.  Mentation and speech appropriate for age.  PSYCH: Mentation appears normal, affect normal/bright, judgement and insight intact, normal speech and appearance well-groomed.            Video-Visit Details    Video Start Time: 3:00 PM    Type of service:  Video Visit    Video End Time:3:24 PM    Originating Location (pt. Location): Home    Distant Location (provider location):  [facility]     Platform used for Video Visit: [employer]

## 2022-09-30 ENCOUNTER — VIRTUAL VISIT (OUTPATIENT)
Dept: FAMILY MEDICINE | Facility: OTHER | Age: 22
End: 2022-09-30
Payer: OTHER MISCELLANEOUS

## 2022-09-30 ENCOUNTER — THERAPY VISIT (OUTPATIENT)
Dept: PHYSICAL THERAPY | Facility: CLINIC | Age: 22
End: 2022-09-30
Attending: PHYSICIAN ASSISTANT
Payer: OTHER MISCELLANEOUS

## 2022-09-30 DIAGNOSIS — Z02.6 ENCOUNTER RELATED TO WORKER'S COMPENSATION CLAIM: Primary | ICD-10-CM

## 2022-09-30 DIAGNOSIS — M25.561 ACUTE PAIN OF RIGHT KNEE: ICD-10-CM

## 2022-09-30 DIAGNOSIS — S89.91XD INJURY OF RIGHT KNEE, SUBSEQUENT ENCOUNTER: ICD-10-CM

## 2022-09-30 DIAGNOSIS — Z02.6 ENCOUNTER RELATED TO WORKER'S COMPENSATION CLAIM: ICD-10-CM

## 2022-09-30 PROCEDURE — 97161 PT EVAL LOW COMPLEX 20 MIN: CPT | Mod: GP | Performed by: PHYSICAL THERAPIST

## 2022-09-30 PROCEDURE — 97140 MANUAL THERAPY 1/> REGIONS: CPT | Mod: GP | Performed by: PHYSICAL THERAPIST

## 2022-09-30 PROCEDURE — 99213 OFFICE O/P EST LOW 20 MIN: CPT | Mod: GT | Performed by: PHYSICIAN ASSISTANT

## 2022-09-30 PROCEDURE — 97110 THERAPEUTIC EXERCISES: CPT | Mod: GP | Performed by: PHYSICAL THERAPIST

## 2022-09-30 ASSESSMENT — ACTIVITIES OF DAILY LIVING (ADL)
RISE FROM A CHAIR: ACTIVITY IS SOMEWHAT DIFFICULT
KNEEL ON THE FRONT OF YOUR KNEE: I AM UNABLE TO DO THE ACTIVITY
WEAKNESS: THE SYMPTOM AFFECTS MY ACTIVITY MODERATELY
SWELLING: THE SYMPTOM PREVENTS ME FROM ALL DAILY ACTIVITIES
HOW_WOULD_YOU_RATE_THE_CURRENT_FUNCTION_OF_YOUR_KNEE_DURING_YOUR_USUAL_DAILY_ACTIVITIES_ON_A_SCALE_FROM_0_TO_100_WITH_100_BEING_YOUR_LEVEL_OF_KNEE_FUNCTION_PRIOR_TO_YOUR_INJURY_AND_0_BEING_THE_INABILITY_TO_PERFORM_ANY_OF_YOUR_USUAL_DAILY_ACTIVITIES?: 60
GIVING WAY, BUCKLING OR SHIFTING OF KNEE: THE SYMPTOM PREVENTS ME FROM ALL DAILY ACTIVITIES
SIT WITH YOUR KNEE BENT: ACTIVITY IS SOMEWHAT DIFFICULT
STIFFNESS: THE SYMPTOM AFFECTS MY ACTIVITY MODERATELY
GO DOWN STAIRS: ACTIVITY IS FAIRLY DIFFICULT
PAIN: THE SYMPTOM AFFECTS MY ACTIVITY MODERATELY
KNEE_ACTIVITY_OF_DAILY_LIVING_SCORE: 34.29
SQUAT: I AM UNABLE TO DO THE ACTIVITY
HOW_WOULD_YOU_RATE_THE_OVERALL_FUNCTION_OF_YOUR_KNEE_DURING_YOUR_USUAL_DAILY_ACTIVITIES?: ABNORMAL
AS_A_RESULT_OF_YOUR_KNEE_INJURY,_HOW_WOULD_YOU_RATE_YOUR_CURRENT_LEVEL_OF_DAILY_ACTIVITY?: ABNORMAL
RAW_SCORE: 24
GO UP STAIRS: ACTIVITY IS FAIRLY DIFFICULT
WALK: ACTIVITY IS SOMEWHAT DIFFICULT
KNEE_ACTIVITY_OF_DAILY_LIVING_SUM: 24
LIMPING: THE SYMPTOM AFFECTS MY ACTIVITY MODERATELY
STAND: ACTIVITY IS SOMEWHAT DIFFICULT

## 2022-09-30 ASSESSMENT — PAIN SCALES - GENERAL: PAINLEVEL: MILD PAIN (3)

## 2022-09-30 NOTE — PROGRESS NOTES
Physical Therapy Initial Evaluation  Subjective:  The history is provided by the patient. No  was used.   Therapist Generated HPI Evaluation  Problem details: Injured at work, crouching down and stood up. There was an intense pain down the inside of her leg, primarily the medial patella..         Type of problem:  Right knee.    This is a new condition.  Occurance: deep squat.  Where condition occurred: at work.  Patient reports pain:  Medial.  Pain is described as aching and is intermittent.  Pain timing: Unknown.  Since onset symptoms are gradually improving.  Associated symptoms:  Loss of motion/stiffness. Symptoms are exacerbated by bending/squatting, kneeling, descending stairs, ascending stairs and activity  and relieved by ice, heat and rest.  Special tests included:  X-ray.    Restrictions due to condition include:  Working in normal job with restrictions.  Barriers include:  None as reported by patient.    Patient Health History  Cristel Grissom being seen for Knee Pain .     Problem began: 2022.   Problem occurred: Crouching down and then standing back up   Pain is reported as 3/10 on pain scale.  General health as reported by patient is good.  Pertinent medical history includes: concussions/dizziness.   Red flags:  None as reported by patient.   Other medical allergies details: Penicilin, sulfa drugs, amoxcilin, bactrum.   Surgeries include:  Orthopedic surgery. Other surgery history details: [place] teeth.    Current medications:  Anti-depressants.    Current occupation is [employer].   Primary job tasks include:  Lifting/carrying, prolonged standing and pushing/pulling.   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                                  Objective:  System    Physical Exam    General     ROS  Cristel Grissom , : 2000, MRN: [id number]    Physical Therapy Objective Findings  Subjective information, goals, clinical impression, daily documentation and other  information found in EPISODES tab.  Knee Objective Findings    Posture:    Gait:  normal    Foot Position in Standing:  Mild pes planus, R>L    Lumbar Screen: Normal    Hip Screen: Normal    Range of Motion:                                    Right AROM            Right PROM Left AROM              Left PROM                Extension 5, ext. Lag  WNL    Flexion WNL  WNL    Other:         Manual Muscle Testing  (graded 0-5, measured at 0 degrees unless otherwise noted):                                                                       Right                                                  Left                                                      Flexion          5 5   Extension 5 5   Hip Abduction 3+ 4   Quad Set     VMO     Other:     (+ mild pain, ++ moderate pain, +++ severe pain)    Edema:                                                                        Right                                                  Left                                                       Mild edema along medial joint line, popliteal fossa        Special Tests:                                                                    Right                                                   Left                                                      Step Test Height           -Control Comment     Functional Squat (deg.)     Lachmans     Posterior Sag     Anterior Drawer     Posterior Drawer     Varus at 0 & 30     Valgus at 0 & 30     Cookie's Positive, medially     Apley's Distraction     Apley's Compression     External Rotation Test     OTHER:  Hyperextension  Hyperflexion   Positive for mild pain  Positive for mild pain        Flexibility:                                                                    Right                                                   Left                                                      Gastroc Normal Normal   Soleus     Hamstrings Normal Normal   Hip Flexors     Quadricep Normal Normal   ITB             Joint Mobility                                                                       Right                                                   Left                                                       Patellar Static Position NA NA   Patellar Passive Mobility NA NA   Patellar Dynamic Mobility NA NA   Proximal Tib-fib     Tibiofemoral            Palpation: Mild swelling on posterior R. Knee, mild pain along the medial joint line of the right knee, mild swelling along the medial joint line of R. knee    Assessment/Plan:    Patient is a 22 year old female with right side knee complaints.    Patient has the following significant findings with corresponding treatment plan.                Diagnosis 1:  Knee Pain, consistent w/ mild meniscus strain    Pain -  manual therapy, splint/taping/bracing/orthotics, self management, education, directional preference exercise and home program  Decreased joint mobility - manual therapy, therapeutic exercise, therapeutic activity and home program  Decreased strength - therapeutic exercise, therapeutic activities and home program  Impaired muscle performance - neuro re-education and home program    Therapy Evaluation Codes:   1) History comprised of:   Personal factors that impact the plan of care:      None.    Comorbidity factors that impact the plan of care are:      None.     Medications impacting care: None.  2) Examination of Body Systems comprised of:   Body structures and functions that impact the plan of care:      Knee.   Activity limitations that impact the plan of care are:      Lifting, Squatting/kneeling and Stairs.  3) Clinical presentation characteristics are:   Stable/Uncomplicated.  4) Decision-Making    Low complexity using standardized patient assessment instrument and/or measureable assessment of functional outcome.  Cumulative Therapy Evaluation is: Low complexity.    Previous and current functional limitations:  (See Goal Flow Sheet for this information)     Short term and Long term goals: (See Goal Flow Sheet for this information)     Communication ability:  Patient appears to be able to clearly communicate and understand verbal and written communication and follow directions correctly.  Treatment Explanation - The following has been discussed with the patient:   RX ordered/plan of care  Anticipated outcomes  Possible risks and side effects  This patient would benefit from PT intervention to resume normal activities.   Rehab potential is good.    Frequency:  1 X week, once daily  Duration:  for 8 weeks  Discharge Plan:  Achieve all LTG.  Independent in home treatment program.  Reach maximal therapeutic benefit.    Please refer to the daily flowsheet for treatment today, total treatment time and time spent performing 1:1 timed codes.     Ghanshyam Medley, SPT Elvis Arthur, PT, OCS

## 2022-09-30 NOTE — LETTER
REPORT OF WORK COMP    [address]  [facility] [address]  [phone]      PATIENT DATA    Employee Name: Cristel Grissom      : 2000     #: [id number]    Work related injury: Yes    Today's date: 2022  Date of injury: 21  Date of first visit: 09/15/22    PROVIDER EVALUATION: Please fill in as needed.  Please give copy to employee for employer.    1. Diagnosis: Right knee injury.   2. Treatment: Rest, physical therapy.  3. Medication: Diclofenac Gel.   NOTE: When ordering a medication, [place] Rules require [place] or WC on prescriptions.  5. Return to work date: 10/3/2022   ** WITH RESTRICTIONS? Yes, with work restrictions: * Other: 4 hour shift maximum.  Standing/walking max 15 minutes at a time, ok to do any work seated at this time.  No kneeling, crouching, stairs, ladder climbing.   DURATION OF LIMITATIONS: 2 weeks.     RESTRICTIONS: Unlimited unless listed.  Restrictions apply to home and leisure also.  If work restrictions is not available, the employee is totally disabled.    Any Permanent Partial Disability? Deferred to future exam/consult.      Medical Examiner: Erasmo Ricketts PA-C             Next appointment: 2 weeks    CC: Employer, Managed Care Plan/Payor, Patient

## 2022-09-30 NOTE — PROGRESS NOTES
Cristel is a 22 year old who is being evaluated via a billable video visit.      How would you like to obtain your AVS? [facility]  If the video visit is dropped, the invitation should be resent by: Text to cell phone: [phone]  Will anyone else be joining your video visit? No    Assessment & Plan     Encounter related to worker's compensation claim  Injury of right knee, subsequent encounter  We will try topical diclofenac since she doesn't tolerate the NSAIDs by mouth.   She reported physical therapy noted some swelling, suspect she has a meniscal injury.  Encouraged wrapping if needed for support.   She is doing better so will allow for return to work starting next week with restrictions, she will update via [employer] and we can determine if she needs another visit or if we can continue to advance her activities at work through the next couple of weeks.   - diclofenac (VOLTAREN) 1 % topical gel; Apply 2 g topically 4 times daily      Return in about 2 weeks (around 10/14/2022) for Recheck.    Options for treatment and follow-up care were reviewed with the patient and/or guardian. Patient and/or guardian engaged in the decision making process and verbalized understanding of the options discussed and agreed with the final plan.    Erasmo Ricketts PA-C  [facility]   Cristel is a 22 year old, presenting for the following health issues:  Musculoskeletal Problem      History of Present Illness       Reason for visit:  Leg and lower back pain.    She eats 2-3 servings of fruits and vegetables daily.She consumes 2 sweetened beverage(s) daily.She exercises with enough effort to increase her heart rate 9 or less minutes per day.  She exercises with enough effort to increase her heart rate 3 or less days per week.   She is taking medications regularly.     Back pain has resolved  Pain History:  When did you first notice your pain? - Acute Pain   Have you seen anyone else for your pain? Yes  - physical therapy   Where in your body do you have pain? Musculoskeletal problem/pain  Onset/Duration: 2-3 weeks   Description  Location: knee - right  Joint Swelling: YES- mild  Redness: No  Pain: YES  Warmth: No  Intensity:  mild  Progression of Symptoms:  improving  Accompanying signs and symptoms:   Fevers: No  Numbness/tingling/weakness: mild weakness  History  Trauma to the area: YES  Recent illness:  No  Previous similar problem: No  Previous evaluation:  No  Precipitating or alleviating factors:  Aggravating factors include: bending  Therapies tried and outcome: none currently    - Let is feeling better to the point that she can move on it.   - Right now it just feels sore if on it and moving.   - She did go to physical therapy today and they gave her some exercises .   - She has noticed some swelling in the area and they think meniscus is irritated.  They gave her some to strengthen the knee as well.   - In a day she would probably total about a couple of hours on her feet.  It would feel sore.        Review of Systems   Constitutional, musculoskeletal, neuro, skin systems are negative, except as otherwise noted.      Objective    Vitals - Patient Reported  Pain Score: Mild Pain (3)  Pain Loc: Knee      Vitals:  No vitals were obtained today due to virtual visit.    Physical Exam   GENERAL: Healthy, alert and no distress  EYES: Eyes grossly normal to inspection.  No discharge or erythema, or obvious scleral/conjunctival abnormalities.  RESP: No audible wheeze, cough, or visible cyanosis.  No visible retractions or increased work of breathing.    SKIN: Visible skin clear. No significant rash, abnormal pigmentation or lesions.  NEURO: Cranial nerves grossly intact.  Mentation and speech appropriate for age.  PSYCH: Mentation appears normal, affect normal/bright, judgement and insight intact, normal speech and appearance well-groomed.            Video-Visit Details    Video Start Time: 2:00 PM    Type of  service:  Video Visit     Video End Time:2:09 PM    Originating Location (pt. Location): Home    Distant Location (provider location):  [facility]     Platform used for Video Visit: [employer]

## 2022-10-07 ENCOUNTER — THERAPY VISIT (OUTPATIENT)
Dept: PHYSICAL THERAPY | Facility: CLINIC | Age: 22
End: 2022-10-07
Payer: OTHER MISCELLANEOUS

## 2022-10-07 ENCOUNTER — OFFICE VISIT (OUTPATIENT)
Dept: FAMILY MEDICINE | Facility: OTHER | Age: 22
End: 2022-10-07
Payer: COMMERCIAL

## 2022-10-07 VITALS
OXYGEN SATURATION: 98 % | TEMPERATURE: 98.3 F | WEIGHT: 138 LBS | SYSTOLIC BLOOD PRESSURE: 116 MMHG | HEART RATE: 105 BPM | BODY MASS INDEX: 23.56 KG/M2 | HEIGHT: 64 IN | DIASTOLIC BLOOD PRESSURE: 70 MMHG

## 2022-10-07 DIAGNOSIS — F41.1 GAD (GENERALIZED ANXIETY DISORDER): ICD-10-CM

## 2022-10-07 DIAGNOSIS — Z13.1 SCREENING FOR DIABETES MELLITUS: ICD-10-CM

## 2022-10-07 DIAGNOSIS — M25.561 RIGHT KNEE PAIN: Primary | ICD-10-CM

## 2022-10-07 DIAGNOSIS — Z23 ENCOUNTER FOR VACCINATION: ICD-10-CM

## 2022-10-07 DIAGNOSIS — Z11.3 SCREENING FOR STDS (SEXUALLY TRANSMITTED DISEASES): ICD-10-CM

## 2022-10-07 DIAGNOSIS — Z13.6 CARDIOVASCULAR SCREENING; LDL GOAL LESS THAN 130: ICD-10-CM

## 2022-10-07 DIAGNOSIS — Z00.00 ANNUAL VISIT FOR GENERAL ADULT MEDICAL EXAMINATION WITHOUT ABNORMAL FINDINGS: Primary | ICD-10-CM

## 2022-10-07 DIAGNOSIS — Z11.59 NEED FOR HEPATITIS C SCREENING TEST: ICD-10-CM

## 2022-10-07 PROBLEM — Z78.9 CONSUMES A VEGAN DIET: Status: RESOLVED | Noted: 2020-07-15 | Resolved: 2022-10-07

## 2022-10-07 LAB
ANION GAP SERPL CALCULATED.3IONS-SCNC: 5 MMOL/L (ref 3–14)
BUN SERPL-MCNC: 15 MG/DL (ref 7–30)
CALCIUM SERPL-MCNC: 9.2 MG/DL (ref 8.5–10.1)
CHLORIDE BLD-SCNC: 105 MMOL/L (ref 94–109)
CHOLEST SERPL-MCNC: 181 MG/DL
CO2 SERPL-SCNC: 28 MMOL/L (ref 20–32)
CREAT SERPL-MCNC: 0.7 MG/DL (ref 0.52–1.04)
FASTING STATUS PATIENT QL REPORTED: YES
GFR SERPL CREATININE-BSD FRML MDRD: >90 ML/MIN/1.73M2
GLUCOSE BLD-MCNC: 85 MG/DL (ref 70–99)
HCV AB SERPL QL IA: NONREACTIVE
HDLC SERPL-MCNC: 44 MG/DL
LDLC SERPL CALC-MCNC: 125 MG/DL
NONHDLC SERPL-MCNC: 137 MG/DL
POTASSIUM BLD-SCNC: 3.4 MMOL/L (ref 3.4–5.3)
SODIUM SERPL-SCNC: 138 MMOL/L (ref 133–144)
TRIGL SERPL-MCNC: 60 MG/DL

## 2022-10-07 PROCEDURE — 97110 THERAPEUTIC EXERCISES: CPT | Mod: GP | Performed by: PHYSICAL THERAPY ASSISTANT

## 2022-10-07 PROCEDURE — 80048 BASIC METABOLIC PNL TOTAL CA: CPT | Performed by: PHYSICIAN ASSISTANT

## 2022-10-07 PROCEDURE — 90471 IMMUNIZATION ADMIN: CPT | Performed by: PHYSICIAN ASSISTANT

## 2022-10-07 PROCEDURE — 87491 CHLMYD TRACH DNA AMP PROBE: CPT | Performed by: PHYSICIAN ASSISTANT

## 2022-10-07 PROCEDURE — 90651 9VHPV VACCINE 2/3 DOSE IM: CPT | Performed by: PHYSICIAN ASSISTANT

## 2022-10-07 PROCEDURE — 90472 IMMUNIZATION ADMIN EACH ADD: CPT | Performed by: PHYSICIAN ASSISTANT

## 2022-10-07 PROCEDURE — 90715 TDAP VACCINE 7 YRS/> IM: CPT | Performed by: PHYSICIAN ASSISTANT

## 2022-10-07 PROCEDURE — 86803 HEPATITIS C AB TEST: CPT | Performed by: PHYSICIAN ASSISTANT

## 2022-10-07 PROCEDURE — 99395 PREV VISIT EST AGE 18-39: CPT | Mod: 25 | Performed by: PHYSICIAN ASSISTANT

## 2022-10-07 PROCEDURE — 80061 LIPID PANEL: CPT | Performed by: PHYSICIAN ASSISTANT

## 2022-10-07 PROCEDURE — 99213 OFFICE O/P EST LOW 20 MIN: CPT | Mod: 25 | Performed by: PHYSICIAN ASSISTANT

## 2022-10-07 PROCEDURE — 36415 COLL VENOUS BLD VENIPUNCTURE: CPT | Performed by: PHYSICIAN ASSISTANT

## 2022-10-07 PROCEDURE — 87591 N.GONORRHOEAE DNA AMP PROB: CPT | Performed by: PHYSICIAN ASSISTANT

## 2022-10-07 PROCEDURE — 90686 IIV4 VACC NO PRSV 0.5 ML IM: CPT | Performed by: PHYSICIAN ASSISTANT

## 2022-10-07 PROCEDURE — 97140 MANUAL THERAPY 1/> REGIONS: CPT | Mod: GP | Performed by: PHYSICAL THERAPY ASSISTANT

## 2022-10-07 RX ORDER — PROPRANOLOL HYDROCHLORIDE 10 MG/1
10 TABLET ORAL PRN
COMMUNITY

## 2022-10-07 ASSESSMENT — ENCOUNTER SYMPTOMS
JOINT SWELLING: 0
COUGH: 0
EYE PAIN: 0
CONSTIPATION: 0
FREQUENCY: 0
FEVER: 0
ABDOMINAL PAIN: 1
BREAST MASS: 0
HEMATURIA: 0
PARESTHESIAS: 0
DYSURIA: 0
SORE THROAT: 0
ARTHRALGIAS: 0
HEMATOCHEZIA: 0
HEARTBURN: 0
MYALGIAS: 0
NERVOUS/ANXIOUS: 0
DIZZINESS: 0
CHILLS: 0
NAUSEA: 1
WEAKNESS: 0
DIARRHEA: 1
SHORTNESS OF BREATH: 0
HEADACHES: 0
PALPITATIONS: 0

## 2022-10-07 ASSESSMENT — PAIN SCALES - GENERAL: PAINLEVEL: NO PAIN (0)

## 2022-10-07 NOTE — LETTER
My Depression Action Plan  Name: Cristel Grissom   Date of Birth 2000  Date: 10/7/2022    My doctor: Erasmo Ricketts   My clinic: [address]  [facility] [id number] [phone]          GREEN    ZONE   Good Control    What it looks like:     Things are going generally well. You have normal ups and downs. You may even feel depressed from time to time, but bad moods usually last less than a day.   What you need to do:  1. Continue to care for yourself (see self care plan)  2. Check your depression survival kit and update it as needed  3. Follow your physician s recommendations including any medication.  4. Do not stop taking medication unless you consult with your physician first.           YELLOW         ZONE Getting Worse    What it looks like:     Depression is starting to interfere with your life.     It may be hard to get out of bed; you may be starting to isolate yourself from others.    Symptoms of depression are starting to last most all day and this has happened for several days.     You may have suicidal thoughts but they are not constant.   What you need to do:     1. Call your care team. Your response to treatment will improve if you keep your care team informed of your progress. Yellow periods are signs an adjustment may need to be made.     2. Continue your self-care.  Just get dressed and ready for the day.  Don't give yourself time to talk yourself out of it.    3. Talk to someone in your support network.    4. Open up your Depression Self-Care Plan/Wellness Kit.           RED    ZONE Medical Alert - Get Help    What it looks like:     Depression is seriously interfering with your life.     You may experience these or other symptoms: You can t get out of bed most days, can t work or engage in other necessary activities, you have trouble taking care of basic hygiene, or basic responsibilities, thoughts of suicide or death that will  not go away, self-injurious behavior.     What you need to do:  1. Call your care team and request a same-day appointment. If they are not available (weekends or after hours) call your local crisis line, emergency room or 911.          Depression Self-Care Plan / Wellness Kit    Many people find that medication and therapy are helpful treatments for managing depression. In addition, making small changes to your everyday life can help to boost your mood and improve your wellbeing. Below are some tips for you to consider. Be sure to talk with your medical provider and/or behavioral health consultant if your symptoms are worsening or not improving.     Sleep   Sleep hygiene  means all of the habits that support good, restful sleep. It includes maintaining a consistent bedtime and wake time, using your bedroom only for sleeping or sex, and keeping the bedroom dark and free of distractions like a computer, smartphone, or television.     Develop a Healthy Routine  Maintain good hygiene. Get out of bed in the morning, make your bed, brush your teeth, take a shower, and get dressed. Don t spend too much time viewing media that makes you feel stressed. Find time to relax each day.    Exercise  Get some form of exercise every day. This will help reduce pain and release endorphins, the  feel good  chemicals in your brain. It can be as simple as just going for a walk or doing some gardening, anything that will get you moving.      Diet  Strive to eat healthy foods, including fruits and vegetables. Drink plenty of water. Avoid excessive sugar, caffeine, alcohol, and other mood-altering substances.     Stay Connected with Others  Stay in touch with friends and family members.    Manage Your Mood  Try deep breathing, massage therapy, biofeedback, or meditation. Take part in fun activities when you can. Try to find something to smile about each day.     Psychotherapy  Be open to working with a therapist if your provider recommends  it.     Medication  Be sure to take your medication as prescribed. Most anti-depressants need to be taken every day. It usually takes several weeks for medications to work. Not all medicines work for all people. It is important to follow-up with your provider to make sure you have a treatment plan that is working for you. Do not stop your medication abruptly without first discussing it with your provider.    Crisis Resources   These hotlines are for both adults and children. They and are open 24 hours a day, 7 days a week unless noted otherwise.      National Suicide Prevention Lifeline   988 or 8-391-185-IVMS (4533)      Crisis Text Line    www.crisistextline.org  Text HOME to 690066 from anywhere in the [place], anytime, about any type of crisis. A live, trained crisis counselor will receive the text and respond quickly.      [place] Lifeline for LGBTQ Youth  A national crisis intervention and suicide lifeline for LGBTQ youth under 25. Provides a safe place to talk without judgement. Call 1-735.267.1719; text START to 011585 or visit www.thetrevorproject.org to talk to a trained counselor.      For [facility] crisis numbers, visit the [facility] website at:  https://[place].gov/dhs/people-we-serve/adults/health-care/mental-health/resources/crisis-contacts.jsp
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
## 2022-10-08 LAB
C TRACH DNA SPEC QL NAA+PROBE: NEGATIVE
N GONORRHOEA DNA SPEC QL NAA+PROBE: NEGATIVE

## 2022-10-14 ENCOUNTER — THERAPY VISIT (OUTPATIENT)
Dept: PHYSICAL THERAPY | Facility: CLINIC | Age: 22
End: 2022-10-14
Payer: OTHER MISCELLANEOUS

## 2022-10-14 DIAGNOSIS — M25.561 ACUTE PAIN OF RIGHT KNEE: Primary | ICD-10-CM

## 2022-10-14 PROCEDURE — 97140 MANUAL THERAPY 1/> REGIONS: CPT | Mod: 59 | Performed by: PHYSICAL THERAPIST

## 2022-10-14 PROCEDURE — 97530 THERAPEUTIC ACTIVITIES: CPT | Mod: GP | Performed by: PHYSICAL THERAPIST

## 2022-10-14 PROCEDURE — 97110 THERAPEUTIC EXERCISES: CPT | Mod: 59 | Performed by: PHYSICAL THERAPIST

## 2022-10-18 ENCOUNTER — THERAPY VISIT (OUTPATIENT)
Dept: PHYSICAL THERAPY | Facility: CLINIC | Age: 22
End: 2022-10-18
Payer: OTHER MISCELLANEOUS

## 2022-10-18 DIAGNOSIS — M25.561 ACUTE PAIN OF RIGHT KNEE: Primary | ICD-10-CM

## 2022-10-18 PROCEDURE — 97530 THERAPEUTIC ACTIVITIES: CPT | Mod: GP | Performed by: PHYSICAL THERAPIST

## 2022-10-18 PROCEDURE — 97110 THERAPEUTIC EXERCISES: CPT | Mod: GP | Performed by: PHYSICAL THERAPIST

## 2022-11-01 ENCOUNTER — THERAPY VISIT (OUTPATIENT)
Dept: PHYSICAL THERAPY | Facility: CLINIC | Age: 22
End: 2022-11-01
Payer: OTHER MISCELLANEOUS

## 2022-11-01 DIAGNOSIS — M25.561 RIGHT KNEE PAIN: Primary | ICD-10-CM

## 2022-11-01 PROCEDURE — 97530 THERAPEUTIC ACTIVITIES: CPT | Mod: GP | Performed by: PHYSICAL THERAPIST

## 2022-11-01 PROCEDURE — 97110 THERAPEUTIC EXERCISES: CPT | Mod: GP | Performed by: PHYSICAL THERAPIST

## 2022-11-08 ENCOUNTER — THERAPY VISIT (OUTPATIENT)
Dept: PHYSICAL THERAPY | Facility: CLINIC | Age: 22
End: 2022-11-08
Payer: OTHER MISCELLANEOUS

## 2022-11-08 DIAGNOSIS — M25.561 ACUTE PAIN OF RIGHT KNEE: Primary | ICD-10-CM

## 2022-11-08 PROCEDURE — 97112 NEUROMUSCULAR REEDUCATION: CPT | Mod: GP | Performed by: PHYSICAL THERAPIST

## 2022-11-08 PROCEDURE — 97110 THERAPEUTIC EXERCISES: CPT | Mod: GP | Performed by: PHYSICAL THERAPIST

## 2022-11-08 PROCEDURE — 97530 THERAPEUTIC ACTIVITIES: CPT | Mod: GP | Performed by: PHYSICAL THERAPIST

## 2022-11-15 ENCOUNTER — THERAPY VISIT (OUTPATIENT)
Dept: PHYSICAL THERAPY | Facility: CLINIC | Age: 22
End: 2022-11-15
Payer: OTHER MISCELLANEOUS

## 2022-11-15 DIAGNOSIS — M25.561 ACUTE PAIN OF RIGHT KNEE: Primary | ICD-10-CM

## 2022-11-15 PROCEDURE — 97530 THERAPEUTIC ACTIVITIES: CPT | Mod: GP | Performed by: PHYSICAL THERAPIST

## 2022-11-15 PROCEDURE — 97112 NEUROMUSCULAR REEDUCATION: CPT | Mod: GP | Performed by: PHYSICAL THERAPIST

## 2022-11-15 PROCEDURE — 97110 THERAPEUTIC EXERCISES: CPT | Mod: GP | Performed by: PHYSICAL THERAPIST

## 2022-11-20 ENCOUNTER — MYC MEDICAL ADVICE (OUTPATIENT)
Dept: FAMILY MEDICINE | Facility: OTHER | Age: 22
End: 2022-11-20

## 2022-11-20 DIAGNOSIS — N92.1 METRORRHAGIA: ICD-10-CM

## 2022-11-21 NOTE — TELEPHONE ENCOUNTER
To PCP to advise on request for restarting her birth control to help with menstrual cramps/emotional regulation (per patient).  Had her well check with PCP 10/7/22.  Menstrual cycle began on Saturday.   Are you needing an EVISIT for this request?    Amber Melgar RN

## 2022-11-21 NOTE — TELEPHONE ENCOUNTER
Erasmo is currently out of the office, would it be okay for her to address this when she returns? I would think a virtual appt would be fine to her to discuss this at length. Virtual spots typically are easier to get on most schedules      Thank you,    Dallas Sun MD

## 2022-11-22 ENCOUNTER — THERAPY VISIT (OUTPATIENT)
Dept: PHYSICAL THERAPY | Facility: CLINIC | Age: 22
End: 2022-11-22
Payer: OTHER MISCELLANEOUS

## 2022-11-22 DIAGNOSIS — M25.561 ACUTE PAIN OF RIGHT KNEE: Primary | ICD-10-CM

## 2022-11-22 PROCEDURE — 97110 THERAPEUTIC EXERCISES: CPT | Mod: GP | Performed by: PHYSICAL THERAPIST

## 2022-11-22 PROCEDURE — 97112 NEUROMUSCULAR REEDUCATION: CPT | Mod: GP | Performed by: PHYSICAL THERAPIST

## 2022-11-22 PROCEDURE — 97530 THERAPEUTIC ACTIVITIES: CPT | Mod: GP | Performed by: PHYSICAL THERAPIST

## 2022-11-22 ASSESSMENT — ACTIVITIES OF DAILY LIVING (ADL)
WEAKNESS: I HAVE THE SYMPTOM BUT IT DOES NOT AFFECT MY ACTIVITY
LIMPING: I DO NOT HAVE THE SYMPTOM
GO DOWN STAIRS: ACTIVITY IS NOT DIFFICULT
HOW_WOULD_YOU_RATE_THE_CURRENT_FUNCTION_OF_YOUR_KNEE_DURING_YOUR_USUAL_DAILY_ACTIVITIES_ON_A_SCALE_FROM_0_TO_100_WITH_100_BEING_YOUR_LEVEL_OF_KNEE_FUNCTION_PRIOR_TO_YOUR_INJURY_AND_0_BEING_THE_INABILITY_TO_PERFORM_ANY_OF_YOUR_USUAL_DAILY_ACTIVITIES?: 95
GIVING WAY, BUCKLING OR SHIFTING OF KNEE: I DO NOT HAVE THE SYMPTOM
RAW_SCORE: 65
SIT WITH YOUR KNEE BENT: ACTIVITY IS NOT DIFFICULT
KNEE_ACTIVITY_OF_DAILY_LIVING_SCORE: 92.86
RISE FROM A CHAIR: ACTIVITY IS NOT DIFFICULT
KNEE_ACTIVITY_OF_DAILY_LIVING_SUM: 65
GO UP STAIRS: ACTIVITY IS NOT DIFFICULT
AS_A_RESULT_OF_YOUR_KNEE_INJURY,_HOW_WOULD_YOU_RATE_YOUR_CURRENT_LEVEL_OF_DAILY_ACTIVITY?: NEARLY NORMAL
WALK: ACTIVITY IS NOT DIFFICULT
KNEEL ON THE FRONT OF YOUR KNEE: ACTIVITY IS VERY DIFFICULT
STIFFNESS: I DO NOT HAVE THE SYMPTOM
SQUAT: ACTIVITY IS NOT DIFFICULT
HOW_WOULD_YOU_RATE_THE_OVERALL_FUNCTION_OF_YOUR_KNEE_DURING_YOUR_USUAL_DAILY_ACTIVITIES?: NEARLY NORMAL
STAND: ACTIVITY IS NOT DIFFICULT
PAIN: I DO NOT HAVE THE SYMPTOM
SWELLING: I DO NOT HAVE THE SYMPTOM

## 2022-11-22 NOTE — PROGRESS NOTES
"Subjective:  HPI  Physical Exam       Knee Activity of Daily Living Score: 92.86            Objective:  System    Physical Exam    General     ROS    Assessment/Plan:    DISCHARGE REPORT    Progress reporting period is from 09/20/2022 to 11/22/2022.       SUBJECTIVE  Subjective changes noted by patient:.  Subjective: Has not had any problems with her R knee but has not been kneeling or completing deep squats at work. Not having anysorness after 4 hours shifts. Reports putting off using ladders at work due to difficult with last trial at work. Has noticed a significant difference in strength in LE since start of PT.  Current pain level is 0/10 Current Pain level: 0/10.     Previous pain level was  3/10 Initial Pain level: 3/10.   Changes in function:  Yes (See Goal flowsheet attached for changes in current functional level)  Adverse reaction to treatment or activity: kneeling on knee, going up/down ladder    OBJECTIVE  Changes noted in objective findings:  Yes,   Objective: Pt is able to hold 5 min wall sit, has no pain in deep sqaut w/ 7# weight lift. No pain or valgus noted in 6\" step down. Pt still reports pain in kneeling to on floor and climbing ladders at work.     ASSESSMENT/PLAN  Patient is a 22 year old female with right side knee complaints.    Patient has the following significant findings with corresponding treatment plan.                Diagnosis 1:  Knee Pain, consistent w/ mild meniscus strain    Pain -   home program  Decreased strength -  home program  Impaired muscle performance - neuro re-education and home program    Recommendations:  This patient is ready to be discharged from therapy and continue their home treatment program.    Please refer to the daily flowsheet for treatment today, total treatment time and time spent performing 1:1 timed codes.    Linda Viera, SPT; Elvis Arthur, PT, OCS          "

## 2022-11-25 ENCOUNTER — VIRTUAL VISIT (OUTPATIENT)
Dept: FAMILY MEDICINE | Facility: OTHER | Age: 22
End: 2022-11-25
Payer: COMMERCIAL

## 2022-11-25 DIAGNOSIS — Z30.011 ENCOUNTER FOR INITIAL PRESCRIPTION OF CONTRACEPTIVE PILLS: Primary | ICD-10-CM

## 2022-11-25 DIAGNOSIS — N92.0 MENORRHAGIA WITH REGULAR CYCLE: ICD-10-CM

## 2022-11-25 PROCEDURE — 99213 OFFICE O/P EST LOW 20 MIN: CPT | Mod: GT | Performed by: PHYSICIAN ASSISTANT

## 2022-11-25 RX ORDER — NORGESTIMATE AND ETHINYL ESTRADIOL 0.25-0.035
1 KIT ORAL DAILY
Qty: 84 TABLET | Refills: 3 | Status: SHIPPED | OUTPATIENT
Start: 2022-11-25 | End: 2023-11-27

## 2022-11-25 NOTE — PROGRESS NOTES
Cristel is a 22 year old who is being evaluated via a billable video visit.      How would you like to obtain your AVS? MyChart  If the video visit is dropped, the invitation should be resent by: Text to cell phone: [phone]  Will anyone else be joining your video visit? No    Assessment & Plan     Encounter for initial prescription of contraceptive pills  Menorrhagia with regular cycle  Restarting her on the OCP she was on originally, she didn't have any issues with it.   Reviewed any potential risk factors which were negative.   We discussed how medications work, potential side effects.   Encouraged back up contraception for the first 4 weeks after starting OCP as well as regularly for STD protection.   - norgestimate-ethinyl estradiol (ORTHO-CYCLEN) 0.25-35 MG-MCG tablet; Take 1 tablet by mouth daily      Return in about 1 year (around 11/25/2023) for Medication Re-check.     Options for treatment and follow-up care were reviewed with the patient and/or guardian. Patient and/or guardian engaged in the decision making process and verbalized understanding of the options discussed and agreed with the final plan.    Erasmo Ricketts PA-C  [facility]   Cristel is a 22 year old, presenting for the following health issues:  No chief complaint on file.      HPI     - She has a lot of mood swings with her menstrual cycles.   - She even finds it difficult to do things.   - It's not very consistent, it can be more random.    - She used to get a lot of bad cramping as well.   - She also has a very heavy flow.   - She also is wanting birth control options.    - Has monthly cycles.   - Had been on birth control in the past tolerated it well  - no personal or family history of blood clots or breast cancer.      Review of Systems   Constitutional, gi and gu systems are negative, except as otherwise noted.      Objective           Vitals:  No vitals were obtained today due to virtual  visit.    Physical Exam   GENERAL: Healthy, alert and no distress  EYES: Eyes grossly normal to inspection.  No discharge or erythema, or obvious scleral/conjunctival abnormalities.  RESP: No audible wheeze, cough, or visible cyanosis.  No visible retractions or increased work of breathing.    SKIN: Visible skin clear. No significant rash, abnormal pigmentation or lesions.  NEURO: Cranial nerves grossly intact.  Mentation and speech appropriate for age.  PSYCH: Mentation appears normal, affect normal/bright, judgement and insight intact, normal speech and appearance well-groomed.            Video-Visit Details    Video Start Time: 2:45 PM    Type of service:  Video Visit    Video End Time:2:58 PM    Originating Location (pt. Location): Home    Distant Location (provider location):  Off-site    Platform used for Video Visit: Ioana

## 2022-12-03 ENCOUNTER — MYC MEDICAL ADVICE (OUTPATIENT)
Dept: FAMILY MEDICINE | Facility: OTHER | Age: 22
End: 2022-12-03

## 2023-01-04 ENCOUNTER — LAB (OUTPATIENT)
Dept: LAB | Facility: OTHER | Age: 23
End: 2023-01-04
Payer: COMMERCIAL

## 2023-01-04 ENCOUNTER — MYC MEDICAL ADVICE (OUTPATIENT)
Dept: FAMILY MEDICINE | Facility: OTHER | Age: 23
End: 2023-01-04

## 2023-01-04 ENCOUNTER — E-VISIT (OUTPATIENT)
Dept: FAMILY MEDICINE | Facility: OTHER | Age: 23
End: 2023-01-04

## 2023-01-04 DIAGNOSIS — Z11.3 SCREEN FOR STD (SEXUALLY TRANSMITTED DISEASE): Primary | ICD-10-CM

## 2023-01-04 DIAGNOSIS — N94.9 VAGINAL SYMPTOM: ICD-10-CM

## 2023-01-04 DIAGNOSIS — N94.9 VAGINAL SYMPTOM: Primary | ICD-10-CM

## 2023-01-04 PROCEDURE — 99421 OL DIG E/M SVC 5-10 MIN: CPT | Performed by: PHYSICIAN ASSISTANT

## 2023-01-04 PROCEDURE — 87210 SMEAR WET MOUNT SALINE/INK: CPT

## 2023-01-04 RX ORDER — FLUCONAZOLE 150 MG/1
150 TABLET ORAL ONCE
Qty: 1 TABLET | Refills: 0 | Status: SHIPPED | OUTPATIENT
Start: 2023-01-04 | End: 2023-01-04

## 2023-01-09 ENCOUNTER — OFFICE VISIT (OUTPATIENT)
Dept: FAMILY MEDICINE | Facility: OTHER | Age: 23
End: 2023-01-09
Payer: COMMERCIAL

## 2023-01-09 VITALS
SYSTOLIC BLOOD PRESSURE: 118 MMHG | BODY MASS INDEX: 23.05 KG/M2 | OXYGEN SATURATION: 96 % | HEIGHT: 64 IN | RESPIRATION RATE: 20 BRPM | WEIGHT: 135 LBS | TEMPERATURE: 97.7 F | DIASTOLIC BLOOD PRESSURE: 66 MMHG | HEART RATE: 89 BPM

## 2023-01-09 DIAGNOSIS — R30.0 DYSURIA: Primary | ICD-10-CM

## 2023-01-09 DIAGNOSIS — F33.2 SEVERE EPISODE OF RECURRENT MAJOR DEPRESSIVE DISORDER, WITHOUT PSYCHOTIC FEATURES (H): ICD-10-CM

## 2023-01-09 DIAGNOSIS — A63.0 GENITAL WARTS: ICD-10-CM

## 2023-01-09 DIAGNOSIS — Z11.3 SCREEN FOR STD (SEXUALLY TRANSMITTED DISEASE): ICD-10-CM

## 2023-01-09 LAB
ALBUMIN UR-MCNC: ABNORMAL MG/DL
APPEARANCE UR: CLEAR
BACTERIA #/AREA URNS HPF: ABNORMAL /HPF
BILIRUB UR QL STRIP: NEGATIVE
COLOR UR AUTO: YELLOW
GLUCOSE UR STRIP-MCNC: NEGATIVE MG/DL
HGB UR QL STRIP: NEGATIVE
KETONES UR STRIP-MCNC: NEGATIVE MG/DL
LEUKOCYTE ESTERASE UR QL STRIP: NEGATIVE
NITRATE UR QL: NEGATIVE
PH UR STRIP: 5 [PH] (ref 5–7)
RBC #/AREA URNS AUTO: ABNORMAL /HPF
SP GR UR STRIP: >=1.03 (ref 1–1.03)
SQUAMOUS #/AREA URNS AUTO: ABNORMAL /LPF
UROBILINOGEN UR STRIP-ACNC: 0.2 E.U./DL
WBC #/AREA URNS AUTO: ABNORMAL /HPF

## 2023-01-09 PROCEDURE — 99214 OFFICE O/P EST MOD 30 MIN: CPT | Performed by: FAMILY MEDICINE

## 2023-01-09 PROCEDURE — 87491 CHLMYD TRACH DNA AMP PROBE: CPT | Performed by: FAMILY MEDICINE

## 2023-01-09 PROCEDURE — 87591 N.GONORRHOEAE DNA AMP PROB: CPT | Performed by: FAMILY MEDICINE

## 2023-01-09 PROCEDURE — 81001 URINALYSIS AUTO W/SCOPE: CPT | Performed by: FAMILY MEDICINE

## 2023-01-09 ASSESSMENT — PAIN SCALES - GENERAL: PAINLEVEL: NO PAIN (0)

## 2023-01-09 NOTE — PROGRESS NOTES
Assessment & Plan       ICD-10-CM    1. Dysuria  R30.0 UA Macro with Reflex to Micro and Culture - lab collect     UA Macro with Reflex to Micro and Culture - lab collect     Urine Microscopic      2. Genital warts  A63.0 Adult Dermatology Referral      3. Screen for STD (sexually transmitted disease)  Z11.3 Chlamydia trachomatis PCR     Neisseria gonorrhoeae PCR      4. Severe episode of recurrent major depressive disorder, without psychotic features (H)  F33.2         Discussed genital warts and transmission though patient was in shock and did not have any questions she did not appear to be interacting with this news.  We gave her quite a bit of information electronically and did attempt to discuss this with her and its importance.  In the end we did tell her we could do a referral so that she could be having this discussion again with another source for treatment.  She expressed understanding of this and in the end did say that she is just feeling a little overwhelmed by this news. Not feeling depressed, but is overwhelmed with this info. Needs follow-up with primary when able to address mood as this may be influenced by this news.    Review of the result(s) of each unique test - UA  32 minutes spent on the date of the encounter doing chart review, history and exam, documentation and further activities per the note        Return in about 4 weeks (around 2/6/2023) for mood.    Kala Chavez MD, MD  [facility]    Vanita Fam is a 22 year old, presenting for the following health issues:  UTI      HPI     Genitourinary - Female  Onset/Duration: last Monday or Tuesday  Description:   Painful urination (Dysuria): YES - mild           Frequency: YES  Blood in urine (Hematuria): No  Delay in urine (Hesitency): No  Intensity: mild  Progression of Symptoms:  worsening  Accompanying Signs & Symptoms:  Fever/chills: YES  Flank pain: YES  Nausea and vomiting: No  Vaginal symptoms: discharge and  "itching  Abdominal/Pelvic Pain: YES  History:   History of frequent UTI s: No  History of kidney stones: No  Sexually Active: YES  Possibility of pregnancy: No  Precipitating or alleviating factors: None  Therapies tried and outcome: Cranberry juice prn (contraindicated in Coumadin patients)       Review of Systems   Constitutional, HEENT, cardiovascular, pulmonary, GI, , musculoskeletal, neuro, skin, endocrine and psych systems are negative, except as otherwise noted.      Objective    /66   Pulse 89   Temp 97.7  F (36.5  C) (Temporal)   Resp 20   Ht 1.613 m (5' 3.5\")   Wt 61.2 kg (135 lb)   LMP 12/26/2022   SpO2 96%   BMI 23.54 kg/m    Body mass index is 23.54 kg/m .  Physical Exam   GENERAL: healthy, alert and no distress  RESP: lungs clear to auscultation - no rales, rhonchi or wheezes  CV: regular rate and rhythm, normal S1 S2, no S3 or S4, no murmur, click or rub, no peripheral edema and peripheral pulses strong   (female): vaginal mucosa pink, moist, well rugated and inside the inner labia was noted to have several genital warts on both sides.  NEURO: Normal strength and tone, mentation intact and speech normal  PSYCH: mentation appears normal, affect normal/bright                    "

## 2023-01-10 ENCOUNTER — TELEPHONE (OUTPATIENT)
Dept: FAMILY MEDICINE | Facility: OTHER | Age: 23
End: 2023-01-10

## 2023-01-10 DIAGNOSIS — A74.9 CHLAMYDIA INFECTION: Primary | ICD-10-CM

## 2023-01-10 LAB
C TRACH DNA SPEC QL NAA+PROBE: POSITIVE
N GONORRHOEA DNA SPEC QL NAA+PROBE: NEGATIVE

## 2023-01-10 RX ORDER — AZITHROMYCIN 500 MG/1
1000 TABLET, FILM COATED ORAL DAILY
Qty: 2 TABLET | Refills: 0 | Status: SHIPPED | OUTPATIENT
Start: 2023-01-10 | End: 2023-01-11

## 2023-01-13 ENCOUNTER — LAB (OUTPATIENT)
Dept: LAB | Facility: OTHER | Age: 23
End: 2023-01-13
Payer: COMMERCIAL

## 2023-01-13 DIAGNOSIS — Z11.3 SCREEN FOR STD (SEXUALLY TRANSMITTED DISEASE): ICD-10-CM

## 2023-01-13 PROCEDURE — 36415 COLL VENOUS BLD VENIPUNCTURE: CPT

## 2023-01-13 PROCEDURE — 86695 HERPES SIMPLEX TYPE 1 TEST: CPT

## 2023-01-13 PROCEDURE — 86696 HERPES SIMPLEX TYPE 2 TEST: CPT

## 2023-01-13 PROCEDURE — 87389 HIV-1 AG W/HIV-1&-2 AB AG IA: CPT

## 2023-01-14 ENCOUNTER — HOSPITAL ENCOUNTER (EMERGENCY)
Facility: CLINIC | Age: 23
Discharge: HOME | End: 2023-01-15
Attending: FAMILY MEDICINE | Admitting: FAMILY MEDICINE
Payer: COMMERCIAL

## 2023-01-14 ENCOUNTER — APPOINTMENT (OUTPATIENT)
Dept: GENERAL RADIOLOGY | Facility: CLINIC | Age: 23
End: 2023-01-14
Attending: FAMILY MEDICINE
Payer: COMMERCIAL

## 2023-01-14 DIAGNOSIS — S43.005A SHOULDER DISLOCATION, LEFT, INITIAL ENCOUNTER: ICD-10-CM

## 2023-01-14 PROCEDURE — 23650 CLTX SHO DSLC W/MNPJ WO ANES: CPT | Mod: LT | Performed by: FAMILY MEDICINE

## 2023-01-14 PROCEDURE — 73030 X-RAY EXAM OF SHOULDER: CPT | Mod: LT

## 2023-01-14 PROCEDURE — 99152 MOD SED SAME PHYS/QHP 5/>YRS: CPT | Performed by: FAMILY MEDICINE

## 2023-01-14 PROCEDURE — 23650 CLTX SHO DSLC W/MNPJ WO ANES: CPT | Performed by: FAMILY MEDICINE

## 2023-01-14 PROCEDURE — 99284 EMERGENCY DEPT VISIT MOD MDM: CPT | Mod: 25 | Performed by: FAMILY MEDICINE

## 2023-01-14 PROCEDURE — 96374 THER/PROPH/DIAG INJ IV PUSH: CPT | Mod: 59 | Performed by: FAMILY MEDICINE

## 2023-01-14 PROCEDURE — 250N000011 HC RX IP 250 OP 636: Performed by: FAMILY MEDICINE

## 2023-01-14 PROCEDURE — 99285 EMERGENCY DEPT VISIT HI MDM: CPT | Mod: 25 | Performed by: FAMILY MEDICINE

## 2023-01-14 RX ORDER — HYDROMORPHONE HYDROCHLORIDE 1 MG/ML
0.5 INJECTION, SOLUTION INTRAMUSCULAR; INTRAVENOUS; SUBCUTANEOUS ONCE
Status: COMPLETED | OUTPATIENT
Start: 2023-01-14 | End: 2023-01-14

## 2023-01-14 RX ADMIN — HYDROMORPHONE HYDROCHLORIDE 0.5 MG: 1 INJECTION, SOLUTION INTRAMUSCULAR; INTRAVENOUS; SUBCUTANEOUS at 23:18

## 2023-01-14 NOTE — LETTER
[facility]  Emergency Room  [address] [place]   [address]  Tel: [phone]   Fax: [phone]  January 15, 2023    Cristel Grissom  [address] [address] [place] [id number]  [phone] (home)     : 2000          To Whom it May Concern:    Cristel Grissom was seen in our ER today, January 15, 2023. I expect her condition to improve over the next 7 days.  She will not be able to use her left arm for 1 week, and will be able to return with restrictions for an additional 1 to 2 weeks after seeing the orthopedic specialist.      Please contact me for questions or concerns.    Sincerely,       Gustavo Wallace MD

## 2023-01-15 ENCOUNTER — APPOINTMENT (OUTPATIENT)
Dept: GENERAL RADIOLOGY | Facility: CLINIC | Age: 23
End: 2023-01-15
Attending: FAMILY MEDICINE
Payer: COMMERCIAL

## 2023-01-15 VITALS
BODY MASS INDEX: 24.24 KG/M2 | RESPIRATION RATE: 12 BRPM | WEIGHT: 136.8 LBS | TEMPERATURE: 98.3 F | DIASTOLIC BLOOD PRESSURE: 84 MMHG | OXYGEN SATURATION: 98 % | HEART RATE: 93 BPM | SYSTOLIC BLOOD PRESSURE: 124 MMHG | HEIGHT: 63 IN

## 2023-01-15 PROCEDURE — 258N000003 HC RX IP 258 OP 636: Performed by: FAMILY MEDICINE

## 2023-01-15 PROCEDURE — 96361 HYDRATE IV INFUSION ADD-ON: CPT | Performed by: FAMILY MEDICINE

## 2023-01-15 PROCEDURE — 999N000065 XR SHOULDER LEFT G/E 3 VIEWS: Mod: LT

## 2023-01-15 PROCEDURE — 250N000011 HC RX IP 250 OP 636: Performed by: FAMILY MEDICINE

## 2023-01-15 RX ORDER — PROPOFOL 10 MG/ML
1-2 INJECTION, EMULSION INTRAVENOUS ONCE
Status: COMPLETED | OUTPATIENT
Start: 2023-01-15 | End: 2023-01-15

## 2023-01-15 RX ADMIN — PROPOFOL 200 MG: 10 INJECTION, EMULSION INTRAVENOUS at 00:28

## 2023-01-15 RX ADMIN — SODIUM CHLORIDE 1000 ML: 9 INJECTION, SOLUTION INTRAVENOUS at 00:38

## 2023-01-15 ASSESSMENT — ACTIVITIES OF DAILY LIVING (ADL): ADLS_ACUITY_SCORE: 35

## 2023-01-15 NOTE — ED NOTES
Bed: ED10  Expected date:   Expected time:   Means of arrival:   Comments:  EMS   23 y/o F  Dislocated shoulder

## 2023-01-15 NOTE — DISCHARGE INSTRUCTIONS
You had a left shoulder dislocation which we reduced under sedation.  Please see the attached handout on recovery after sedation and shoulder dislocation.  Ice the shoulder frequently for the next couple of days.  You can add heat in 3 to 4 days if it helps.  Stay in the shoulder immobilizer tonight.  You can transition to a sling if it is easier.  Begin gentle range of motion with pendulum swings as we discussed, starting with smaller circles, and working her way up to bigger circles.  In a week I would like you to start walking your fingers up the wall..  Take ibuprofen/Tylenol as needed for mild to moderate pain.  You will receive a call next week to schedule an appointment with the sports medicine specialist.  Call us tonight if you have any questions or concerns: [phone]
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
## 2023-01-15 NOTE — ED PROVIDER NOTES
[facility] ED Provider Note   Patient: Cristel Grissom  MRN #:  [id number]  Date of Visit: January 14, 2023    CC:     Chief Complaint   Patient presents with     Arm Injury     HPI:  Cristel Grissom is a 22 year old female who presented to the emergency department by EMS with suspected left shoulder dislocation.  Patient was putting on an ice blocker on her windshield and slipped on the ice.  She had her left arm up on the neff of her car, slipped, and try to catch herself before falling.  She felt a pop in her left shoulder.  Patient last ate at about 4 hours ago.  She has a history of depression and anxiety.  She took her sertraline this evening.  She has not had any previous surgeries except for wisdom teeth.  Allergies include sulfa, Cefzil, penicillin.    Problem List:  Patient Active Problem List    Diagnosis Date Noted     MDD (major depressive disorder), recurrent episode (H) 08/26/2020     Priority: Medium     Dizziness 07/15/2020     Priority: Medium     Malaise and fatigue 07/15/2020     Priority: Medium     Stomach upset 07/15/2020     Priority: Medium     Chest discomfort - central pressure 07/15/2020     Priority: Medium     Right eye injury, initial encounter 08/09/2019     Priority: Medium     Acne vulgaris 01/25/2017     Priority: Medium     SUSU (generalized anxiety disorder) 01/25/2017     Priority: Medium       Past Medical History:   Diagnosis Date     Adjustment disorder      Anxiety      Depression      PTSD (post-traumatic stress disorder)        MEDS: clindamycin (CLINDAMAX) 1 % external gel  diclofenac (VOLTAREN) 1 % topical gel  hydrOXYzine (ATARAX) 25 MG tablet  norgestimate-ethinyl estradiol (ORTHO-CYCLEN) 0.25-35 MG-MCG tablet  propranolol (INDERAL) 10 MG tablet  sertraline (ZOLOFT) 25 MG tablet  sertraline (ZOLOFT) 50 MG tablet  tretinoin (RETIN-A) 0.1 % external cream  triamcinolone (KENALOG) 0.1 % external  "ointment        ALLERGIES:    Allergies   Allergen Reactions     Bactrim [Sulfamethoxazole W/Trimethoprim]      When very young, vomiting likely per mom     Cefzil [Cefprozil]      When very young, vomiting likely per mom     Penicillins      When very young, vomiting likely per mom     Sulfa Drugs      When very young, vomiting likely per mom       Past Surgical History:   Procedure Laterality Date     WISDOM TOOTH EXTRACTION         Social History     Tobacco Use     Smoking status: Never     Smokeless tobacco: Never   Vaping Use     Vaping Use: Never used   Substance Use Topics     Alcohol use: Not Currently     Drug use: Not Currently     Types: Marijuana         Review of Systems   Except as noted in HPI, all other systems were reviewed and are negative    Physical Exam     Vitals were reviewed  Patient Vitals for the past 12 hrs:   BP Temp Pulse Resp SpO2 Height Weight   01/15/23 0105 -- -- 93 12 98 % -- --   01/15/23 0100 124/84 -- -- -- -- -- --   01/15/23 0050 128/78 -- 75 17 100 % -- --   01/15/23 0040 116/80 -- 71 16 100 % -- --   01/15/23 0035 112/77 -- 82 12 96 % -- --   01/15/23 0030 121/85 -- 98 25 99 % -- --   01/15/23 0025 119/78 -- 91 -- 97 % -- --   01/15/23 0000 121/81 -- 83 -- 99 % -- --   01/14/23 2315 122/71 -- 106 -- 96 % -- --   01/14/23 2311 -- -- -- -- -- 1.6 m (5' 3\") 62.1 kg (136 lb 12.8 oz)   01/14/23 2306 106/87 98.3  F (36.8  C) 83 20 99 % -- --     GENERAL APPEARANCE: Alert and oriented x3, moderate distress due to left shoulder pain  FACE: normal facies  EYES: Pupils are equal  HENT: normal external exam  NECK: no adenopathy or asymmetry  RESP: normal respiratory effort; clear breath sounds bilaterally  CV: regular rate and rhythm; no significant murmurs, gallops or rubs  ABD: soft, no tenderness; no rebound or guarding; bowel sounds are normal  EXT: Left shoulder is in a triangular sling bandage; distal radial and ulnar pulse are intact.  Good capillary refill at the nailbed.  " Normal sensation distally.  SKIN: no worrisome rash  NEURO: no facial droop; no focal deficits, speech is normal        Available Lab/Imaging Results     Results for orders placed or performed during the hospital encounter of 01/14/23 (from the past 24 hour(s))   XR Shoulder Left G/E 3 Views    Narrative    EXAM: LEFT SHOULDER 3 VIEWS  LOCATION: [facility]  DATE/TIME: 1/14/2023 11:52 PM    INDICATION: Fall. Injury. Dislocation.  COMPARISON: None.      Impression    IMPRESSION:   1. Anterior dislocation of the left humeral head.  2. No visualized acute fracture of the left shoulder.    XR Shoulder Left G/E 3 Views    Narrative    EXAM: XR SHOULDER LEFT G/E 3 VIEWS  LOCATION: [facility]  DATE/TIME: 1/15/2023 12:52 AM    INDICATION: Post reduction film. Pain.  COMPARISON: X-ray left shoulder 3 views 1/14/2023 at 2342 hours.      Impression    IMPRESSION: Interval successful reduction of the dislocated left humeral head relative to the glenoid. Anatomic alignment. No fracture or calcified loose bodies. No x-ray evidence of avascular necrosis.         Procedure:  Sedation.      Expected Level: Deep Sedation    Indication:     Cute left shoulder anterior dislocation    Consent:   Risks, benefits and alternatives were discussed with the patient and informed consent for the procedure was obtained.    Timeout:     Universal protocol was followed. Time Out conducted just prior to starting the procedure. Confirmation of patient identity, site/side, specific procedure, patient positioning, and availability of appropriate and necessary equipment completed.    PO status:   NPO x4 hours    ASA Class:    Class  1    Malampati:   Grade 1    Medication:   Propofol 200 mg IV    Dilaudid 0.5 mg IV    Procedure: After the patient was adequately sedated, the patient's left arm was abducted by her side, and then externally rotated.  Patient's shoulder gently popped into the  joint.  Patient was then placed in a shoulder immobilizer.    Monitoring: Monitoring consisted of continuous cardiac monitoring of heart rate, pulse oximetry, frequent blood pressures, level of consciousness, and IV access. Patient was in constant attendance by MD and RN until patient was awake and vitally stable.    Response: Vital signs stable, airway patent and O2 saturations were stable    Patient status: Post procedure, the patient was alert, coherent and was able to tolerate po intake. Patient returned to pre-procedure baseline.    Total Physician Drug Administration/Monitoring Time:  10 minutes         Impression     Final diagnoses:   Shoulder dislocation, left         ED Course & Medical Decision Making   Cristel Grissom is a 22 year old female who presented to the emergency department by EMS with suspected left shoulder dislocation.  Patient slipped on the ice, was grabbing onto the neff of her car, and felt a pop in her left shoulder.  She has a deformity to the left shoulder, and it is wrapped in triangular sling bandage.  Patient received Dilaudid for pain, and x-ray of the left shoulder was obtained.  X-rays were personally reviewed by me and with the patient and her parents.  There is an anterior dislocation of the left humeral head.  No visualized acute fracture of the left shoulder.  Patient was given Dilaudid for pain.  She had verbally consented to the procedure before meds were given.  Her parents arrived and were in agreement with proceeding with close reduction of the shoulder dislocation with sedation.  To the procedure, we paused for the cause to identify the patient, and she was placed on supplemental oxygen and monitoring.  She received incremental doses of propofol to a total of 200 mg of propofol.  After she was adequately sedated, the patient's left arm was abducted by her side, and then externally rotated.  Patient's shoulder was easily reduced, and then placed in a shoulder  immobilizer.  Patient had no episodes of hypotension or hypoxemia.  Post reduction x-rays reveal successful reduction of the dislocated left humeral head relative to the glenoid.  No fracture or calcified loose bodies.  I reviewed recommendations following discharge.  Questions were answered.  I will refer the patient to an on surgical orthopedic specialist/sports medicine specialist for follow-up given this is her first dislocation.        Written after-visit summary and instructions were given at the time of discharge.      Discharge Instructions:   You had a left shoulder dislocation which we reduced under sedation.  Please see the attached handout on recovery after sedation and shoulder dislocation.  Ice the shoulder frequently for the next couple of days.  You can add heat in 3 to 4 days if it helps.  Stay in the shoulder immobilizer tonight.  You can transition to a sling if it is easier.  Begin gentle range of motion with pendulum swings as we discussed, starting with smaller circles, and working her way up to bigger circles.  In a week I would like you to start walking your fingers up the wall..  Take ibuprofen/Tylenol as needed for mild to moderate pain.  You will receive a call next week to schedule an appointment with the sports medicine specialist.  Call us tonight if you have any questions or concerns: [phone]       Disclaimer: This note consists of words and symbols derived from keyboarding and dictation using voice recognition software.  As a result, there may be errors that have gone undetected.  Please consider this when interpreting information found in this note.       Gt Wallace MD  01/15/23 0153

## 2023-01-16 LAB
HIV 1+2 AB+HIV1 P24 AG SERPL QL IA: NONREACTIVE
HSV1 IGG SERPL QL IA: 0.18 INDEX
HSV1 IGG SERPL QL IA: NORMAL
HSV2 IGG SERPL QL IA: 0.6 INDEX
HSV2 IGG SERPL QL IA: NORMAL

## 2023-01-24 ENCOUNTER — OFFICE VISIT (OUTPATIENT)
Dept: ORTHOPEDICS | Facility: CLINIC | Age: 23
End: 2023-01-24
Attending: FAMILY MEDICINE
Payer: COMMERCIAL

## 2023-01-24 VITALS
DIASTOLIC BLOOD PRESSURE: 70 MMHG | BODY MASS INDEX: 24.24 KG/M2 | SYSTOLIC BLOOD PRESSURE: 110 MMHG | WEIGHT: 136.8 LBS | HEIGHT: 63 IN

## 2023-01-24 DIAGNOSIS — S43.005A SHOULDER DISLOCATION, LEFT, INITIAL ENCOUNTER: Primary | ICD-10-CM

## 2023-01-24 PROCEDURE — 99214 OFFICE O/P EST MOD 30 MIN: CPT | Performed by: PHYSICIAN ASSISTANT

## 2023-01-24 ASSESSMENT — PAIN SCALES - GENERAL: PAINLEVEL: MILD PAIN (3)

## 2023-01-24 NOTE — PROGRESS NOTES
ORTHOPEDIC CONSULT      Chief Complaint: Cristel Grissom is a 22 year old right hand dominant female who currently works at [employer].  She did attend [employer] and [employer] but is not there now and does want to go back.  She enjoys watching [employer] and [employer].  She is here with her mother Fabienne    She is being seen for   Chief Complaints and History of Present Illnesses   Patient presents with     Shoulder Pain     Left shoulder injury DOI; 1/14/2023     Consult     Ref: ED     I reviewed the note in detail from the emergency department visit on 1/14/2023 by Dr. Wallace    History of Present Illness:   Mechanism of Injury: Patient was putting a ice blocker on her windshield when she slipped on the ice and her left arm was up on the neff try to catch herself before falling and felt a pop in her left shoulder.  Location: Left shoulder  Duration of Pain: Since date of injury 10 days ago  Rating of Pain: 3 out of 10  Pain Quality: Apprehension  Pain is better with: Rest and sling/immobilizer  Pain is worse with: Trying to lift arm  Treatment so far consists of: Patient was seen in the emergency department on 11/14/2023.  X-ray showed an anterior shoulder dislocation.  Patient had conscious sedation and had a reduction.  X-rays confirmed the reduction.  Patient was referred to orthopedics and given a sling as well as a shoulder immobilizer.  Patient wears a sling during the day and the shoulder immobilizer at night because it gives her a little more confidence.  Associated Features: Denies numbness or tingling shooting burning electric pain.  Prior history of related problems: No previous surgery or trauma or fracture to the left shoulder.  Pain is Limiting: Use of left upper extremity   Here to: Orthopedic consultation  The Pain Has: Gotten slightly better  Additional History: Patient denies shoulder going back out of socket at all since it was relocated.      Patient's past medical, surgical, social and family histories  reviewed.     Past Medical History:   Diagnosis Date     Adjustment disorder      Anxiety      Depression      PTSD (post-traumatic stress disorder)         Past Surgical History:   Procedure Laterality Date     WISDOM TOOTH EXTRACTION         Medications:  clindamycin (CLINDAMAX) 1 % external gel, Apply topically 2 times daily  hydrOXYzine (ATARAX) 25 MG tablet, Take 1 tablet at bedtime and another 1 tablet daily as needed  norgestimate-ethinyl estradiol (ORTHO-CYCLEN) 0.25-35 MG-MCG tablet, Take 1 tablet by mouth daily  propranolol (INDERAL) 10 MG tablet, Take 10 mg by mouth as needed Taking once daily in the morning, then PRN throughout the day.  sertraline (ZOLOFT) 25 MG tablet, Take 25 tablets by mouth daily Taken with 50 mg to total 75mg  sertraline (ZOLOFT) 50 MG tablet, Take 50 mg by mouth daily Taken with 25 mg to total 75mg  tretinoin (RETIN-A) 0.1 % external cream, Apply a pea size to entire face QD  diclofenac (VOLTAREN) 1 % topical gel, Apply 2 g topically 4 times daily (Patient not taking: Reported on 1/9/2023)  triamcinolone (KENALOG) 0.1 % external ointment, Apply topically 2 times daily (Patient not taking: Reported on 1/9/2023)    No current facility-administered medications on file prior to visit.      Allergies   Allergen Reactions     Bactrim [Sulfamethoxazole W/Trimethoprim]      When very young, vomiting likely per mom     Cefzil [Cefprozil]      When very young, vomiting likely per mom     Penicillins      When very young, vomiting likely per mom     Sulfa Drugs      When very young, vomiting likely per mom       Social History     Occupational History     Not on file   Tobacco Use     Smoking status: Never     Smokeless tobacco: Never   Vaping Use     Vaping Use: Never used   Substance and Sexual Activity     Alcohol use: Not Currently     Drug use: Not Currently     Types: Marijuana     Sexual activity: Not Currently     Partners: Male     Birth control/protection: Condom       Family  "History   Problem Relation Age of Onset     Anxiety Disorder Sister      Myocardial Infarction Paternal Grandfather      Substance Abuse Maternal Uncle      Melanoma No family hx of        REVIEW OF SYSTEMS  10 point review systems performed otherwise negative as noted as per history of present illness.    Physical Exam:  Vitals: /70   Ht 1.6 m (5' 3\")   Wt 62.1 kg (136 lb 12.8 oz)   LMP 12/26/2022   BMI 24.23 kg/m    BMI= Body mass index is 24.23 kg/m .    Constitutional: healthy, alert and no acute distress   Psychiatric: mentation appears normal and affect normal/bright  NEURO: no focal deficits, CMS intact left upper extremity   RESP: Normal with easy respirations and no use of accessory muscles to breathe, no audible wheezing or retractions  CV: +2 radial pulse and her hand is warm to palpation.   SKIN: No erythema, rashes, excoriation, or breakdown. No evidence of infection.  Other skin I see today.  I did not have her change into a gown.  MUSCULOSKELETAL:    INSPECTION of left shoulder: No gross deformities    PALPATION: No tenderness AC joint, proximal biceps tendon, clavicle, lateral shoulder, posterior shoulder, trapezius area. No increased warmth noted.     ROM: Passive: Forward flexion to 55, abduction to 65, external rotation to 45, internal rotation to back pocket.  All range of motion is without catching, locking or pain.  I did the range of motion very gently to make sure the shoulder is located and moving well in the glenoid.     STRENGTH: 5 out of 5 , interosseous and thumb without pain.  5 out of 5 deltoid strength    SPECIAL TEST: none.  But I did manipulate the humeral head gently and it felt stable.  GAIT: non-antalgic  Lymph: no palpable lymph nodes    Diagnostic Modalities:  Recent Results (from the past 744 hour(s))   XR Shoulder Left G/E 3 Views    Narrative    EXAM: LEFT SHOULDER 3 VIEWS  LOCATION: [facility]  DATE/TIME: 1/14/2023 11:52 " PM    INDICATION: Fall. Injury. Dislocation.  COMPARISON: None.      Impression    IMPRESSION:   1. Anterior dislocation of the left humeral head.  2. No visualized acute fracture of the left shoulder.    XR Shoulder Left G/E 3 Views    Narrative    EXAM: XR SHOULDER LEFT G/E 3 VIEWS  LOCATION: [facility]  DATE/TIME: 1/15/2023 12:52 AM    INDICATION: Post reduction film. Pain.  COMPARISON: X-ray left shoulder 3 views 1/14/2023 at 2342 hours.      Impression    IMPRESSION: Interval successful reduction of the dislocated left humeral head relative to the glenoid. Anatomic alignment. No fracture or calcified loose bodies. No x-ray evidence of avascular necrosis.     I agree with the above readings.    Independent visualization of the images was performed.    Impression: 1.  10 days status post left shoulder anterior dislocation, first-time.  2.  10-day status post left shoulder conscious sedation and reduction in the emergency department.    Plan:  All of the above pertinent physical exam and imaging modalities findings was reviewed with Cristel and her mother Fabienne.      FOCUSED PLAN:  22-year-old female status post left shoulder anterior dislocation and reduction in the emergency department 10 days ago.  Patient can continue shoulder immobilizer and sling as needed but can now start weaning out at home and can be done with sling at 4 week keturah.  Discussed finger wrist elbow and Codman range of motion 3 times a day.  Put in an order for formal physical therapy to start in 2 weeks working on passive range of motion and then 2 weeks after that at the 6-week keturah can start active active assisted range of motion as well as rotator cuff strengthening and periscapular strengthening although throughout the sessions avoiding combined forward flexion and external rotation.  We get a work note stating okay to use the sling for another 2 weeks if she would like and no lifting pulling or pushing  greater than 1 pound to waist level for the next 8 weeks.  I plan on seeing the patient back in 2 weeks and then again 6 weeks after that.  Follow-up in 2 weeks.    Re-x-ray on return: No      This note was dictated with [place].    Daniel Medina PA-C

## 2023-01-24 NOTE — LETTER
January 24, 2023        Cristel Grissom  [address]  [place] [facility] [place] [address]          To whom it may concern:    RE: Cristel Grissom    Patient was seen and treated today at our clinic.  Patient may return to work 1/25/2023 with the following:  With Left upper extremity- no lift, push or pull greater than 1lb, waist level only. Ok to wear sling or shoulder immobilizer until Feb 7th 2023 then if patient wants to wear it still she may. These restrictions are good for 8 weeks.    Next appointment is 2 weeks.     Please contact me for questions or concerns.      Sincerely,        Daniel Medina PA-C

## 2023-01-24 NOTE — LETTER
1/24/2023         RE: Cristel Grissom  [address] [facility] [address]        Dear Colleague,    Thank you for referring your patient, Cristel Grissom, to the [facility]. Please see a copy of my visit note below.    ORTHOPEDIC CONSULT      Chief Complaint: Cristel Grissom is a 22 year old right hand dominant female who currently works at [employer].  She did attend [employer] and [employer] but is not there now and does want to go back.  She enjoys watching [employer] and [employer].  She is here with her mother Fabienne    She is being seen for   Chief Complaints and History of Present Illnesses   Patient presents with     Shoulder Pain     Left shoulder injury DOI; 1/14/2023     Consult     Ref: ED     I reviewed the note in detail from the emergency department visit on 1/14/2023 by Dr. Wallace    History of Present Illness:   Mechanism of Injury: Patient was putting a ice blocker on her windshield when she slipped on the ice and her left arm was up on the neff try to catch herself before falling and felt a pop in her left shoulder.  Location: Left shoulder  Duration of Pain: Since date of injury 10 days ago  Rating of Pain: 3 out of 10  Pain Quality: Apprehension  Pain is better with: Rest and sling/immobilizer  Pain is worse with: Trying to lift arm  Treatment so far consists of: Patient was seen in the emergency department on 11/14/2023.  X-ray showed an anterior shoulder dislocation.  Patient had conscious sedation and had a reduction.  X-rays confirmed the reduction.  Patient was referred to orthopedics and given a sling as well as a shoulder immobilizer.  Patient wears a sling during the day and the shoulder immobilizer at night because it gives her a little more confidence.  Associated Features: Denies numbness or tingling shooting burning electric pain.  Prior history of related problems: No previous surgery or trauma or fracture to the left shoulder.  Pain is Limiting: Use of left  upper extremity   Here to: Orthopedic consultation  The Pain Has: Gotten slightly better  Additional History: Patient denies shoulder going back out of socket at all since it was relocated.      Patient's past medical, surgical, social and family histories reviewed.     Past Medical History:   Diagnosis Date     Adjustment disorder      Anxiety      Depression      PTSD (post-traumatic stress disorder)         Past Surgical History:   Procedure Laterality Date     WISDOM TOOTH EXTRACTION         Medications:  clindamycin (CLINDAMAX) 1 % external gel, Apply topically 2 times daily  hydrOXYzine (ATARAX) 25 MG tablet, Take 1 tablet at bedtime and another 1 tablet daily as needed  norgestimate-ethinyl estradiol (ORTHO-CYCLEN) 0.25-35 MG-MCG tablet, Take 1 tablet by mouth daily  propranolol (INDERAL) 10 MG tablet, Take 10 mg by mouth as needed Taking once daily in the morning, then PRN throughout the day.  sertraline (ZOLOFT) 25 MG tablet, Take 25 tablets by mouth daily Taken with 50 mg to total 75mg  sertraline (ZOLOFT) 50 MG tablet, Take 50 mg by mouth daily Taken with 25 mg to total 75mg  tretinoin (RETIN-A) 0.1 % external cream, Apply a pea size to entire face QD  diclofenac (VOLTAREN) 1 % topical gel, Apply 2 g topically 4 times daily (Patient not taking: Reported on 1/9/2023)  triamcinolone (KENALOG) 0.1 % external ointment, Apply topically 2 times daily (Patient not taking: Reported on 1/9/2023)    No current facility-administered medications on file prior to visit.      Allergies   Allergen Reactions     Bactrim [Sulfamethoxazole W/Trimethoprim]      When very young, vomiting likely per mom     Cefzil [Cefprozil]      When very young, vomiting likely per mom     Penicillins      When very young, vomiting likely per mom     Sulfa Drugs      When very young, vomiting likely per mom       Social History     Occupational History     Not on file   Tobacco Use     Smoking status: Never     Smokeless tobacco: Never  "  Vaping Use     Vaping Use: Never used   Substance and Sexual Activity     Alcohol use: Not Currently     Drug use: Not Currently     Types: Marijuana     Sexual activity: Not Currently     Partners: Male     Birth control/protection: Condom       Family History   Problem Relation Age of Onset     Anxiety Disorder Sister      Myocardial Infarction Paternal Grandfather      Substance Abuse Maternal Uncle      Melanoma No family hx of        REVIEW OF SYSTEMS  10 point review systems performed otherwise negative as noted as per history of present illness.    Physical Exam:  Vitals: /70   Ht 1.6 m (5' 3\")   Wt 62.1 kg (136 lb 12.8 oz)   LMP 12/26/2022   BMI 24.23 kg/m    BMI= Body mass index is 24.23 kg/m .    Constitutional: healthy, alert and no acute distress   Psychiatric: mentation appears normal and affect normal/bright  NEURO: no focal deficits, CMS intact left upper extremity   RESP: Normal with easy respirations and no use of accessory muscles to breathe, no audible wheezing or retractions  CV: +2 radial pulse and her hand is warm to palpation.   SKIN: No erythema, rashes, excoriation, or breakdown. No evidence of infection.  Other skin I see today.  I did not have her change into a gown.  MUSCULOSKELETAL:    INSPECTION of left shoulder: No gross deformities    PALPATION: No tenderness AC joint, proximal biceps tendon, clavicle, lateral shoulder, posterior shoulder, trapezius area. No increased warmth noted.     ROM: Passive: Forward flexion to 55, abduction to 65, external rotation to 45, internal rotation to back pocket.  All range of motion is without catching, locking or pain.  I did the range of motion very gently to make sure the shoulder is located and moving well in the glenoid.     STRENGTH: 5 out of 5 , interosseous and thumb without pain.  5 out of 5 deltoid strength    SPECIAL TEST: none.  But I did manipulate the humeral head gently and it felt stable.  GAIT: non-antalgic  Lymph: no " palpable lymph nodes    Diagnostic Modalities:  Recent Results (from the past 744 hour(s))   XR Shoulder Left G/E 3 Views    Narrative    EXAM: LEFT SHOULDER 3 VIEWS  LOCATION: [facility]  DATE/TIME: 1/14/2023 11:52 PM    INDICATION: Fall. Injury. Dislocation.  COMPARISON: None.      Impression    IMPRESSION:   1. Anterior dislocation of the left humeral head.  2. No visualized acute fracture of the left shoulder.    XR Shoulder Left G/E 3 Views    Narrative    EXAM: XR SHOULDER LEFT G/E 3 VIEWS  LOCATION: [facility]  DATE/TIME: 1/15/2023 12:52 AM    INDICATION: Post reduction film. Pain.  COMPARISON: X-ray left shoulder 3 views 1/14/2023 at 2342 hours.      Impression    IMPRESSION: Interval successful reduction of the dislocated left humeral head relative to the glenoid. Anatomic alignment. No fracture or calcified loose bodies. No x-ray evidence of avascular necrosis.     I agree with the above readings.    Independent visualization of the images was performed.    Impression: 1.  10 days status post left shoulder anterior dislocation, first-time.  2.  10-day status post left shoulder conscious sedation and reduction in the emergency department.    Plan:  All of the above pertinent physical exam and imaging modalities findings was reviewed with Cristel and her mother Fabienne.      FOCUSED PLAN:  22-year-old female status post left shoulder anterior dislocation and reduction in the emergency department 10 days ago.  Patient can continue shoulder immobilizer and sling as needed but can now start weaning out at home and can be done with sling at 4 week keturah.  Discussed finger wrist elbow and Codman range of motion 3 times a day.  Put in an order for formal physical therapy to start in 2 weeks working on passive range of motion and then 2 weeks after that at the 6-week keturah can start active active assisted range of motion as well as rotator cuff strengthening  and periscapular strengthening although throughout the sessions avoiding combined forward flexion and external rotation.  We get a work note stating okay to use the sling for another 2 weeks if she would like and no lifting pulling or pushing greater than 1 pound to waist level for the next 8 weeks.  I plan on seeing the patient back in 2 weeks and then again 6 weeks after that.  Follow-up in 2 weeks.    Re-x-ray on return: No      This note was dictated with [facility].    Daniel Medina PA-C        Again, thank you for allowing me to participate in the care of your patient.        Sincerely,        Daniel Medina PA-C

## 2023-02-07 ENCOUNTER — OFFICE VISIT (OUTPATIENT)
Dept: ORTHOPEDICS | Facility: CLINIC | Age: 23
End: 2023-02-07
Payer: COMMERCIAL

## 2023-02-07 VITALS
HEIGHT: 63 IN | WEIGHT: 135.5 LBS | DIASTOLIC BLOOD PRESSURE: 74 MMHG | SYSTOLIC BLOOD PRESSURE: 120 MMHG | BODY MASS INDEX: 24.01 KG/M2

## 2023-02-07 DIAGNOSIS — S43.005A SHOULDER DISLOCATION, LEFT, INITIAL ENCOUNTER: Primary | ICD-10-CM

## 2023-02-07 PROCEDURE — 99213 OFFICE O/P EST LOW 20 MIN: CPT | Performed by: PHYSICIAN ASSISTANT

## 2023-02-07 ASSESSMENT — PAIN SCALES - GENERAL: PAINLEVEL: NO PAIN (1)

## 2023-02-07 NOTE — LETTER
2/7/2023          Cristel Grissom  [address] [facility] [address]          To whom it may concern:    RE: Cristel Grissom    Patient was seen and treated today at our clinic.  Patient may return to work with the following:  With Left upper extremity- no lift, push or pull greater than 1lb, waist level only for 2 months. Ok to wear sling or shoulder immobilizer until Feb. 21st 2023.     Next appointment is 2 months.    Please contact me for questions or concerns.      Sincerely,        Daniel Medina PA-C

## 2023-02-07 NOTE — PROGRESS NOTES
Office Visit-Follow up    Chief Complaint: Cristel Grissom is a 22 year old female who is being seen for   Chief Complaint   Patient presents with     RECHECK     left shoulder anterior dislocation, first-time- DOI: 1/14/2023       History of Present Illness:   Mechanism of Injury: Patient was putting a ice blocker on her windshield when she slipped on the ice and her left arm was up on the neff try to catch herself before falling and felt a pop in her left shoulder.  Location: Left shoulder  Duration of Pain: Since date of injury, 24 days ago however much better now.  Rating of Pain: 2 out of 10  Pain Quality: Achy but very minimal  Pain is better with: In sling and shoulder immobilizer  Pain is worse with: Pushing on shoulder.  Treatment so far consists of: Patient was last seen on 1/24/2023 by myself and at that time patient was to continue sling and shoulder immobilizer but gradually wean out at the 4-week keturah.  Patient was to do finger wrist elbow and Codman exercises 3 times a day.  An order was put in for formal physical therapy to start in 2 weeks doing passive range of motion then at the 6-week keturah we would start active and active assisted range of motion as well as rotator cuff strengthening and periscapular strengthening.  We wrote a work note saying it is okay to wear the sling for the next 2 weeks and no lifting pulling or pushing greater than 1 pound at waist level for the next 8 weeks.  Patient presents today stating that she is trying to do her range of motion although she forgets to do her Codman she is working on her elbow range of motion.  She is doing well and work with the restrictions.  She is wearing her sling at work.  She has not started physical therapy quite yet.  Associated Features: Denies numbness tingling shooting burning or electric pain.  Pain is Limiting: Heavy use of the left shoulder  Here to: Orthopedic follow-up  Additional History: Patient is here with her grandmother today  "who is driving her and her grandmother's name is Coco.    REVIEW OF SYSTEMS  General: negative for, night sweats, dizziness, fatigue  Resp: No shortness of breath and no cough  CV: negative for chest pain, syncope or near-syncope  GI: negative for nausea, vomiting and diarrhea  : negative for dysuria and hematuria  Musculoskeletal: as above  Neurologic: negative for syncope   Hematologic: negative for bleeding disorder    Physical Exam:  Vitals: /74   Ht 1.6 m (5' 3\")   Wt 61.5 kg (135 lb 8 oz)   LMP 12/26/2022   BMI 24.00 kg/m    BMI= Body mass index is 24 kg/m .  Constitutional: healthy, alert and no acute distress   Psychiatric: mentation appears normal and affect normal/bright  NEURO: no focal deficits, CMS intact left upper extremity   RESP: Normal with easy respirations and no use of accessory muscles to breathe, no audible wheezing or retractions  CV: +2 radial pulse and her hand is warm to palpation.   SKIN: No erythema, rashes, excoriation, or breakdown. No evidence of infection.   MUSCULOSKELETAL:    INSPECTION of left shoulder: No gross deformities, erythema, edema, ecchymosis, atrophy or fasciculations.     PALPATION: Very slight tenderness to palpation of the humeral head.  No tenderness AC joint, proximal biceps tendon, clavicle, trapezius area. No increased warmth noted.     ROM: Passive: Forward flexion to 120, abduction to 115, external rotation to 45, internal rotation to lumbar spine.  All range of motion is without catching, locking or pain however the patient notices she is a little uncomfortable when she gets to 115 with forward flexion and also with abduction.      STRENGTH: 5 out of 5 , interosseous and thumb without pain.  5 out of 5 deltoid strength.    SPECIAL TEST: None today  GAIT: non-antalgic  Lymph: no palpable lymph nodes      Diagnostic Modalities:  Recent Results (from the past 744 hour(s))   XR Shoulder Left G/E 3 Views    Narrative    EXAM: LEFT SHOULDER 3 " VIEWS  LOCATION: [facility]  DATE/TIME: 1/14/2023 11:52 PM    INDICATION: Fall. Injury. Dislocation.  COMPARISON: None.      Impression    IMPRESSION:   1. Anterior dislocation of the left humeral head.  2. No visualized acute fracture of the left shoulder.    XR Shoulder Left G/E 3 Views    Narrative    EXAM: XR SHOULDER LEFT G/E 3 VIEWS  LOCATION: [facility]  DATE/TIME: 1/15/2023 12:52 AM    INDICATION: Post reduction film. Pain.  COMPARISON: X-ray left shoulder 3 views 1/14/2023 at 2342 hours.      Impression    IMPRESSION: Interval successful reduction of the dislocated left humeral head relative to the glenoid. Anatomic alignment. No fracture or calcified loose bodies. No x-ray evidence of avascular necrosis.     I agree with above readings.    No radiographs necessary today.    Independent visualization of the images was performed.      Impression: 1.    24 days status post left shoulder anterior dislocation, first-time.  2.    24-day status post left shoulder conscious sedation and reduction in the emergency department.       Plan:  All of the above pertinent physical exam and imaging modalities findings was reviewed with Cristel and her grandmother Coco.    FOCUSED PLAN:   22-year-old female with left anterior shoulder dislocation which was reduced in the emergency department.  Patient to continue range of motion fingers wrist elbow and Codman's of shoulder.  She can start passive range of motion under the guidance of formal physical therapy which starts on 2/9/2023.  She will advance to active and active assisted range of motion as well as strengthening when the patient is 6 weeks out which will be in about 2 weeks.  They will avoid combined forward flexion and external rotation throughout therapy but they will progress her.  I will see the patient back in 2 months where the patient will be 3 months out from her dislocation.  As far as work I  allowed her to continue using the sling and shoulder immobilizer as she chooses for the next 2 weeks at work and also that she can not push pull or lift greater than 1 pound to waist level with the left lower extremity for the next 2 months.  Follow-up in 2 months.    Re-x-ray on return: No      This note was dictated with [employer].    Daniel Medina PA-C

## 2023-02-07 NOTE — LETTER
2/7/2023         RE: Cristel Grissom  [address] [facility] [address]        Dear Colleague,    Thank you for referring your patient, Cristel Grissom, to the [facility]. Please see a copy of my visit note below.    Office Visit-Follow up    Chief Complaint: Cristel Grissom is a 22 year old female who is being seen for   Chief Complaint   Patient presents with     RECHECK     left shoulder anterior dislocation, first-time- DOI: 1/14/2023       History of Present Illness:   Mechanism of Injury: Patient was putting a ice blocker on her windshield when she slipped on the ice and her left arm was up on the neff try to catch herself before falling and felt a pop in her left shoulder.  Location: Left shoulder  Duration of Pain: Since date of injury, 24 days ago however much better now.  Rating of Pain: 2 out of 10  Pain Quality: Achy but very minimal  Pain is better with: In sling and shoulder immobilizer  Pain is worse with: Pushing on shoulder.  Treatment so far consists of: Patient was last seen on 1/24/2023 by myself and at that time patient was to continue sling and shoulder immobilizer but gradually wean out at the 4-week keturah.  Patient was to do finger wrist elbow and Codman exercises 3 times a day.  An order was put in for formal physical therapy to start in 2 weeks doing passive range of motion then at the 6-week keturah we would start active and active assisted range of motion as well as rotator cuff strengthening and periscapular strengthening.  We wrote a work note saying it is okay to wear the sling for the next 2 weeks and no lifting pulling or pushing greater than 1 pound at waist level for the next 8 weeks.  Patient presents today stating that she is trying to do her range of motion although she forgets to do her Codman she is working on her elbow range of motion.  She is doing well and work with the restrictions.  She is wearing her sling at work.  She has not started  "physical therapy quite yet.  Associated Features: Denies numbness tingling shooting burning or electric pain.  Pain is Limiting: Heavy use of the left shoulder  Here to: Orthopedic follow-up  Additional History: Patient is here with her grandmother today who is driving her and her grandmother's name is Coco.    REVIEW OF SYSTEMS  General: negative for, night sweats, dizziness, fatigue  Resp: No shortness of breath and no cough  CV: negative for chest pain, syncope or near-syncope  GI: negative for nausea, vomiting and diarrhea  : negative for dysuria and hematuria  Musculoskeletal: as above  Neurologic: negative for syncope   Hematologic: negative for bleeding disorder    Physical Exam:  Vitals: /74   Ht 1.6 m (5' 3\")   Wt 61.5 kg (135 lb 8 oz)   LMP 12/26/2022   BMI 24.00 kg/m    BMI= Body mass index is 24 kg/m .  Constitutional: healthy, alert and no acute distress   Psychiatric: mentation appears normal and affect normal/bright  NEURO: no focal deficits, CMS intact left upper extremity   RESP: Normal with easy respirations and no use of accessory muscles to breathe, no audible wheezing or retractions  CV: +2 radial pulse and her hand is warm to palpation.   SKIN: No erythema, rashes, excoriation, or breakdown. No evidence of infection.   MUSCULOSKELETAL:    INSPECTION of left shoulder: No gross deformities, erythema, edema, ecchymosis, atrophy or fasciculations.     PALPATION: Very slight tenderness to palpation of the humeral head.  No tenderness AC joint, proximal biceps tendon, clavicle, trapezius area. No increased warmth noted.     ROM: Passive: Forward flexion to 120, abduction to 115, external rotation to 45, internal rotation to lumbar spine.  All range of motion is without catching, locking or pain however the patient notices she is a little uncomfortable when she gets to 115 with forward flexion and also with abduction.      STRENGTH: 5 out of 5 , interosseous and thumb without pain.  5 " out of 5 deltoid strength.    SPECIAL TEST: None today  GAIT: non-antalgic  Lymph: no palpable lymph nodes      Diagnostic Modalities:  Recent Results (from the past 744 hour(s))   XR Shoulder Left G/E 3 Views    Narrative    EXAM: LEFT SHOULDER 3 VIEWS  LOCATION: [facility]  DATE/TIME: 1/14/2023 11:52 PM    INDICATION: Fall. Injury. Dislocation.  COMPARISON: None.      Impression    IMPRESSION:   1. Anterior dislocation of the left humeral head.  2. No visualized acute fracture of the left shoulder.    XR Shoulder Left G/E 3 Views    Narrative    EXAM: XR SHOULDER LEFT G/E 3 VIEWS  LOCATION: [facility]  DATE/TIME: 1/15/2023 12:52 AM    INDICATION: Post reduction film. Pain.  COMPARISON: X-ray left shoulder 3 views 1/14/2023 at 2342 hours.      Impression    IMPRESSION: Interval successful reduction of the dislocated left humeral head relative to the glenoid. Anatomic alignment. No fracture or calcified loose bodies. No x-ray evidence of avascular necrosis.     I agree with above readings.    No radiographs necessary today.    Independent visualization of the images was performed.      Impression: 1.    24 days status post left shoulder anterior dislocation, first-time.  2.    24-day status post left shoulder conscious sedation and reduction in the emergency department.       Plan:  All of the above pertinent physical exam and imaging modalities findings was reviewed with Cristel and her grandmother Coco.    FOCUSED PLAN:   22-year-old female with left anterior shoulder dislocation which was reduced in the emergency department.  Patient to continue range of motion fingers wrist elbow and Codman's of shoulder.  She can start passive range of motion under the guidance of formal physical therapy which starts on 2/9/2023.  She will advance to active and active assisted range of motion as well as strengthening when the patient is 6 weeks out which will be in  about 2 weeks.  They will avoid combined forward flexion and external rotation throughout therapy but they will progress her.  I will see the patient back in 2 months where the patient will be 3 months out from her dislocation.  As far as work I allowed her to continue using the sling and shoulder immobilizer as she chooses for the next 2 weeks at work and also that she can not push pull or lift greater than 1 pound to waist level with the left lower extremity for the next 2 months.  Follow-up in 2 months.    Re-x-ray on return: No      This note was dictated with [employer].    Daniel Medina PA-C                Again, thank you for allowing me to participate in the care of your patient.        Sincerely,        Daniel Medina PA-C

## 2023-02-09 ENCOUNTER — THERAPY VISIT (OUTPATIENT)
Dept: PHYSICAL THERAPY | Facility: CLINIC | Age: 23
End: 2023-02-09
Payer: COMMERCIAL

## 2023-02-09 DIAGNOSIS — M25.512 ACUTE PAIN OF LEFT SHOULDER: ICD-10-CM

## 2023-02-09 DIAGNOSIS — S43.005D SHOULDER DISLOCATION, LEFT, SUBSEQUENT ENCOUNTER: ICD-10-CM

## 2023-02-09 DIAGNOSIS — S43.005A SHOULDER DISLOCATION, LEFT, INITIAL ENCOUNTER: ICD-10-CM

## 2023-02-09 PROCEDURE — 97110 THERAPEUTIC EXERCISES: CPT | Mod: GP | Performed by: PHYSICAL THERAPIST

## 2023-02-09 PROCEDURE — 97161 PT EVAL LOW COMPLEX 20 MIN: CPT | Mod: GP | Performed by: PHYSICAL THERAPIST

## 2023-02-09 NOTE — PROGRESS NOTES
Physical Therapy Initial Evaluation  Subjective:  The history is provided by the patient. No  was used.   Patient Health History  Cristel Grissom being seen for dislocated shoulder L.     Problem began: 1/14/2023.   Problem occurred: fell on the ice   Pain is reported as 3/10 on pain scale.  General health as reported by patient is excellent.  Pertinent medical history includes: concussions/dizziness, depression and mental illness.   Red flags:  None as reported by patient.   Other medical allergies details: bactrim, cefzil, penicillin, sulfa drugs.    Other surgery history details: wisdom teeth.    Current medications:  Anti-depressants. Other medications details: panic attack meds.    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   Primary job tasks include:  Driving, lifting/carrying, prolonged standing, pushing/pulling and repetitive tasks.                  Therapist Generated HPI Evaluation  Problem details: She leaning on the neff of her car with her L hand behind her.  She slipped on the ice and fell forward and her L arm got pulled into extension when she tried to brace herself with her L arm.  She had to have her shoulder relocated in the ED.  She is still wearing her sling.  She has limited her motion in her shoulder due to fear. She wears a sling to bed and work. .         Type of problem:  Left shoulder.    This is a new condition.  Condition occurred with:  A fall.  Where condition occurred: at home.  Patient reports pain:  Anterior.  Pain is described as aching and is intermittent.  Radiates to: none. Pain timing: with activity and certain positions of L arm.  Since onset symptoms are gradually improving.  Associated symptoms:  Dislocating/subluxing, loss of motion/stiffness and edema. Symptoms are exacerbated by certain positions, using arm at shoulder level, using arm behind back, using arm overhead, lying on extremity, lifting and carrying  and relieved by  bracing/immobilizing.  Special tests included:  X-ray (anterior dislocation L GHJ).    Restrictions due to condition include:  Working in normal job with restrictions.  Barriers include:  None as reported by patient.                        Objective:  System    Physical Exam    General     ROS  Cristel Grissom , : 2000, MRN: [id number]    Physical Therapy Objective Findings  Subjective information, goals, clinical impression, daily documentation and other information found in EPISODES tab.  Shoulder Objective Findings  Posture Comments: normal  Screens:                                                                                            Right                                                        Left                          Cervical (ROM, quadrant)  - quad, normal ROM   Thoracic Mobility     TOS (marlene, Adson, Zully, Youngblood)              Range of Motion:                                             Right AROM          Right PROM            Left AROM             Left PROM           Flexion wnl  62 90   Abduction wnl  45 90   External Rotation wnl in 0 degrees  25 in 0 degrees 50 in 0 degrees   Internal Rotation wnl in 0 degrees  S1 in 0 degrees    Other:         Scapulothoracic Rhythm: Early upward rotation    Manual Muscle Testing (graded 0-5, measured at 0 degrees unless otherwise noted):                                                                                                  Right                                                    Left                             Flexion  4 (+)   Abduction  4- (++)   External Rotation (at 0)  4 (+)   Internal Rotation (at 0)  4 (++)   Extension     Mid Trap     Lower Trap     Other: biceps  triceps  4+ (+)  5   (+ mild pain, ++ moderate pain, +++ severe pain)    Special Tests:                                                                                             Right                                                    Left                              NEER'S     Urbano/Gold     Coracoid     Bursa     Dallas's     Load and Shift      Relocation test  +   Sulcus test     Crossover     OTHER:       Flexibility:                                                                                                 Right                                                    Left                             Pec Minor (supine)  Mild tightness    Other     Other       Joint Play                                                                                              Right                                                  Left                            Glenohumeral  increased laxity with anterior glide   ACJ     SCJ       Palpation:  Mild tender L pec minor, tender L anterior shoulders    Assessment/Plan:    Patient is a 22 year old female with left side shoulder complaints.    Patient has the following significant findings with corresponding treatment plan.                Diagnosis 1:  L shoulder anterior dislocation    Pain -  hot/cold therapy, manual therapy, self management, education and home program  Decreased ROM/flexibility - manual therapy, therapeutic exercise, therapeutic activity and home program  Decreased strength - therapeutic exercise, therapeutic activities and home program  Decreased function - therapeutic activities and home program    Therapy Evaluation Codes:   1) History comprised of:   Personal factors that impact the plan of care:      Anxiety.    Comorbidity factors that impact the plan of care are:      None.     Medications impacting care: Anti-depressant.  2) Examination of Body Systems comprised of:   Body structures and functions that impact the plan of care:      Shoulder.   Activity limitations that impact the plan of care are:      Bathing, Cooking, Driving, Dressing, Lifting, Throwing, Working and Sleeping.  3) Clinical presentation characteristics are:   Stable/Uncomplicated.  4) Decision-Making    Low complexity using standardized  patient assessment instrument and/or measureable assessment of functional outcome.  Cumulative Therapy Evaluation is: Low complexity.    Previous and current functional limitations:  (See Goal Flow Sheet for this information)    Short term and Long term goals: (See Goal Flow Sheet for this information)     Communication ability:  Patient appears to be able to clearly communicate and understand verbal and written communication and follow directions correctly.  Treatment Explanation - The following has been discussed with the patient:   RX ordered/plan of care  Anticipated outcomes  Possible risks and side effects  This patient would benefit from PT intervention to resume normal activities.   Rehab potential is good.    Frequency:  1 X week, once daily  Duration:  for 10 weeks  Discharge Plan:  Achieve all LTG.  Independent in home treatment program.  Reach maximal therapeutic benefit.    Please refer to the daily flowsheet for treatment today, total treatment time and time spent performing 1:1 timed codes.     Elvis Arthur,PT, DPT, OCS

## 2023-02-15 ENCOUNTER — E-VISIT (OUTPATIENT)
Dept: FAMILY MEDICINE | Facility: OTHER | Age: 23
End: 2023-02-15
Payer: COMMERCIAL

## 2023-02-15 ENCOUNTER — MYC MEDICAL ADVICE (OUTPATIENT)
Dept: FAMILY MEDICINE | Facility: OTHER | Age: 23
End: 2023-02-15
Payer: COMMERCIAL

## 2023-02-15 DIAGNOSIS — N76.0 BACTERIAL VAGINITIS: ICD-10-CM

## 2023-02-15 DIAGNOSIS — B96.89 BACTERIAL VAGINITIS: ICD-10-CM

## 2023-02-15 DIAGNOSIS — N89.8 VAGINAL DISCHARGE: Primary | ICD-10-CM

## 2023-02-15 PROCEDURE — 99207 PR NON-BILLABLE SERV PER CHARTING: CPT | Performed by: PHYSICIAN ASSISTANT

## 2023-02-16 ENCOUNTER — THERAPY VISIT (OUTPATIENT)
Dept: PHYSICAL THERAPY | Facility: CLINIC | Age: 23
End: 2023-02-16
Payer: COMMERCIAL

## 2023-02-16 DIAGNOSIS — S43.005D SHOULDER DISLOCATION, LEFT, SUBSEQUENT ENCOUNTER: ICD-10-CM

## 2023-02-16 DIAGNOSIS — M25.512 ACUTE PAIN OF LEFT SHOULDER: Primary | ICD-10-CM

## 2023-02-16 PROCEDURE — 97110 THERAPEUTIC EXERCISES: CPT | Mod: GP | Performed by: PHYSICAL THERAPIST

## 2023-02-16 PROCEDURE — 97112 NEUROMUSCULAR REEDUCATION: CPT | Mod: GP | Performed by: PHYSICAL THERAPIST

## 2023-02-17 ENCOUNTER — LAB (OUTPATIENT)
Dept: LAB | Facility: OTHER | Age: 23
End: 2023-02-17
Payer: COMMERCIAL

## 2023-02-17 DIAGNOSIS — N89.8 VAGINAL DISCHARGE: ICD-10-CM

## 2023-02-17 DIAGNOSIS — A74.9 CHLAMYDIA INFECTION: ICD-10-CM

## 2023-02-17 LAB
CLUE CELLS: PRESENT
TRICHOMONAS, WET PREP: ABNORMAL
WBC'S/HIGH POWER FIELD, WET PREP: ABNORMAL
YEAST, WET PREP: ABNORMAL

## 2023-02-17 PROCEDURE — 87210 SMEAR WET MOUNT SALINE/INK: CPT

## 2023-02-17 PROCEDURE — 87491 CHLMYD TRACH DNA AMP PROBE: CPT

## 2023-02-18 LAB — C TRACH DNA SPEC QL NAA+PROBE: NEGATIVE

## 2023-02-19 RX ORDER — METRONIDAZOLE 500 MG/1
500 TABLET ORAL 2 TIMES DAILY
Qty: 14 TABLET | Refills: 0 | Status: SHIPPED | OUTPATIENT
Start: 2023-02-19 | End: 2023-02-26

## 2023-02-23 ENCOUNTER — THERAPY VISIT (OUTPATIENT)
Dept: PHYSICAL THERAPY | Facility: CLINIC | Age: 23
End: 2023-02-23
Payer: COMMERCIAL

## 2023-02-23 DIAGNOSIS — M25.512 ACUTE PAIN OF LEFT SHOULDER: Primary | ICD-10-CM

## 2023-02-23 DIAGNOSIS — S43.005D SHOULDER DISLOCATION, LEFT, SUBSEQUENT ENCOUNTER: ICD-10-CM

## 2023-02-23 PROCEDURE — 97112 NEUROMUSCULAR REEDUCATION: CPT | Mod: GP | Performed by: PHYSICAL THERAPIST

## 2023-02-23 PROCEDURE — 97110 THERAPEUTIC EXERCISES: CPT | Mod: GP | Performed by: PHYSICAL THERAPIST

## 2023-03-02 ENCOUNTER — THERAPY VISIT (OUTPATIENT)
Dept: PHYSICAL THERAPY | Facility: CLINIC | Age: 23
End: 2023-03-02
Payer: COMMERCIAL

## 2023-03-02 DIAGNOSIS — S43.005D SHOULDER DISLOCATION, LEFT, SUBSEQUENT ENCOUNTER: ICD-10-CM

## 2023-03-02 DIAGNOSIS — M25.512 ACUTE PAIN OF LEFT SHOULDER: Primary | ICD-10-CM

## 2023-03-02 PROCEDURE — 97112 NEUROMUSCULAR REEDUCATION: CPT | Mod: GP | Performed by: PHYSICAL THERAPIST

## 2023-03-02 PROCEDURE — 97110 THERAPEUTIC EXERCISES: CPT | Mod: GP | Performed by: PHYSICAL THERAPIST

## 2023-03-09 ENCOUNTER — THERAPY VISIT (OUTPATIENT)
Dept: PHYSICAL THERAPY | Facility: CLINIC | Age: 23
End: 2023-03-09
Payer: COMMERCIAL

## 2023-03-09 DIAGNOSIS — M25.512 ACUTE PAIN OF LEFT SHOULDER: Primary | ICD-10-CM

## 2023-03-09 DIAGNOSIS — S43.005D SHOULDER DISLOCATION, LEFT, SUBSEQUENT ENCOUNTER: ICD-10-CM

## 2023-03-09 PROCEDURE — 97110 THERAPEUTIC EXERCISES: CPT | Mod: GP | Performed by: PHYSICAL THERAPIST

## 2023-03-09 PROCEDURE — 97112 NEUROMUSCULAR REEDUCATION: CPT | Mod: GP | Performed by: PHYSICAL THERAPIST

## 2023-03-13 ENCOUNTER — THERAPY VISIT (OUTPATIENT)
Dept: PHYSICAL THERAPY | Facility: CLINIC | Age: 23
End: 2023-03-13
Payer: COMMERCIAL

## 2023-03-13 DIAGNOSIS — M25.512 ACUTE PAIN OF LEFT SHOULDER: Primary | ICD-10-CM

## 2023-03-13 DIAGNOSIS — S43.005D SHOULDER DISLOCATION, LEFT, SUBSEQUENT ENCOUNTER: ICD-10-CM

## 2023-03-13 PROCEDURE — 97110 THERAPEUTIC EXERCISES: CPT | Mod: GP | Performed by: PHYSICAL THERAPIST

## 2023-03-23 ENCOUNTER — THERAPY VISIT (OUTPATIENT)
Dept: PHYSICAL THERAPY | Facility: CLINIC | Age: 23
End: 2023-03-23
Payer: COMMERCIAL

## 2023-03-23 DIAGNOSIS — M25.512 ACUTE PAIN OF LEFT SHOULDER: Primary | ICD-10-CM

## 2023-03-23 DIAGNOSIS — S43.005D SHOULDER DISLOCATION, LEFT, SUBSEQUENT ENCOUNTER: ICD-10-CM

## 2023-03-23 PROCEDURE — 97110 THERAPEUTIC EXERCISES: CPT | Mod: GP | Performed by: PHYSICAL THERAPIST

## 2023-03-23 PROCEDURE — 97112 NEUROMUSCULAR REEDUCATION: CPT | Mod: GP | Performed by: PHYSICAL THERAPIST

## 2023-03-27 ENCOUNTER — THERAPY VISIT (OUTPATIENT)
Dept: PHYSICAL THERAPY | Facility: CLINIC | Age: 23
End: 2023-03-27
Payer: COMMERCIAL

## 2023-03-27 DIAGNOSIS — M25.512 ACUTE PAIN OF LEFT SHOULDER: Primary | ICD-10-CM

## 2023-03-27 DIAGNOSIS — S43.005D SHOULDER DISLOCATION, LEFT, SUBSEQUENT ENCOUNTER: ICD-10-CM

## 2023-03-27 PROCEDURE — 97110 THERAPEUTIC EXERCISES: CPT | Mod: GP | Performed by: PHYSICAL THERAPIST

## 2023-03-27 PROCEDURE — 97112 NEUROMUSCULAR REEDUCATION: CPT | Mod: GP | Performed by: PHYSICAL THERAPIST

## 2023-04-10 ENCOUNTER — THERAPY VISIT (OUTPATIENT)
Dept: PHYSICAL THERAPY | Facility: CLINIC | Age: 23
End: 2023-04-10
Payer: COMMERCIAL

## 2023-04-10 DIAGNOSIS — M25.512 ACUTE PAIN OF LEFT SHOULDER: Primary | ICD-10-CM

## 2023-04-10 DIAGNOSIS — S43.005D SHOULDER DISLOCATION, LEFT, SUBSEQUENT ENCOUNTER: ICD-10-CM

## 2023-04-10 PROCEDURE — 97112 NEUROMUSCULAR REEDUCATION: CPT | Mod: GP | Performed by: PHYSICAL THERAPIST

## 2023-04-10 PROCEDURE — 97110 THERAPEUTIC EXERCISES: CPT | Mod: GP | Performed by: PHYSICAL THERAPIST

## 2023-04-10 NOTE — PROGRESS NOTES
Subjective:  HPI  Physical Exam                    Objective:  System    Physical Exam    General     ROS    Assessment/Plan:    PROGRESS  REPORT    Progress reporting period is from 2/9/23 to 4/10/23.       SUBJECTIVE  Subjective changes noted by patient:  she is doing well, motion is getting better, she is working ~15 hrs/week, still gets tight with sleeping at night, needs to give herself time to loosen up her shoulder   Current Pain level: 0/10 (worst 4/10).     Previous pain level was  NA Initial Pain level: 10/10.   Changes in function:  Yes (See Goal flowsheet attached for changes in current functional level)  Adverse reaction to treatment or activity: None    OBJECTIVE  Changes noted in objective findings:    Objective: PROM: L shoulder flex 165, abd 160, ER(90) 80, IR(90) 45, AROM: shoulder flex 150, abd 150, ER(0) 60, IR(0) T12, MMT: shoulder abd 4+/5, ER4/5, IR 4/5, mild hypertonicity L subscap, mild + apprehension L, + relocation L,     ASSESSMENT/PLAN  Updated problem list and treatment plan: Diagnosis 1:  L shoulder anterior dislocation    Pain -  hot/cold therapy, manual therapy, self management, education and home program  Decreased ROM/flexibility - manual therapy, therapeutic exercise, therapeutic activity and home program  Decreased strength - therapeutic exercise, therapeutic activities and home program  Decreased function - therapeutic activities and home program  STG/LTGs have been met or progress has been made towards goals:  Yes (See Goal flow sheet completed today.)  Assessment of Progress: The patient's condition is improving.  Patient is meeting short term goals and is progressing towards long term goals.  Self Management Plans:  Patient has been instructed in a home treatment program.  Patient  has been instructed in self management of symptoms.  I have re-evaluated this patient and find that the nature, scope, duration and intensity of the therapy is appropriate for the medical condition  of the patient.  Cristel continues to require the following intervention to meet STG and LTG's:  PT    Recommendations:  This patient would benefit from continued therapy.     Frequency:  1 X week, once daily  Duration:  for 6 weeks        Please refer to the daily flowsheet for treatment today, total treatment time and time spent performing 1:1 timed codes.    Elvis Arthur,PT, DPT, OCS

## 2023-04-11 ENCOUNTER — OFFICE VISIT (OUTPATIENT)
Dept: ORTHOPEDICS | Facility: CLINIC | Age: 23
End: 2023-04-11
Payer: COMMERCIAL

## 2023-04-11 VITALS
BODY MASS INDEX: 23.04 KG/M2 | DIASTOLIC BLOOD PRESSURE: 68 MMHG | SYSTOLIC BLOOD PRESSURE: 100 MMHG | WEIGHT: 130 LBS | HEIGHT: 63 IN

## 2023-04-11 DIAGNOSIS — S43.005A SHOULDER DISLOCATION, LEFT, INITIAL ENCOUNTER: Primary | ICD-10-CM

## 2023-04-11 PROCEDURE — 99213 OFFICE O/P EST LOW 20 MIN: CPT | Performed by: PHYSICIAN ASSISTANT

## 2023-04-11 ASSESSMENT — PAIN SCALES - GENERAL: PAINLEVEL: NO PAIN (0)

## 2023-04-11 NOTE — LETTER
April 11, 2023      Cristel Grissom  [address] [facility] [address]        To Whom It May Concern:    Cristel Grissom was seen in our clinic. She may return to work with the following: no lifting greater than 5lbs above shoulder with Left upper extremity.    Next appointment is 6 weeks.       Sincerely,        Daniel Medina PA-C

## 2023-04-11 NOTE — Clinical Note
4/11/2023         RE: Cristel Grissom  [address] [facility] [address]        Dear Colleague,    Thank you for referring your patient, Cristel Grissom, to the [facility]. Please see a copy of my visit note below.    Office Visit-Follow up    Chief Complaint: Cristel Grissom is a 22 year old female who is being seen for   Chief Complaint   Patient presents with     RECHECK      left shoulder anterior dislocation, first-time.- DOI; 1/14/2023       History of Present Illness:   Mechanism of Injury: ***  Location: ***  Duration of Pain: ***  Rating of Pain: ***  Pain Quality: ***  Pain is better with: ***  Pain is worse with: ***  Treatment so far consists of: Patient was last seen on 2/7/2023 by myself and at that time patient was to continue range of motion and Codman's and start some passive range of motion under the guidance of therapy she was set to advance to active assisted and strengthening when she is 6 weeks out from her dislocation.  With work we allowed her to continue using the sling and shoulder immobilizer as she chooses for 2 weeks and also she cannot push pull lift greater than 1 pound to waist level with the left lower extremity for the next 2 months.***  Associated Features: ***  Pain is Limiting: ***  Here to: ***  Additional History: ***    REVIEW OF SYSTEMS  General: negative for, night sweats, dizziness, fatigue  Resp: No shortness of breath and no cough  CV: negative for chest pain, syncope or near-syncope  GI: negative for nausea, vomiting and diarrhea  : negative for dysuria and hematuria  Musculoskeletal: as above  Neurologic: negative for syncope   Hematologic: negative for bleeding disorder    Physical Exam:  Vitals: There were no vitals taken for this visit.  BMI= There is no height or weight on file to calculate BMI.  Constitutional: healthy, alert and no acute distress   Psychiatric: mentation appears normal and affect normal/bright  NEURO: no focal  deficits, CMS intact ***  RESP: Normal with easy respirations and no use of accessory muscles to breathe, no audible wheezing or retractions  CV: ***  SKIN: No erythema, rashes, excoriation, or breakdown. No evidence of infection.***   MUSCULOSKELETAL:    INSPECTION of ***: No gross deformities, erythema, edema, ecchymosis, atrophy or fasciculations.     PALPATION: ***    ROM: ***     STRENGTH: ***    SPECIAL TEST: ***  GAIT: non-antalgic***  Lymph: no palpable lymph nodes      Diagnostic Modalities:  No radiographs necessary today      Impression: 1.      2 months 28 days status post left shoulder anterior dislocation, first-time.  2.      2 months 28-days status post left shoulder conscious sedation and reduction in the emergency department.    Plan:  All of the above pertinent physical exam and imaging modalities findings was reviewed with Cristel***.    {LORGE PLAN OPTIONS VERSION 2:124036}    FOCUSED PLAN:   ***  Re-x-ray on return: {MALOU YES NO NOT APPLICABLE:627967}      This note was dictated with [employer].    Daniel Medina PA-C                Again, thank you for allowing me to participate in the care of your patient.        Sincerely,        Daniel Medina PA-C

## 2023-04-11 NOTE — PROGRESS NOTES
"Office Visit-Follow up    Chief Complaint: Cristel Grissom is a 22 year old female who is being seen for   Chief Complaint   Patient presents with     RECHECK      left shoulder anterior dislocation, first-time.- DOI; 1/14/2023       History of Present Illness:   Location: Left shoulder  Duration of Pain: Since date of injury although no pain now.  Rating of Pain: 0 out of 10  Pain Quality: No pain  Pain is better with: No pain  Pain is worse with: No pain  Treatment so far consists of: Patient was last seen on 2/7/2023 by myself and at that time patient was to continue range of motion and Codman's and start some passive range of motion under the guidance of therapy she was set to advance to active assisted and strengthening when she is 6 weeks out from her dislocation.  With work we allowed her to continue using the sling and shoulder immobilizer as she chooses for 2 weeks and also she cannot push pull lift greater than 1 pound to waist level with the left lower extremity for the next 2 months.  Patient has done well with work and that is not having problems there.  She does feel that she has made good progress in physical therapy and she is less anxious about the shoulder.  She can now drive where before she could not even drive.  Associated Features: Denies numbness or tingling shooting burning electric pain.  Pain is Limiting: Heavy use of left shoulder  Here to: Orthopedic follow-up  Additional History: Patient does admit that she is a bit nervous to do certain movements.    REVIEW OF SYSTEMS  General: negative for, night sweats, dizziness, fatigue  Resp: No shortness of breath and no cough  CV: negative for chest pain, syncope or near-syncope  GI: negative for nausea, vomiting and diarrhea  : negative for dysuria and hematuria  Musculoskeletal: as above  Neurologic: negative for syncope   Hematologic: negative for bleeding disorder    Physical Exam:  Vitals: /68   Ht 1.6 m (5' 3\")   Wt 59 kg (130 " lb)   BMI 23.03 kg/m    BMI= Body mass index is 23.03 kg/m .  Constitutional: healthy, alert and no acute distress   Psychiatric: mentation appears normal and affect normal/bright  NEURO: no focal deficits, CMS intact left upper extremity   RESP: Normal with easy respirations and no use of accessory muscles to breathe, no audible wheezing or retractions  CV: +2 radial pulse and her hand is warm to palpation.   SKIN: No erythema, rashes, excoriation, or breakdown. No evidence of infection.   MUSCULOSKELETAL:    INSPECTION of left shoulder: No gross deformities, erythema, edema, ecchymosis, atrophy or fasciculations.     PALPATION: No tenderness AC joint, proximal biceps tendon, clavicle, lateral shoulder, posterior shoulder, trapezius area. No increased warmth noted.     ROM: Forward flexion to 170 compared to the contralateral side of 180 , abduction to 170 compared to contralateral side of 180 , external rotation to 45 compared to contralateral side of 105 degrees, internal rotation to lumbar spine compared to contralateral side at thoracic spine.  All range of motion is without catching, locking or pain.      STRENGTH: 5 out of 5 , deltoid as well as internal and external rotation. 5 out of 5 supraspinatus, infraspinatus and subscapularis.      SPECIAL TEST: Patient has a negative Neer test, negative Urbano sign, negative cross over test, negative bear hug test and negative liftoff test, negative empty can and full can test, negative external rotator lag sign, negative O'aristeo's test, negative apprehension test  GAIT: non-antalgic  Lymph: no palpable lymph nodes      Diagnostic Modalities:  No radiographs necessary today      Impression: 1.      2 months 28 days status post left shoulder anterior dislocation, first-time.  2.      2 months 28-days status post left shoulder conscious sedation and reduction in the emergency department.    Plan:  All of the above pertinent physical exam and imaging modalities  findings was reviewed with Cristel.    FOCUSED PLAN:   22-year-old female who is almost 3 months status post left shoulder anterior dislocation and emergency department reduction.  Patient is definitely doing way better and she is happy with her physical therapy.  She has noticed she can do things that she was not able to do previously.  She is not having any trouble at work and is using her restrictions.  I do feel she still has some nervousness about her shoulder and is still in the process of gaining confidence.  She has good strength but lacks some range of motion especially external rotation.  I will have her continue physical therapy working on endurance external rotation and gaining confidence over the next 6 weeks and increase her restrictions to no lifting greater than 5 pounds above shoulder level only with the left upper extremity for the next 6 weeks and we will follow-up in 6 weeks and at that point I assume she will be ready to return to work without restrictions and have more confidence.  Follow-up in 6 weeks.    Re-x-ray on return: No      This note was dictated with [employer].    Daniel Medina PA-C

## 2023-04-13 ENCOUNTER — OFFICE VISIT (OUTPATIENT)
Dept: DERMATOLOGY | Facility: CLINIC | Age: 23
End: 2023-04-13
Attending: FAMILY MEDICINE
Payer: COMMERCIAL

## 2023-04-13 DIAGNOSIS — L70.0 ACNE VULGARIS: ICD-10-CM

## 2023-04-13 DIAGNOSIS — B07.8 VERRUCA PLANA: Primary | ICD-10-CM

## 2023-04-13 PROCEDURE — 99213 OFFICE O/P EST LOW 20 MIN: CPT | Mod: 25 | Performed by: PHYSICIAN ASSISTANT

## 2023-04-13 PROCEDURE — 17110 DESTRUCTION B9 LES UP TO 14: CPT | Performed by: PHYSICIAN ASSISTANT

## 2023-04-13 ASSESSMENT — PAIN SCALES - GENERAL: PAINLEVEL: MILD PAIN (2)

## 2023-04-13 NOTE — NURSING NOTE
Cristel Grissom's chief complaint for this visit includes:  Chief Complaint   Patient presents with     Derm Problem     Patient was diagnosed a couple months ago with genital warts. Patient is not sure what she should be doing. Here for a consult.      PCP: Erasmo Ricketts    Referring Provider:  Kala Chavez MD  [address] [place] [id number]    [facility] 03/18/2023 (Exact Date)   Mild Pain (2)        Allergies   Allergen Reactions     Bactrim [Sulfamethoxazole W/Trimethoprim]      When very young, vomiting likely per mom     Cefzil [Cefprozil]      When very young, vomiting likely per mom     Penicillins      When very young, vomiting likely per mom     Sulfa Drugs      When very young, vomiting likely per mom         Do you need any medication refills at today's visit? Refills good until Aug.     Leti Portillo MA

## 2023-04-13 NOTE — PROGRESS NOTES
HPI:   Chief complaints: Cristel Grissom is a pleasant 22 year old female who presents for evaluation of genital warts. She was recently seen by her PCP and on exam was found to have genital warts. She can not feel any abnormalities so is not sure where they are. She has not tried any treatments as of yet.   She would also like to recheck her acne - she reports things are overall going well and she is seeing improvement. She is using tretinoin daily and clindamycin intermittently. She is pleased with results thus far. She does not do well swallowing pills and would still like to avoid any PO medications.       PHYSICAL EXAM:    LMP 03/18/2023 (Exact Date)   Skin exam performed as follows: Type 2 skin. Mood appropriate  Alert and Oriented X 3. Well developed, well nourished in no distress.  General appearance: Normal  Head including face: Normal  Eyes: conjunctiva and lids: Normal  Mouth: Lips, teeth, gums: Normal  Neck: Normal  Skin: Scalp and body hair: See below    Vulva, labia majora, labia minora examined  Tan dome shaped papule on the left lateral hypothenar eminence, right volar 5th digit   1+ inflammatory papules and 1+ comedones on the face    ASSESSMENT/PLAN:     1. No warts visible today - will continue to monitor.   2. Verruca plana - Cryosurgery performed. Advised on blistering and post op care.   3. Acne vulgaris - happy with current regimen    --Continue OTC BPO wash every day in the shower  --Continue clindamycin gel QAM  --Continue tretinoin 0.1% cream daily. Discussed potential for dryness/irritation. Advised to use emollients if needed.      Follow-up: 6 mo  CC:   Scribed By: Kadi Smith, MS, PATODD

## 2023-04-13 NOTE — LETTER
4/13/2023         RE: Cristel Grissom  [address] [facility] [address]        Dear Colleague,    Thank you for referring your patient, Cristel Grissom, to the [facility]. Please see a copy of my visit note below.    HPI:   Chief complaints: Cristel Grissom is a pleasant 22 year old female who presents for evaluation of genital warts. She was recently seen by her PCP and on exam was found to have genital warts. She can not feel any abnormalities so is not sure where they are. She has not tried any treatments as of yet.   She would also like to recheck her acne - she reports things are overall going well and she is seeing improvement. She is using tretinoin daily and clindamycin intermittently. She is pleased with results thus far. She does not do well swallowing pills and would still like to avoid any PO medications.       PHYSICAL EXAM:    LMP 03/18/2023 (Exact Date)   Skin exam performed as follows: Type 2 skin. Mood appropriate  Alert and Oriented X 3. Well developed, well nourished in no distress.  General appearance: Normal  Head including face: Normal  Eyes: conjunctiva and lids: Normal  Mouth: Lips, teeth, gums: Normal  Neck: Normal  Skin: Scalp and body hair: See below    Vulva, labia majora, labia minora examined  Tan dome shaped papule on the left lateral hypothenar eminence, right volar 5th digit   1+ inflammatory papules and 1+ comedones on the face    ASSESSMENT/PLAN:     1. No warts visible today - will continue to monitor.   2. Verruca plana - Cryosurgery performed. Advised on blistering and post op care.   3. Acne vulgaris - happy with current regimen    --Continue OTC BPO wash every day in the shower  --Continue clindamycin gel QAM  --Continue tretinoin 0.1% cream daily. Discussed potential for dryness/irritation. Advised to use emollients if needed.      Follow-up: 6 mo  CC:   Scribed By: Kadi Smith, MS, PA-C          Again, thank you for allowing me to  participate in the care of your patient.        Sincerely,        Kadi Harrell PA-C

## 2023-04-18 ENCOUNTER — THERAPY VISIT (OUTPATIENT)
Dept: PHYSICAL THERAPY | Facility: CLINIC | Age: 23
End: 2023-04-18
Payer: COMMERCIAL

## 2023-04-18 DIAGNOSIS — S43.005D SHOULDER DISLOCATION, LEFT, SUBSEQUENT ENCOUNTER: ICD-10-CM

## 2023-04-18 DIAGNOSIS — M25.512 ACUTE PAIN OF LEFT SHOULDER: Primary | ICD-10-CM

## 2023-04-18 PROCEDURE — 97112 NEUROMUSCULAR REEDUCATION: CPT | Mod: GP | Performed by: PHYSICAL THERAPIST

## 2023-04-18 PROCEDURE — 97110 THERAPEUTIC EXERCISES: CPT | Mod: GP | Performed by: PHYSICAL THERAPIST

## 2023-04-28 ENCOUNTER — OFFICE VISIT (OUTPATIENT)
Dept: OBGYN | Facility: CLINIC | Age: 23
End: 2023-04-28
Payer: COMMERCIAL

## 2023-04-28 VITALS
DIASTOLIC BLOOD PRESSURE: 66 MMHG | BODY MASS INDEX: 22.44 KG/M2 | OXYGEN SATURATION: 100 % | HEART RATE: 91 BPM | WEIGHT: 126.7 LBS | SYSTOLIC BLOOD PRESSURE: 106 MMHG

## 2023-04-28 DIAGNOSIS — N89.8 VAGINAL DISCHARGE: Primary | ICD-10-CM

## 2023-04-28 DIAGNOSIS — R10.2 VAGINAL PAIN: ICD-10-CM

## 2023-04-28 DIAGNOSIS — N89.8 PRURITUS OF VAGINA: ICD-10-CM

## 2023-04-28 LAB
C TRACH DNA SPEC QL NAA+PROBE: NEGATIVE
CLUE CELLS: ABNORMAL
N GONORRHOEA DNA SPEC QL NAA+PROBE: NEGATIVE
TRICHOMONAS, WET PREP: ABNORMAL
WBC'S/HIGH POWER FIELD, WET PREP: ABNORMAL
YEAST, WET PREP: ABNORMAL

## 2023-04-28 PROCEDURE — 87491 CHLMYD TRACH DNA AMP PROBE: CPT

## 2023-04-28 PROCEDURE — 87210 SMEAR WET MOUNT SALINE/INK: CPT

## 2023-04-28 PROCEDURE — 87591 N.GONORRHOEAE DNA AMP PROB: CPT

## 2023-04-28 PROCEDURE — 99213 OFFICE O/P EST LOW 20 MIN: CPT

## 2023-04-28 ASSESSMENT — PATIENT HEALTH QUESTIONNAIRE - PHQ9
SUM OF ALL RESPONSES TO PHQ QUESTIONS 1-9: 10
10. IF YOU CHECKED OFF ANY PROBLEMS, HOW DIFFICULT HAVE THESE PROBLEMS MADE IT FOR YOU TO DO YOUR WORK, TAKE CARE OF THINGS AT HOME, OR GET ALONG WITH OTHER PEOPLE: SOMEWHAT DIFFICULT
SUM OF ALL RESPONSES TO PHQ QUESTIONS 1-9: 10

## 2023-04-28 NOTE — PATIENT INSTRUCTIONS
If you have any questions regarding your visit, Please contact your care team.     [employer] [facility]: [phone]  To Schedule an Appointment 24/7  Call: [phone]LEPHONE [facility]   Navya Glover, AGUILA, APRN, NP-BC    Amber -Surgery Scheduler  Estephania - Surgery Scheduler    Maria D RN  Mamta, RN  [place], ESTEBAN        [facility]  [address] [place] [address] [phone] Phone  [phone] Fax    Imaging Scheduling-All Locations [phone]    [facility]  [address] [place] [address]     Urgent Care locations:  [facility] Monday-Friday   10 am - 8 pm  Saturday and Sunday   9 am - 5 pm [phone]   [facility]:  [phone]    If you need a medication refill, please contact your pharmacy. Please allow 3 business days for your refill to be completed.  As always, Thank you for trusting us with your healthcare needs!  see additional instructions from your care team below

## 2023-04-28 NOTE — PROGRESS NOTES
"  Assessment & Plan     Vaginal pain  Vaginal discharge  Pruritus of vagina  - Wet prep - Clinic Collect  - NEISSERIA GONORRHOEA PCR  - CHLAMYDIA TRACHOMATIS PCR      ASHELY Flores CNP  [facility] [place]    Total of 30 minutes spent on the date of the encounter doing chart review, history and exam, documentation, patient visit and exam, and further activities as noted above.    ASHELY Flores CNP    Subjective   Cristel is a 22 year old, presenting for the following health issues:  Vaginal dryness, soreness possibly from her birth control method (TIESHA)    HPI     Patient presents today for evaluation of vaginal soreness, \"inside\" the vaginal canal, and also pelvic soreness \"more in the center, no in a specific place.\"   She reports creamy thicker vaginal discharge.  Questionable vulvar itching.  Denies oddor change.  LMP was 4/17/2023.  Symptoms stared about 1 week before onset of menses.  Symptoms were maybe slightly better during the period but as the bleeding ended her symptoms returned.  Last sexual activity December 2022.  Patient reports positive chlamydia in 1/9/2023. Treated and retested with Negative. Partner was treated.  Patient has not been sexually active since prior to chlamydia treatment.  Patient was also recently treated for yeast infection 1/04/2023 and for BV 2/19/2023.  She feels her symptoms of each iimproved after treatment but she feels like she has had changes to her vaginal environment after beginning the TIESHA including changes to her discharge, vaginal soreness, and possibly vaginal dryness.  Patient denies CVA tenderness, dysuria.  Patient denies diarrhea or constipation changes.  Denies fever, N/V.  She is currently using TIESHA, started in 11/2022.    Last PAP: 6/16/2021  Next PAP due: 6/2024  History of abnormal Pap smear: Denies    Review of Systems 10 point review of systems completed with no abnormals except as noted in HPI.      Objective  "     /66 (BP Location: Right arm, Patient Position: Sitting, Cuff Size: Adult Regular)   Pulse 91   Wt 57.5 kg (126 lb 11.2 oz)   LMP 04/17/2023 (Exact Date)   SpO2 100%   BMI 22.44 kg/m      Physical Exam   GENERAL: healthy, alert and no distress  RESP: respirations are easy even and unlabored  ABDOMEN: soft, nontender, no hepatosplenomegaly, no masses and bowel sounds normal   (female): normal female external genitalia, normal urethral meatus, vaginal mucosa is pink, moist and well ruggated POSITIVE for small amount of thick, clumpy white discharge noted clinging to vaginal walls and at the vaginal introitus, normal cervix/adnexa/uterus without masses or lesion. Negative for CMT.  MS: no gross musculoskeletal defects noted, no edema  SKIN: no suspicious lesions or rashes  NEURO: Normal strength and tone, mentation intact and speech normal  PSYCH: mentation appears normal, affect normal/bright  LYMPH: no cervical, supraclavicular, axillary, or inguinal adenopathy        Answers for HPI/ROS submitted by the patient on 4/28/2023  If you checked off any problems, how difficult have these problems made it for you to do your work, take care of things at home, or get along with other people?: Somewhat difficult  PHQ9 TOTAL SCORE: 10

## 2023-04-29 DIAGNOSIS — B37.31 CANDIDAL VULVOVAGINITIS: Primary | ICD-10-CM

## 2023-04-29 RX ORDER — FLUCONAZOLE 150 MG/1
150 TABLET ORAL ONCE
Qty: 1 TABLET | Refills: 0 | Status: SHIPPED | OUTPATIENT
Start: 2023-04-29 | End: 2023-04-29

## 2023-04-29 RX ORDER — NYSTATIN AND TRIAMCINOLONE ACETONIDE 100000; 1 [USP'U]/G; MG/G
CREAM TOPICAL 2 TIMES DAILY
Qty: 30 G | Refills: 0 | Status: SHIPPED | OUTPATIENT
Start: 2023-04-29 | End: 2023-05-02

## 2023-05-01 ENCOUNTER — THERAPY VISIT (OUTPATIENT)
Dept: PHYSICAL THERAPY | Facility: CLINIC | Age: 23
End: 2023-05-01
Payer: COMMERCIAL

## 2023-05-01 DIAGNOSIS — S43.005D SHOULDER DISLOCATION, LEFT, SUBSEQUENT ENCOUNTER: ICD-10-CM

## 2023-05-01 DIAGNOSIS — M25.512 ACUTE PAIN OF LEFT SHOULDER: Primary | ICD-10-CM

## 2023-05-01 PROCEDURE — 97110 THERAPEUTIC EXERCISES: CPT | Mod: GP | Performed by: PHYSICAL THERAPIST

## 2023-05-01 PROCEDURE — 97112 NEUROMUSCULAR REEDUCATION: CPT | Mod: GP | Performed by: PHYSICAL THERAPIST

## 2023-05-04 ENCOUNTER — MYC MEDICAL ADVICE (OUTPATIENT)
Dept: OBGYN | Facility: CLINIC | Age: 23
End: 2023-05-04

## 2023-05-04 DIAGNOSIS — N89.8 VAGINAL DISCHARGE: Primary | ICD-10-CM

## 2023-05-08 RX ORDER — FLUCONAZOLE 150 MG/1
TABLET ORAL
Qty: 2 TABLET | Refills: 0 | Status: SHIPPED | OUTPATIENT
Start: 2023-05-08 | End: 2024-01-26

## 2023-05-08 NOTE — TELEPHONE ENCOUNTER
Pt was seen by Navya Quintana, ASHELY YORK, on 4/28/23.  Pt's wet prep was negative for BV and yeast.  However, pt was prescribed Diflucan due to symptoms.    Pt took the Diflucan last Sunday.  Her symptoms started to improve but then returned again mid week.  She is experiencing vaginal itching, soreness, pelvic pain, burning with urination and discharge changes.    Routing to Navya to see if pt should do a self swab wet prep at the lab or be seen in clinic.    Maria D Choudhury RN

## 2023-05-11 ENCOUNTER — THERAPY VISIT (OUTPATIENT)
Dept: PHYSICAL THERAPY | Facility: CLINIC | Age: 23
End: 2023-05-11
Payer: COMMERCIAL

## 2023-05-11 DIAGNOSIS — M25.512 ACUTE PAIN OF LEFT SHOULDER: Primary | ICD-10-CM

## 2023-05-11 DIAGNOSIS — S43.005D SHOULDER DISLOCATION, LEFT, SUBSEQUENT ENCOUNTER: ICD-10-CM

## 2023-05-11 PROCEDURE — 97112 NEUROMUSCULAR REEDUCATION: CPT | Mod: GP | Performed by: PHYSICAL THERAPIST

## 2023-05-11 PROCEDURE — 97110 THERAPEUTIC EXERCISES: CPT | Mod: GP | Performed by: PHYSICAL THERAPIST

## 2023-05-16 ENCOUNTER — THERAPY VISIT (OUTPATIENT)
Dept: PHYSICAL THERAPY | Facility: CLINIC | Age: 23
End: 2023-05-16
Payer: COMMERCIAL

## 2023-05-16 DIAGNOSIS — S43.005D SHOULDER DISLOCATION, LEFT, SUBSEQUENT ENCOUNTER: ICD-10-CM

## 2023-05-16 DIAGNOSIS — M25.512 ACUTE PAIN OF LEFT SHOULDER: Primary | ICD-10-CM

## 2023-05-16 PROCEDURE — 97110 THERAPEUTIC EXERCISES: CPT | Mod: GP | Performed by: PHYSICAL THERAPIST

## 2023-05-16 PROCEDURE — 97530 THERAPEUTIC ACTIVITIES: CPT | Mod: GP | Performed by: PHYSICAL THERAPIST

## 2023-05-22 ENCOUNTER — THERAPY VISIT (OUTPATIENT)
Dept: PHYSICAL THERAPY | Facility: CLINIC | Age: 23
End: 2023-05-22
Payer: COMMERCIAL

## 2023-05-22 DIAGNOSIS — M25.512 ACUTE PAIN OF LEFT SHOULDER: Primary | ICD-10-CM

## 2023-05-22 DIAGNOSIS — S43.005D SHOULDER DISLOCATION, LEFT, SUBSEQUENT ENCOUNTER: ICD-10-CM

## 2023-05-22 PROCEDURE — 97110 THERAPEUTIC EXERCISES: CPT | Mod: GP | Performed by: PHYSICAL THERAPIST

## 2023-05-22 PROCEDURE — 97112 NEUROMUSCULAR REEDUCATION: CPT | Mod: GP | Performed by: PHYSICAL THERAPIST

## 2023-05-22 PROCEDURE — 97530 THERAPEUTIC ACTIVITIES: CPT | Mod: GP | Performed by: PHYSICAL THERAPIST

## 2023-05-22 NOTE — PROGRESS NOTES
DISCHARGE  Reason for Discharge: Patient has met all goals.    Equipment Issued: none    Discharge Plan: Patient to continue home program.    Referring Provider:  Erasmo Ricketts     05/22/23 0500   Appointment Info   Signing clinician's name / credentials Elvis Arthur DPT, OCS   Total/Authorized Visits 15   Visits Used 14   Medical Diagnosis L shoulder dislocation   PT Tx Diagnosis L shoulder dislocation   Precautions/Limitations none   Progress Note/Certification   Onset of illness/injury or Date of Surgery 01/14/23   Therapy Frequency 1x/week   Predicted Duration 5 weeks   Progress Note Completed Date 04/10/23       Present No   GOALS   PT Goals 2   PT Goal 1   Goal Description Lift an item overhead weighing level 2#, 10x, difficulty 2/10   Rationale to maximize safety and independence with performance of ADLs and functional tasks;to maximize safety and independence with self cares;to maximize safety and independence within the home;to maximize safety and independence within the community   Target Date 05/08/23   Date Met 05/11/23   PT Goal 2   Goal Description Lift an item overhead weighing 5#, 10x, difficulty 4/10   Rationale to maximize safety and independence with performance of ADLs and functional tasks;to maximize safety and independence within the home;to maximize safety and independence within the community;to maximize safety and independence with self cares   Goal Progress able to lift 5# overhead x 10, difficulty 3/10   Target Date 05/22/23   Date Met 05/22/23   Subjective Report   Subjective Report shoulder is doing well, she continues to use it more at work and home, still some occasional mild stiffness in shoulder with getting out of bed in the AM, pain 0/10   Objective Measures   Objective Measures Objective Measure 1   Objective Measure 1   Details AROM: shoulder flex 170, abd 163, ER(0) 70, IR (0) T10, floor<>counter: 10 rep max of 25#, counter<>shelf overhead 12# x 10,  carrying limit: 25# - limited by strength in hands, 15% loss L shoulder ext   Treatment Interventions (PT)   Interventions Therapeutic Activity   Therapeutic Procedure/Exercise   PTRx Ther Proc 1 - Details UBE: resistance 7, rpm 60, 5 min, arnold press 5# 3x 10, shoulder ER green TB 2x 15   PTRx Ther Proc 2 repeated shoulder ext L 2x 10   Therapeutic Activity   Ther Act 1 floor<>counter 20# x 10, 25# x 10   Ther Act 1 - Details counter to overhead: 10# x10, 12# x 10   Patient Response/Progress good lifting technique, limited by wrist/hand strength   Education   Learner/Method Patient;No Barriers to Learning   Plan   Home program continue with shoulder strengthening   Plan for next session d/c PT

## 2023-05-23 ENCOUNTER — OFFICE VISIT (OUTPATIENT)
Dept: ORTHOPEDICS | Facility: CLINIC | Age: 23
End: 2023-05-23
Payer: COMMERCIAL

## 2023-05-23 VITALS
WEIGHT: 124.5 LBS | DIASTOLIC BLOOD PRESSURE: 60 MMHG | HEIGHT: 63 IN | SYSTOLIC BLOOD PRESSURE: 104 MMHG | BODY MASS INDEX: 22.06 KG/M2

## 2023-05-23 DIAGNOSIS — S43.005A SHOULDER DISLOCATION, LEFT, INITIAL ENCOUNTER: Primary | ICD-10-CM

## 2023-05-23 PROCEDURE — 99213 OFFICE O/P EST LOW 20 MIN: CPT | Performed by: PHYSICIAN ASSISTANT

## 2023-05-23 ASSESSMENT — PAIN SCALES - GENERAL: PAINLEVEL: NO PAIN (0)

## 2023-05-23 NOTE — PROGRESS NOTES
"Office Visit-Follow up    Chief Complaint: Cristel Grissom is a 23 year old female who is being seen for   Chief Complaint   Patient presents with     RECHECK     left shoulder anterior dislocation, first-time.- DOI; 1/14/2023       History of Present Illness:   Location: Left shoulder  Duration of Pain:  since date of injury although no pain now.  Rating of Pain: 0 out of 10  Pain Quality: Stiffness  Pain is better with: Rest and therapy  Pain is worse with: Not doing therapy  Treatment so far consists of: Patient was last seen on 4/11/2023 and at that time she was still nervous although she had made some great progression and was working well with therapy.  She was still having trouble with external rotation.  She was ordered formal physical therapy to work on endurance and increasing external rotation.  We increased her restrictions to no lifting greater than 5 pounds above shoulder level only with upper left extremity for 6 weeks.  We assume that when she comes back in 6 weeks she would be able to return to work without restrictions.  Patient has been working with formal therapy and feels she is making progress.  She has a lot more confidence.  Associated Features: Denies numbness or tingling shooting burning or electric pain.  Pain is Limiting: Nothing  Here to: Orthopedic follow-up  Additional History: Patient is ready to go back to work without restrictions and feels therapy is helping but is ready to be done with therapy also.    REVIEW OF SYSTEMS  General: negative for, night sweats, dizziness, fatigue  Resp: No shortness of breath and no cough  CV: negative for chest pain, syncope or near-syncope  GI: negative for nausea, vomiting and diarrhea  : negative for dysuria and hematuria  Musculoskeletal: as above  Neurologic: negative for syncope   Hematologic: negative for bleeding disorder    Physical Exam:  Vitals: /60   Ht 1.6 m (5' 3\")   Wt 56.5 kg (124 lb 8 oz)   LMP 04/17/2023 (Exact Date)   " BMI 22.05 kg/m    BMI= Body mass index is 22.05 kg/m .  Constitutional: healthy, alert and no acute distress   Psychiatric: mentation appears normal and affect normal/bright  NEURO: no focal deficits, CMS intact left upper extremity  RESP: Normal with easy respirations and no use of accessory muscles to breathe, no audible wheezing or retractions  CV: +2 radial pulse and her hand is warm to palpation.   SKIN: No erythema, rashes, excoriation, or breakdown. No evidence of infection.   MUSCULOSKELETAL:    INSPECTION of left shoulder: No gross deformities, erythema, edema, ecchymosis, atrophy or fasciculations.     PALPATION: No tenderness AC joint, proximal biceps tendon, clavicle, lateral shoulder, posterior shoulder, trapezius area. No increased warmth noted.     ROM: Passive: Forward flexion to 175 degrees compared to 180 on the contralateral side, abduction to 175 degrees compared to 180 on the contralateral side, external rotation to 60  compared to 100 on the contralateral side, internal rotation to thoracic spine compared to upper thoracic spine on the contralateral side .  All range of motion is without catching, locking or pain       STRENGTH: 5 out of 5 , deltoid as well as internal and external rotation. 5 out of 5 supraspinatus, infraspinatus and subscapularis.      SPECIAL TEST: Patient has a negative cross over test, negative bear hug test and negative liftoff test, negative empty can and full can test, negative external rotator lag sign, negative apprehension test  GAIT: non-antalgic  Lymph: no palpable lymph nodes      Diagnostic Modalities:  No x-rays necessary today      Impression: 1.    4 months 9 days status post left shoulder anterior dislocation, first-time.  2.        4 months 9 days status post left shoulder conscious sedation and reduction in the emergency department.    Plan:  All of the above pertinent physical exam and imaging modalities findings was reviewed with Art MCCULLOUGH  PLAN:   23-year-old female who is just over 4 months out from left shoulder anterior dislocation with reduction.  Patient has made progress on her external rotation and her confidence.  Patient with negative apprehension test and stable shoulder today.  We removed restrictions and she can return to work without restrictions as of tomorrow.  Patient can wean off of formal therapy but I do want her to focus on external rotation as I feel she is still lacking in this slightly.  Patient can follow-up on an as-needed basis.    Re-x-ray on return: No      This note was dictated with [employer].    Daniel Medina PA-C

## 2023-05-23 NOTE — Clinical Note
5/23/2023         RE: Cristel Grissom  [address] [facility] [address]        Dear Colleague,    Thank you for referring your patient, Cristel Grissom, to the [facility]. Please see a copy of my visit note below.    Office Visit-Follow up    Chief Complaint: Cristel Grissom is a 23 year old female who is being seen for   Chief Complaint   Patient presents with     RECHECK     left shoulder anterior dislocation, first-time.- DOI; 1/14/2023       History of Present Illness:   Mechanism of Injury: ***  Location: ***  Duration of Pain: ***  Rating of Pain: ***  Pain Quality: ***  Pain is better with: ***  Pain is worse with: ***  Treatment so far consists of: Patient was last seen on 4/11/2023 and at that time she was still nervous although she had made some great progression and was working well with therapy.  She was still having trouble with external rotation.  She was ordered formal physical therapy to work on endurance and increasing external rotation.  We increased her restrictions to no lifting greater than 5 pounds above shoulder level only with upper left extremity for 6 weeks.  We assume that when she comes back in 6 weeks she would be able to return to work without restrictions.***  Associated Features: ***  Pain is Limiting: ***  Here to: ***  Additional History: ***    REVIEW OF SYSTEMS  General: negative for, night sweats, dizziness, fatigue  Resp: No shortness of breath and no cough  CV: negative for chest pain, syncope or near-syncope  GI: negative for nausea, vomiting and diarrhea  : negative for dysuria and hematuria  Musculoskeletal: as above  Neurologic: negative for syncope   Hematologic: negative for bleeding disorder    Physical Exam:  Vitals: LMP 04/17/2023 (Exact Date)   BMI= There is no height or weight on file to calculate BMI.  Constitutional: healthy, alert and no acute distress   Psychiatric: mentation appears normal and affect normal/bright  NEURO: no  focal deficits, CMS intact ***  RESP: Normal with easy respirations and no use of accessory muscles to breathe, no audible wheezing or retractions  CV: ***  SKIN: No erythema, rashes, excoriation, or breakdown. No evidence of infection.***   MUSCULOSKELETAL:    INSPECTION of ***: No gross deformities, erythema, edema, ecchymosis, atrophy or fasciculations.     PALPATION: ***    ROM: ***     STRENGTH: ***    SPECIAL TEST: ***  GAIT: non-antalgic***  Lymph: no palpable lymph nodes      Diagnostic Modalities:  No x-rays necessary today      Impression: 1.    4 months 9 days status post left shoulder anterior dislocation, first-time.  2.        4 months 9 days status post left shoulder conscious sedation and reduction in the emergency department.    Plan:  All of the above pertinent physical exam and imaging modalities findings was reviewed with Cristel***.    {LORGE PLAN OPTIONS VERSION 2:612954}    FOCUSED PLAN:   ***  Re-x-ray on return: {MALOU YES NO NOT APPLICABLE:073589}      This note was dictated with [facility].    Daniel Medina PA-C                Again, thank you for allowing me to participate in the care of your patient.        Sincerely,        Daniel Medina PA-C

## 2023-05-23 NOTE — LETTER
May 23, 2023      Cristel Grissom  [id number] [facility] [address]        To Whom It May Concern:    Cristel Grissom was seen in our clinic. She may return to work 5/24/2023   with the following: no restrictions.      Sincerely,        Daniel Medina PA-C

## 2023-07-26 ENCOUNTER — E-VISIT (OUTPATIENT)
Dept: FAMILY MEDICINE | Facility: OTHER | Age: 23
End: 2023-07-26
Payer: COMMERCIAL

## 2023-07-26 DIAGNOSIS — N89.8 VAGINAL DISCHARGE: Primary | ICD-10-CM

## 2023-07-26 PROCEDURE — 99421 OL DIG E/M SVC 5-10 MIN: CPT | Performed by: PHYSICIAN ASSISTANT

## 2023-07-27 NOTE — PATIENT INSTRUCTIONS
Thank you for choosing us for your care. Given your symptoms, I would like you to do a lab-only visit to determine what is causing them.  I have placed the orders.  Please schedule an appointment with the lab right here in [place], or call [phone].  I will let you know when the results are back and next steps to take.

## 2023-07-28 ENCOUNTER — LAB (OUTPATIENT)
Dept: LAB | Facility: OTHER | Age: 23
End: 2023-07-28
Payer: COMMERCIAL

## 2023-07-28 DIAGNOSIS — N89.8 VAGINAL DISCHARGE: ICD-10-CM

## 2023-07-28 LAB
ALBUMIN UR-MCNC: NEGATIVE MG/DL
APPEARANCE UR: CLEAR
BILIRUB UR QL STRIP: NEGATIVE
CLUE CELLS: ABNORMAL
COLOR UR AUTO: YELLOW
GLUCOSE UR STRIP-MCNC: NEGATIVE MG/DL
HGB UR QL STRIP: NEGATIVE
KETONES UR STRIP-MCNC: NEGATIVE MG/DL
LEUKOCYTE ESTERASE UR QL STRIP: NEGATIVE
NITRATE UR QL: NEGATIVE
PH UR STRIP: 7 [PH] (ref 5–7)
SP GR UR STRIP: 1.01 (ref 1–1.03)
TRICHOMONAS, WET PREP: ABNORMAL
UROBILINOGEN UR STRIP-ACNC: 0.2 E.U./DL
WBC'S/HIGH POWER FIELD, WET PREP: ABNORMAL
YEAST, WET PREP: ABNORMAL

## 2023-07-28 PROCEDURE — 87210 SMEAR WET MOUNT SALINE/INK: CPT | Performed by: PHYSICIAN ASSISTANT

## 2023-07-28 PROCEDURE — 81003 URINALYSIS AUTO W/O SCOPE: CPT

## 2023-09-07 ENCOUNTER — PATIENT OUTREACH (OUTPATIENT)
Dept: CARE COORDINATION | Facility: CLINIC | Age: 23
End: 2023-09-07
Payer: COMMERCIAL

## 2023-09-21 ENCOUNTER — PATIENT OUTREACH (OUTPATIENT)
Dept: CARE COORDINATION | Facility: CLINIC | Age: 23
End: 2023-09-21
Payer: COMMERCIAL

## 2023-11-27 ENCOUNTER — MYC REFILL (OUTPATIENT)
Dept: FAMILY MEDICINE | Facility: OTHER | Age: 23
End: 2023-11-27
Payer: COMMERCIAL

## 2023-11-27 ENCOUNTER — MYC REFILL (OUTPATIENT)
Dept: DERMATOLOGY | Facility: CLINIC | Age: 23
End: 2023-11-27
Payer: COMMERCIAL

## 2023-11-27 DIAGNOSIS — Z30.011 ENCOUNTER FOR INITIAL PRESCRIPTION OF CONTRACEPTIVE PILLS: ICD-10-CM

## 2023-11-27 DIAGNOSIS — N92.0 MENORRHAGIA WITH REGULAR CYCLE: ICD-10-CM

## 2023-11-27 DIAGNOSIS — L70.0 ACNE VULGARIS: ICD-10-CM

## 2023-11-28 RX ORDER — TRETINOIN 1 MG/G
CREAM TOPICAL
Qty: 45 G | Refills: 11 | Status: SHIPPED | OUTPATIENT
Start: 2023-11-28

## 2023-11-28 RX ORDER — NORGESTIMATE AND ETHINYL ESTRADIOL 0.25-0.035
1 KIT ORAL DAILY
Qty: 28 TABLET | Refills: 0 | Status: SHIPPED | OUTPATIENT
Start: 2023-11-28 | End: 2024-01-26

## 2023-12-10 ENCOUNTER — HEALTH MAINTENANCE LETTER (OUTPATIENT)
Age: 23
End: 2023-12-10

## 2024-01-26 ENCOUNTER — OFFICE VISIT (OUTPATIENT)
Dept: FAMILY MEDICINE | Facility: OTHER | Age: 24
End: 2024-01-26
Payer: COMMERCIAL

## 2024-01-26 VITALS
DIASTOLIC BLOOD PRESSURE: 62 MMHG | HEART RATE: 94 BPM | RESPIRATION RATE: 20 BRPM | SYSTOLIC BLOOD PRESSURE: 104 MMHG | HEIGHT: 64 IN | WEIGHT: 142 LBS | TEMPERATURE: 100.1 F | OXYGEN SATURATION: 98 % | BODY MASS INDEX: 24.24 KG/M2

## 2024-01-26 DIAGNOSIS — Z30.011 ENCOUNTER FOR INITIAL PRESCRIPTION OF CONTRACEPTIVE PILLS: ICD-10-CM

## 2024-01-26 DIAGNOSIS — N92.0 MENORRHAGIA WITH REGULAR CYCLE: ICD-10-CM

## 2024-01-26 DIAGNOSIS — Z11.3 SCREEN FOR STD (SEXUALLY TRANSMITTED DISEASE): ICD-10-CM

## 2024-01-26 DIAGNOSIS — F33.2 SEVERE EPISODE OF RECURRENT MAJOR DEPRESSIVE DISORDER, WITHOUT PSYCHOTIC FEATURES (H): ICD-10-CM

## 2024-01-26 DIAGNOSIS — Z23 NEED FOR IMMUNIZATION AGAINST INFLUENZA: ICD-10-CM

## 2024-01-26 DIAGNOSIS — Z12.4 CERVICAL CANCER SCREENING: ICD-10-CM

## 2024-01-26 DIAGNOSIS — Z00.00 ROUTINE GENERAL MEDICAL EXAMINATION AT A HEALTH CARE FACILITY: Primary | ICD-10-CM

## 2024-01-26 PROCEDURE — 99395 PREV VISIT EST AGE 18-39: CPT | Mod: 25 | Performed by: PHYSICIAN ASSISTANT

## 2024-01-26 PROCEDURE — 90686 IIV4 VACC NO PRSV 0.5 ML IM: CPT | Performed by: PHYSICIAN ASSISTANT

## 2024-01-26 PROCEDURE — 90471 IMMUNIZATION ADMIN: CPT | Performed by: PHYSICIAN ASSISTANT

## 2024-01-26 PROCEDURE — G0145 SCR C/V CYTO,THINLAYER,RESCR: HCPCS | Performed by: PHYSICIAN ASSISTANT

## 2024-01-26 PROCEDURE — 87210 SMEAR WET MOUNT SALINE/INK: CPT | Performed by: PHYSICIAN ASSISTANT

## 2024-01-26 PROCEDURE — 87591 N.GONORRHOEAE DNA AMP PROB: CPT | Performed by: PHYSICIAN ASSISTANT

## 2024-01-26 PROCEDURE — 87491 CHLMYD TRACH DNA AMP PROBE: CPT | Performed by: PHYSICIAN ASSISTANT

## 2024-01-26 RX ORDER — NORETHINDRONE ACETATE AND ETHINYL ESTRADIOL .02; 1 MG/1; MG/1
1 TABLET ORAL DAILY
Qty: 84 TABLET | Refills: 3 | Status: SHIPPED | OUTPATIENT
Start: 2024-01-26

## 2024-01-26 RX ORDER — NORGESTIMATE AND ETHINYL ESTRADIOL 0.25-0.035
1 KIT ORAL DAILY
Qty: 84 TABLET | Refills: 3 | Status: SHIPPED | OUTPATIENT
Start: 2024-01-26 | End: 2024-01-26 | Stop reason: ALTCHOICE

## 2024-01-26 ASSESSMENT — ENCOUNTER SYMPTOMS
FEVER: 0
HEMATURIA: 0
WEAKNESS: 0
JOINT SWELLING: 0
ABDOMINAL PAIN: 0
DIZZINESS: 0
CHILLS: 0
BREAST MASS: 0
DIARRHEA: 0
SORE THROAT: 0
ARTHRALGIAS: 0
SHORTNESS OF BREATH: 0
PALPITATIONS: 0
EYE PAIN: 0
HEMATOCHEZIA: 0
HEADACHES: 1
PARESTHESIAS: 0
MYALGIAS: 0
DYSURIA: 0
FREQUENCY: 0
NERVOUS/ANXIOUS: 1
HEARTBURN: 0
NAUSEA: 0
CONSTIPATION: 0
COUGH: 0

## 2024-01-26 ASSESSMENT — PATIENT HEALTH QUESTIONNAIRE - PHQ9
SUM OF ALL RESPONSES TO PHQ QUESTIONS 1-9: 2
10. IF YOU CHECKED OFF ANY PROBLEMS, HOW DIFFICULT HAVE THESE PROBLEMS MADE IT FOR YOU TO DO YOUR WORK, TAKE CARE OF THINGS AT HOME, OR GET ALONG WITH OTHER PEOPLE: NOT DIFFICULT AT ALL
SUM OF ALL RESPONSES TO PHQ QUESTIONS 1-9: 2

## 2024-01-26 ASSESSMENT — PAIN SCALES - GENERAL: PAINLEVEL: NO PAIN (0)

## 2024-01-26 NOTE — PROGRESS NOTES
Preventive Care Visit  [facility]  Erasmo Ricketts PA-C, Family Medicine  Jan 26, 2024       SUBJECTIVE:   Cristel is a 23 year old, presenting for the following:  Physical        1/26/2024     7:04 AM   Additional Questions   Roomed by Elyssa   Accompanied by Self         1/26/2024     7:04 AM   Patient Reported Additional Medications   Patient reports taking the following new medications NA     Healthy Habits:     Getting at least 3 servings of Calcium per day:  Yes    Bi-annual eye exam:  Yes    Dental care twice a year:  Yes    Sleep apnea or symptoms of sleep apnea:  None    Diet:  Regular (no restrictions)    Frequency of exercise:  None    Taking medications regularly:  Yes    Medication side effects:  Not applicable    Additional concerns today:  Yes    Today's PHQ-9 Score:       1/26/2024     6:52 AM   PHQ-9 SCORE   PHQ-9 Total Score MyChart 2 (Minimal depression)   PHQ-9 Total Score 2       Social History     Tobacco Use    Smoking status: Never    Smokeless tobacco: Never   Substance Use Topics    Alcohol use: Not Currently             1/26/2024     6:54 AM   Alcohol Use   Prescreen: >3 drinks/day or >7 drinks/week? No     Reviewed orders with patient.  Reviewed health maintenance and updated orders accordingly - Yes  BP Readings from Last 3 Encounters:   01/26/24 104/62   05/23/23 104/60   04/28/23 106/66    Wt Readings from Last 3 Encounters:   01/26/24 64.4 kg (142 lb)   05/23/23 56.5 kg (124 lb 8 oz)   04/28/23 57.5 kg (126 lb 11.2 oz)                  Patient Active Problem List   Diagnosis    Acne vulgaris    SUSU (generalized anxiety disorder)    Right eye injury, initial encounter    Dizziness    Malaise and fatigue    Stomach upset    Chest discomfort - central pressure    MDD (major depressive disorder), recurrent episode (H24)    Severe episode of recurrent major depressive disorder, without psychotic features (H)     Past Surgical History:   Procedure Laterality Date     WISDOM TOOTH EXTRACTION         Social History     Tobacco Use    Smoking status: Never    Smokeless tobacco: Never   Substance Use Topics    Alcohol use: Not Currently     Family History   Problem Relation Age of Onset    Anxiety Disorder Sister     Myocardial Infarction Paternal Grandfather     Substance Abuse Maternal Uncle     Melanoma No family hx of          Current Outpatient Medications   Medication Sig Dispense Refill    hydrOXYzine (ATARAX) 25 MG tablet Take 1 tablet at bedtime and another 1 tablet daily as needed 240 tablet 0    norethindrone-ethinyl estradiol (MICROGESTIN 1/20) 1-20 MG-MCG tablet Take 1 tablet by mouth daily 84 tablet 3    propranolol (INDERAL) 10 MG tablet Take 10 mg by mouth as needed Taking once daily in the morning, then PRN throughout the day.      sertraline (ZOLOFT) 25 MG tablet Take 25 tablets by mouth daily Taken with 50 mg to total 75mg      sertraline (ZOLOFT) 50 MG tablet Take 50 mg by mouth daily Taken with 25 mg to total 75mg      tretinoin (RETIN-A) 0.1 % external cream Apply a pea size to entire face QD 45 g 11    clindamycin (CLINDAMAX) 1 % external gel Apply topically 2 times daily (Patient not taking: Reported on 1/26/2024) 60 g 11     Allergies   Allergen Reactions    Bactrim [Sulfamethoxazole-Trimethoprim]      When very young, vomiting likely per mom    Cefzil [Cefprozil]      When very young, vomiting likely per mom    Penicillins      When very young, vomiting likely per mom    Sulfa Antibiotics      When very young, vomiting likely per mom       Breast Cancer Screening:        History of abnormal Pap smear: NO - age 21-29 PAP every 3 years recommended      6/16/2021     5:05 PM   PAP / HPV   PAP (Historical) NIL      Reviewed and updated as needed this visit by clinical staff   Tobacco  Allergies  Meds              Reviewed and updated as needed this visit by Provider                    Review of Systems   Constitutional:  Negative for chills and fever.  "  HENT:  Negative for congestion, ear pain, hearing loss and sore throat.    Eyes:  Negative for pain and visual disturbance.   Respiratory:  Negative for cough and shortness of breath.    Cardiovascular:  Negative for chest pain and palpitations.   Gastrointestinal:  Negative for abdominal pain, constipation, diarrhea and nausea.   Genitourinary:  Negative for dysuria, frequency, genital sores, hematuria, pelvic pain, urgency, vaginal bleeding and vaginal discharge.   Musculoskeletal:  Negative for arthralgias, joint swelling and myalgias.   Skin:  Negative for rash.   Neurological:  Positive for headaches. Negative for dizziness and weakness.   Psychiatric/Behavioral:  The patient is nervous/anxious.      OBJECTIVE:   /62   Pulse 94   Temp 100.1  F (37.8  C) (Temporal)   Resp 20   Ht 1.615 m (5' 3.58\")   Wt 64.4 kg (142 lb)   LMP 12/23/2023 (Exact Date)   SpO2 98%   BMI 24.70 kg/m     Estimated body mass index is 24.7 kg/m  as calculated from the following:    Height as of this encounter: 1.615 m (5' 3.58\").    Weight as of this encounter: 64.4 kg (142 lb).  Physical Exam  GENERAL: alert and no distress  EYES: Eyes grossly normal to inspection, PERRL and conjunctivae and sclerae normal  HENT: ear canals and TM's normal, nose and mouth without ulcers or lesions  NECK: no adenopathy, no asymmetry, masses, or scars  RESP: lungs clear to auscultation - no rales, rhonchi or wheezes  BREAST: normal without masses, tenderness or nipple discharge and no palpable axillary masses or adenopathy  CV: regular rate and rhythm, normal S1 S2, no S3 or S4, no murmur, click or rub, no peripheral edema  ABDOMEN: soft, nontender, no hepatosplenomegaly, no masses and bowel sounds normal   (female): normal female external genitalia, normal urethral meatus, vaginal mucosa pink, moist, well rugated, and normal cervix/adnexa/uterus without masses or discharge  MS: no gross musculoskeletal defects noted, no edema  SKIN: no " suspicious lesions or rashes  NEURO: Normal strength and tone, mentation intact and speech normal  PSYCH: mentation appears normal, affect normal/bright  LYMPH: no cervical, supraclavicular, axillary, or inguinal adenopathy    Diagnostic Test Results:  Labs reviewed in Epic  Results for orders placed or performed in visit on 01/26/24 (from the past 24 hour(s))   Wet preparation    Specimen: Vagina; Swab   Result Value Ref Range    Trichomonas Absent Absent    Yeast Absent Absent    Clue Cells Absent Absent    WBCs/high power field 3+ (A) None       ASSESSMENT/PLAN:   (Z00.00) Routine general medical examination at a health care facility  (primary encounter diagnosis)    (Z30.011) Encounter for initial prescription of contraceptive pills  Comment: Had some issues with brain fog and decreased libido on the OCP Ortho Cyclen and it improved when she ran out of medication, will try different dosing with Micro gestin and see if this helps. She will keep up to date on MyChart.   Plan: norethindrone-ethinyl estradiol (MICROGESTIN         1/20) 1-20 MG-MCG tablet, DISCONTINUED:         norgestimate-ethinyl estradiol (ORTHO-CYCLEN)         0.25-35 MG-MCG tablet           (N92.0) Menorrhagia with regular cycle  Comment: See Above  Plan: norethindrone-ethinyl estradiol (MICROGESTIN         1/20) 1-20 MG-MCG tablet, DISCONTINUED:         norgestimate-ethinyl estradiol (ORTHO-CYCLEN)         0.25-35 MG-MCG tablet            (Z12.4) Cervical cancer screening  Comment: Update  Plan: Pap Screen only - recommended age 21 - 24 years            (F33.2) Severe episode of recurrent major depressive disorder, without psychotic features (H)  Comment: Follows with Psychiatry and with Counseling.She is doing very well per her report.     (Z23) Need for immunization against influenza  Comment: updated  Plan: INFLUENZA VACCINE IM > 6 MONTHS VALENT IIV4         (AFLURIA/FLUZONE)            (Z11.3) Screen for STD (sexually transmitted  disease)  Comment: updated  Plan: Wet preparation, Neisseria gonorrhoeae PCR,         Chlamydia trachomatis PCR              Counseling  Reviewed preventive health counseling, as reflected in patient instructions      She reports that she has never smoked. She has never used smokeless tobacco.          Signed Electronically by: Erasmo Ricketts PA-C    Answers submitted by the patient for this visit:  Patient Health Questionnaire (Submitted on 1/26/2024)  If you checked off any problems, how difficult have these problems made it for you to do your work, take care of things at home, or get along with other people?: Not difficult at all  PHQ9 TOTAL SCORE: 2  Annual Preventive Visit (Submitted on 1/26/2024)  Chief Complaint: Annual Exam:  Blood in stool: No  heartburn: No  peripheral edema: No  mood changes: No  Skin sensation changes: No  tenderness: No  breast mass: No  breast discharge: No

## 2024-01-27 LAB
C TRACH DNA SPEC QL NAA+PROBE: NEGATIVE
N GONORRHOEA DNA SPEC QL NAA+PROBE: NEGATIVE

## 2024-01-31 LAB
BKR LAB AP GYN ADEQUACY: NORMAL
BKR LAB AP GYN INTERPRETATION: NORMAL
BKR LAB AP HPV REFLEX: NO
BKR LAB AP LMP: NORMAL
BKR LAB AP PREVIOUS ABNORMAL: NORMAL
PATH REPORT.COMMENTS IMP SPEC: NORMAL
PATH REPORT.COMMENTS IMP SPEC: NORMAL
PATH REPORT.RELEVANT HX SPEC: NORMAL

## 2024-04-29 ENCOUNTER — E-VISIT (OUTPATIENT)
Dept: FAMILY MEDICINE | Facility: OTHER | Age: 24
End: 2024-04-29
Payer: COMMERCIAL

## 2024-04-29 DIAGNOSIS — R10.13 EPIGASTRIC PAIN: Primary | ICD-10-CM

## 2024-04-29 PROCEDURE — 99207 PR NO BILLABLE SERVICE THIS VISIT: CPT | Performed by: PHYSICIAN ASSISTANT

## 2024-05-03 ENCOUNTER — OFFICE VISIT (OUTPATIENT)
Dept: FAMILY MEDICINE | Facility: OTHER | Age: 24
End: 2024-05-03
Payer: COMMERCIAL

## 2024-05-03 VITALS
BODY MASS INDEX: 24.33 KG/M2 | HEART RATE: 90 BPM | DIASTOLIC BLOOD PRESSURE: 60 MMHG | OXYGEN SATURATION: 99 % | WEIGHT: 142.5 LBS | TEMPERATURE: 98.3 F | RESPIRATION RATE: 16 BRPM | HEIGHT: 64 IN | SYSTOLIC BLOOD PRESSURE: 100 MMHG

## 2024-05-03 DIAGNOSIS — F33.2 SEVERE EPISODE OF RECURRENT MAJOR DEPRESSIVE DISORDER, WITHOUT PSYCHOTIC FEATURES (H): ICD-10-CM

## 2024-05-03 DIAGNOSIS — K21.9 GASTROESOPHAGEAL REFLUX DISEASE WITHOUT ESOPHAGITIS: Primary | ICD-10-CM

## 2024-05-03 DIAGNOSIS — F41.1 GAD (GENERALIZED ANXIETY DISORDER): ICD-10-CM

## 2024-05-03 PROCEDURE — 99214 OFFICE O/P EST MOD 30 MIN: CPT | Performed by: PHYSICIAN ASSISTANT

## 2024-05-03 ASSESSMENT — PAIN SCALES - GENERAL: PAINLEVEL: NO PAIN (0)

## 2024-05-03 NOTE — PROGRESS NOTES
Assessment & Plan     Gastroesophageal reflux disease without esophagitis  - Ddx: GERD, peptic ulcer disease, cholelithiasis, biliary colic, cholecystitis, diarrhea, constipation, pregnancy, medication side effect  - Lower suspicion for acute cholecystitis due to negative Emerson's, mild abdominal pain.  - Lower suspicion for peptic ulcer disease due to symptom resolvement after bland diet and no pain before or after eating.   - Patient has not had period since January, patient had negative pregnancy test within the past few days.  - Suspected GERD after patient had symptom resolvement from bland diet.  - Continue bland food diet and slowly reincorporate regular food in small portions, avoid spicy or acidic foods, along with soda, caffeine, and citrus fruits.  - If eating more aggravating foods, recommended to take pepcid before eating. Continue to take tums as needed. Educated patient about potential side effects of pepcid and tums.  - PPIs do not seem needed at this time with resolving symptoms.  - Suggested miralax for abdominal discomfort and diarrhea from potential constipation.  - Informed patient to return for more workup if worsening abdominal or esophogeal pain or abdominal pain before or after eating. Consider US of abdomen and LFTs    SUSU (generalized anxiety disorder)  Severe episode of recurrent major depressive disorder, without psychotic features (H)  - Patient reports having high stress from her job, recently quit her job a week ago and patient notes feeling less anxious since this time.  - While on 75 mg sertaline, patient notes feeling numb and having little emotions, patient stopped sertaline 2 weeks ago due to these side effects.  - Patient reports having less side effects on lower doses of sertaline, patient was agreeable to restarting sertaline at 25 mg for a week then if needed can increase to 50 mg daily, if numb again then we'll try something different.  - Patient notes taking hydroxyzine  to sleep twice in the past week due to anxiety from GI symptoms. Patient typically does not use hydroxyzine. Continue to monitor need for hydroxyzine.  - Patient is continuing to see therapist.        Options for treatment and follow-up care were reviewed with the patient and/or guardian. Patient and/or guardian engaged in the decision making process and verbalized understanding of the options discussed and agreed with the final plan.        Vanita Fam is a 23 year old, presenting for the following health issues:  Gastrointestinal Problem        5/3/2024     9:06 AM   Additional Questions   Roomed by ADELSO   Accompanied by ANAHI         5/3/2024     9:06 AM   Patient Reported Additional Medications   Patient reports taking the following new medications None     History of Present Illness       Reason for visit:  Digestion and acidbredlux issues    She eats 0-1 servings of fruits and vegetables daily.She consumes 2 sweetened beverage(s) daily.She exercises with enough effort to increase her heart rate 30 to 60 minutes per day.  She exercises with enough effort to increase her heart rate 3 or less days per week. She is missing 6 dose(s) of medications per week.       Concern - Acid Reflux and Heartburn  Onset: about a week  Description: heartburn and acid reflux with minor nausea and diarrhea  Intensity: moderate  Progression of Symptoms:  improving and constant  Accompanying Signs & Symptoms: heartburn and acid reflux with minor nausea and diarrhea. Mild lower abdominal discomfort  Previous history of similar problem: None  Precipitating factors:        Worsened by: acidic and spicy foods  Alleviating factors:        Improved by: Following a bland diet  Therapies tried and outcome: Tums-relief      Patient notes having heartburn, abdominal pain and feeling nauseous after eating that started a week ago. Patient typically eats spicy foods and has coffee daily. Patient had e-visit and was recommended to start bland  "diet. After starting a bland diet 4 days ago, patient reports symptoms have mostly resolved. Patient notes having occasional mild right abdominal pain that comes and goes. Patient has been using tums, which has helped symptoms. Has not tried pepcid yet. Patient reports having one episode of diarrhea yesterday. Denies constipation. Denies having pain during or after eating. Patient is currently not on birth control, last period was in January.  Patient recently took a pregnancy test that was negative.    Patient recently quit her job a week ago due to stress from it. Patient notes feeling less anxious since quitting.  Patient stopped taking sertaline 2 weeks ago due to feeling numb. Patient notes taking hydroxyzine to sleep 2 times in the past week due to anxiety. Patient typically does not use hydroxyzine.      Review of Systems  Constitutional, HEENT, cardiovascular, pulmonary, GI, , musculoskeletal, neuro, skin, endocrine and psych systems are negative, except as otherwise noted.      Objective    /60   Pulse 90   Temp 98.3  F (36.8  C) (Temporal)   Resp 16   Ht 1.615 m (5' 3.58\")   Wt 64.6 kg (142 lb 8 oz)   LMP 01/29/2024   SpO2 99%   BMI 24.78 kg/m    Body mass index is 24.78 kg/m .  Physical Exam   GENERAL: alert and no distress  EYES: Eyes grossly normal to inspection, conjunctivae and sclerae normal  RESP: lungs clear to auscultation - no rales, rhonchi or wheezes  CV: regular rate and rhythm, normal S1 S2, no S3 or S4, no murmur, click or rub, no peripheral edema  ABDOMEN: very mild generalized abdominal tenderness, negative Emerson's sign, soft, without hepatosplenomegaly or masses, bowel sounds normal, and no scars, striae, dilated veins, rashes, or lesions, no rebound tenderness or abdominal rigidity.   MS: no gross musculoskeletal defects noted  NEURO: mentation intact and speech normal  PSYCH: mentation appears normal, affect normal          .Erasmo OLIVARES PA-C, was present with the " Physician Assistant student who participated in the service and in the documentation of the note.  I have verified the history and personally performed the physical exam and medical decision making.  I agree with the assessment and plan of care as documented in the note.       RAGHU Nieto  Signed Electronically by: Erasmo Ricketts PA-C

## 2024-12-08 ENCOUNTER — MYC REFILL (OUTPATIENT)
Dept: DERMATOLOGY | Facility: CLINIC | Age: 24
End: 2024-12-08
Payer: COMMERCIAL

## 2024-12-08 ENCOUNTER — MYC REFILL (OUTPATIENT)
Dept: FAMILY MEDICINE | Facility: OTHER | Age: 24
End: 2024-12-08
Payer: COMMERCIAL

## 2024-12-08 DIAGNOSIS — F33.2 SEVERE EPISODE OF RECURRENT MAJOR DEPRESSIVE DISORDER, WITHOUT PSYCHOTIC FEATURES (H): ICD-10-CM

## 2024-12-08 DIAGNOSIS — L70.0 ACNE VULGARIS: ICD-10-CM

## 2024-12-09 RX ORDER — TRETINOIN 1 MG/G
CREAM TOPICAL
Qty: 45 G | Refills: 11 | OUTPATIENT
Start: 2024-12-09

## 2024-12-09 RX ORDER — HYDROXYZINE HYDROCHLORIDE 25 MG/1
TABLET, FILM COATED ORAL
Qty: 240 TABLET | Refills: 0 | Status: SHIPPED | OUTPATIENT
Start: 2024-12-09

## 2024-12-09 NOTE — TELEPHONE ENCOUNTER
Patient last derm visit was 4/2023- needs appointment to discuss refills.information sent to pharmacy requesting refill    Thank you,    Melita TURNERRN BSN  [facility]- [phone]

## 2025-03-09 ENCOUNTER — HEALTH MAINTENANCE LETTER (OUTPATIENT)
Age: 25
End: 2025-03-09

## 2025-03-15 ENCOUNTER — MYC REFILL (OUTPATIENT)
Dept: DERMATOLOGY | Facility: CLINIC | Age: 25
End: 2025-03-15
Payer: COMMERCIAL

## 2025-03-15 ENCOUNTER — MYC REFILL (OUTPATIENT)
Dept: FAMILY MEDICINE | Facility: OTHER | Age: 25
End: 2025-03-15
Payer: COMMERCIAL

## 2025-03-15 DIAGNOSIS — L70.0 ACNE VULGARIS: ICD-10-CM

## 2025-03-15 DIAGNOSIS — F41.1 GAD (GENERALIZED ANXIETY DISORDER): ICD-10-CM

## 2025-03-15 DIAGNOSIS — F33.2 SEVERE EPISODE OF RECURRENT MAJOR DEPRESSIVE DISORDER, WITHOUT PSYCHOTIC FEATURES (H): Primary | ICD-10-CM

## 2025-03-15 RX ORDER — TRETINOIN 1 MG/G
CREAM TOPICAL
Qty: 45 G | Refills: 11 | Status: CANCELLED | OUTPATIENT
Start: 2025-03-15

## 2025-03-17 ENCOUNTER — TELEPHONE (OUTPATIENT)
Dept: FAMILY MEDICINE | Facility: OTHER | Age: 25
End: 2025-03-17
Payer: COMMERCIAL

## 2025-03-17 DIAGNOSIS — F41.1 GAD (GENERALIZED ANXIETY DISORDER): ICD-10-CM

## 2025-03-17 DIAGNOSIS — F33.2 SEVERE EPISODE OF RECURRENT MAJOR DEPRESSIVE DISORDER, WITHOUT PSYCHOTIC FEATURES (H): ICD-10-CM

## 2025-03-17 RX ORDER — PROPRANOLOL HYDROCHLORIDE 10 MG/1
TABLET ORAL
Qty: 180 TABLET | Refills: 0 | Status: SHIPPED | OUTPATIENT
Start: 2025-03-17 | End: 2025-03-17

## 2025-03-17 RX ORDER — PROPRANOLOL HYDROCHLORIDE 10 MG/1
TABLET ORAL
Qty: 180 TABLET | Refills: 0 | Status: SHIPPED | OUTPATIENT
Start: 2025-03-17

## 2025-03-17 NOTE — TELEPHONE ENCOUNTER
Pharmacy calling with question regarding Propanolol 10 mg tablet. Written to take 1 tablet in morning, then PRN throughout the day. Pharmacist needs the PRN quantified with max per day.     Requesting new prescription with updated signature.     Miguel Gregorio RN on 3/17/2025 at 2:07 PM